# Patient Record
Sex: FEMALE | Race: WHITE | NOT HISPANIC OR LATINO | Employment: FULL TIME | URBAN - METROPOLITAN AREA
[De-identification: names, ages, dates, MRNs, and addresses within clinical notes are randomized per-mention and may not be internally consistent; named-entity substitution may affect disease eponyms.]

---

## 2017-06-13 ENCOUNTER — GENERIC CONVERSION - ENCOUNTER (OUTPATIENT)
Dept: CARDIOLOGY CLINIC | Facility: CLINIC | Age: 52
End: 2017-06-13

## 2018-03-05 RX ORDER — LEVOTHYROXINE SODIUM 50 MCG
50 TABLET ORAL
Refills: 2 | COMMUNITY
Start: 2018-02-06 | End: 2020-09-04

## 2018-03-05 RX ORDER — BUPROPION HYDROCHLORIDE 100 MG/1
TABLET, EXTENDED RELEASE ORAL
Refills: 0 | COMMUNITY
Start: 2018-02-06

## 2018-03-05 RX ORDER — LEVOTHYROXINE SODIUM 100 MCG
TABLET ORAL
Refills: 2 | COMMUNITY
Start: 2018-01-05 | End: 2018-03-12 | Stop reason: ALTCHOICE

## 2018-03-08 RX ORDER — ALPRAZOLAM 0.25 MG/1
TABLET ORAL AS NEEDED
COMMUNITY
End: 2020-09-04 | Stop reason: ALTCHOICE

## 2018-03-08 RX ORDER — MULTIVIT-MIN/IRON/FOLIC ACID/K 18-600-40
1 CAPSULE ORAL
COMMUNITY
End: 2020-11-17

## 2018-03-08 RX ORDER — MULTIVITAMIN/IRON/FOLIC ACID 18MG-0.4MG
1 TABLET ORAL DAILY
COMMUNITY
End: 2018-03-12 | Stop reason: ALTCHOICE

## 2018-03-12 ENCOUNTER — OFFICE VISIT (OUTPATIENT)
Dept: CARDIOLOGY CLINIC | Facility: CLINIC | Age: 53
End: 2018-03-12
Payer: COMMERCIAL

## 2018-03-12 VITALS
BODY MASS INDEX: 20.86 KG/M2 | WEIGHT: 125.2 LBS | HEART RATE: 56 BPM | DIASTOLIC BLOOD PRESSURE: 80 MMHG | HEIGHT: 65 IN | SYSTOLIC BLOOD PRESSURE: 110 MMHG

## 2018-03-12 DIAGNOSIS — R00.2 PALPITATIONS: Primary | ICD-10-CM

## 2018-03-12 DIAGNOSIS — E03.9 HYPOTHYROIDISM, UNSPECIFIED TYPE: ICD-10-CM

## 2018-03-12 PROCEDURE — 99243 OFF/OP CNSLTJ NEW/EST LOW 30: CPT | Performed by: INTERNAL MEDICINE

## 2018-03-12 PROCEDURE — 93000 ELECTROCARDIOGRAM COMPLETE: CPT | Performed by: INTERNAL MEDICINE

## 2018-03-12 NOTE — PROGRESS NOTES
Cardiology Consultation     Betty Jensen  [id number]  1965  [facility] [address] [facility] [address]    1  Palpitations  POCT ECG   2  Hypothyroidism, unspecified type  Ambulatory referral to Endocrinology       History of present illness    The patient is a pleasant 61-year-old lady who is here for palpitations  Episodes started in August September of 2017  She was having episodes almost every day at that time  She would feel her heart fluttering  This can last anywhere from a few minutes to a event few hours    It was not associated with angina like chest pain or chest pressure  It was not associated with orthopnea, paroxysmal nocturnal dyspnea  There was no leg swelling  There was no presyncope or syncope    Usually the episodes would settle over time  They have decreased in frequency over the subsequent months  She has not felt any palpitation in the last 2 months      She is otherwise extremely active  She exercises 6/7 days, on treadmill, Pilates as well as weight training  She is also extremely conscious about her diet  She does not take any stimulants  There is no excessive coffee, caffeine or dark chocolate    Does not have any significant history of coronary artery disease  There is no family history of sudden cardiac death        Patient Active Problem List   Diagnosis    Palpitations     Past Medical History:   Diagnosis Date    Heart palpitations      Social History     Social History    Marital status: /Civil Union     Spouse name: N/A    Number of children: N/A    Years of education: N/A     Occupational History    Not on file       Social History Main Topics    Smoking status: Never Smoker    Smokeless tobacco: Never Used    Alcohol use 0 6 - 1 2 oz/week     1 - 2 Glasses of wine per week      Comment: daily    Drug use: Unknown    Sexual activity: Not on file Other Topics Concern    Not on file     Social History Narrative    No narrative on file      No family history on file  No past surgical history on file  Current Outpatient Prescriptions:     ALPRAZolam (XANAX) 0 25 mg tablet, Take by mouth daily at bedtime as needed for anxiety, Disp: , Rfl:     Ascorbic Acid (VITAMIN C) 500 MG CAPS, Take by mouth, Disp: , Rfl:     B Complex Vitamins (VITAMIN B COMPLEX PO), Take by mouth, Disp: , Rfl:     buPROPion (WELLBUTRIN SR) 100 mg 12 hr tablet, TAKE 1 TABLET BY [facility] TWICE DAILY, Disp: , Rfl: 0    Calcium Carbonate (CALTRATE 600 PO), Take by mouth, Disp: , Rfl:     Coenzyme Q10 (CO Q 10) 100 MG CAPS, Take 1 capsule by mouth daily, Disp: , Rfl:     SYNTHROID 50 MCG tablet, Take 50 mcg by mouth daily, Disp: , Rfl: 2  No Known Allergies  Vitals:    03/12/18 1632   BP: 110/80   BP Location: Left arm   Patient Position: Sitting   Cuff Size: Standard   Pulse: 56   Weight: 56 8 kg (125 lb 3 2 oz)   Height: 5' 5" (1 651 m)       Labs:No results found for: NA, K, CL, CO2, BUN, CREATININE, GLUCOSE, CALCIUM  No results found for: CKTOTAL, CKMB, CKMBINDEX, TROPONINI  No results found for: WBC, HGB, HCT, MCV, PLT  No results found for: CHOL, TRIG, HDL, LDLDIRECT  Imaging: No results found  Review of Systems:  Review of Systems   Reason unable to perform ROS: As described in my history of present illness  All other systems reviewed and are negative  Physical Exam:  Physical Exam   Constitutional: She is oriented to person, place, and time  She appears well-developed and well-nourished  No distress  Not in any distress at the current time   HENT:   Head: Normocephalic and atraumatic  Right Ear: External ear normal    Left Ear: External ear normal    Nose: Nose normal    Mouth/Throat: Uvula is midline and mucous membranes are normal    Eyes: Conjunctivae, EOM and lids are normal  Pupils are equal, round, and reactive to light  No scleral icterus     No pallor  No cyanosis  No icterus   Neck: Trachea normal and normal range of motion  Neck supple  No JVD present  Carotid bruit is not present  No thyromegaly present  No jugular lymphadenopathy   Cardiovascular: Normal rate, regular rhythm, S1 normal, S2 normal, normal heart sounds, intact distal pulses and normal pulses  PMI is not displaced  Exam reveals no gallop, no S3, no S4 and no friction rub  No murmur heard  Pulmonary/Chest: Effort normal and breath sounds normal  No accessory muscle usage  No respiratory distress  She has no decreased breath sounds  She has no wheezes  She has no rhonchi  She has no rales  She exhibits no tenderness  Abdominal: Soft  Normal appearance and bowel sounds are normal  She exhibits no distension and no mass  There is no splenomegaly or hepatomegaly  There is no tenderness  Musculoskeletal: Normal range of motion  She exhibits no edema, tenderness or deformity  Lymphadenopathy:     She has no cervical adenopathy  Neurological: She is alert and oriented to person, place, and time  Facial symmetry is retained  Extraocular movements are retained  Head neck tongue and palate movement are retained and symmetric   Skin: Skin is intact  No abrasion, no lesion and no rash noted  No erythema  Nails show no clubbing  Psychiatric: She has a normal mood and affect  Her speech is normal and behavior is normal  Thought content normal        Discussion/Summary:      1    Palpitations    Patient is having them for about 4-5 years now  They are episodic - Last episode happened in fall of 2017, after they came back from a vacation in [facility]  There has been no further episodes of significance in the last few months      Patient does have a history of thyroid disease  She is on replacement Synthroid  Her heart rate is still bradycardic      There is no evidence of excessive thyroid replacement at the current time  Patient's episodes are far apart where noninvasive monitoring will be useless  With  minimal symptoms would not consider invasive monitoring      At the current time my recommendation for this patient:  -if the patient has symptoms, to come in for an EKG        2    Thyroid disease  Patient is still bradycardic  She is on replacement Synthroid  Follow-up with her primary care and endocrinologist

## 2018-03-12 NOTE — LETTER
March 12, 2018     Fina Martinez MD  [facility] [address]    Patient: Anil García   YOB: 1965   Date of Visit: 3/12/2018       Dear Dr Doreen Ulloa: Thank you for referring Anil García to me for evaluation  Below are my notes for this consultation  If you have questions, please do not hesitate to call me  I look forward to following your patient along with you  Sincerely,        Mj Hebert MD        CC: MD Mj Warner MD  3/12/2018  5:16 PM  Sign at close encounter                                             Cardiology Consultation     Anil García  [id number]  1965  [facility] [address]    1  Palpitations  POCT ECG   2   Hypothyroidism, unspecified type  Ambulatory referral to Endocrinology       History of present illness    The patient is a pleasant 60-year-old lady who is here for palpitations  Episodes started in August September of 2017  She was having episodes almost every day at that time  She would feel her heart fluttering  This can last anywhere from a few minutes to a event few hours    It was not associated with angina like chest pain or chest pressure  It was not associated with orthopnea, paroxysmal nocturnal dyspnea  There was no leg swelling  There was no presyncope or syncope    Usually the episodes would settle over time  They have decreased in frequency over the subsequent months  She has not felt any palpitation in the last 2 months      She is otherwise extremely active  She exercises 6/7 days, on treadmill, Pilates as well as weight training  She is also extremely conscious about her diet  She does not take any stimulants  There is no excessive coffee, caffeine or dark chocolate    Does not have any significant history of coronary artery disease  There is no family history of sudden cardiac death        Patient Active Problem List   Diagnosis    Palpitations     Past Medical History:   Diagnosis Date    Heart palpitations      Social History     Social History    Marital status: /Civil Union     Spouse name: N/A    Number of children: N/A    Years of education: N/A     Occupational History    Not on file  Social History Main Topics    Smoking status: Never Smoker    Smokeless tobacco: Never Used    Alcohol use 0 6 - 1 2 oz/week     1 - 2 Glasses of wine per week      Comment: daily    Drug use: Unknown    Sexual activity: Not on file     Other Topics Concern    Not on file     Social History Narrative    No narrative on file      No family history on file  No past surgical history on file  Current Outpatient Prescriptions:     ALPRAZolam (XANAX) 0 25 mg tablet, Take by mouth daily at bedtime as needed for anxiety, Disp: , Rfl:     Ascorbic Acid (VITAMIN C) 500 MG CAPS, Take by mouth, Disp: , Rfl:     B Complex Vitamins (VITAMIN B COMPLEX PO), Take by mouth, Disp: , Rfl:     buPROPion (WELLBUTRIN SR) 100 mg 12 hr tablet, TAKE 1 TABLET BY [facility] TWICE DAILY, Disp: , Rfl: 0    Calcium Carbonate (CALTRATE 600 PO), Take by mouth, Disp: , Rfl:     Coenzyme Q10 (CO Q 10) 100 MG CAPS, Take 1 capsule by mouth daily, Disp: , Rfl:     SYNTHROID 50 MCG tablet, Take 50 mcg by mouth daily, Disp: , Rfl: 2  No Known Allergies  Vitals:    03/12/18 1632   BP: 110/80   BP Location: Left arm   Patient Position: Sitting   Cuff Size: Standard   Pulse: 56   Weight: 56 8 kg (125 lb 3 2 oz)   Height: 5' 5" (1 651 m)       Labs:No results found for: NA, K, CL, CO2, BUN, CREATININE, GLUCOSE, CALCIUM  No results found for: CKTOTAL, CKMB, CKMBINDEX, TROPONINI  No results found for: WBC, HGB, HCT, MCV, PLT  No results found for: CHOL, TRIG, HDL, LDLDIRECT  Imaging: No results found      Review of Systems:  Review of Systems   Reason unable to perform ROS: As described in my history of present illness  All other systems reviewed and are negative  Physical Exam:  Physical Exam   Constitutional: She is oriented to person, place, and time  She appears well-developed and well-nourished  No distress  Not in any distress at the current time   HENT:   Head: Normocephalic and atraumatic  Right Ear: External ear normal    Left Ear: External ear normal    Nose: Nose normal    Mouth/Throat: Uvula is midline and mucous membranes are normal    Eyes: Conjunctivae, EOM and lids are normal  Pupils are equal, round, and reactive to light  No scleral icterus  No pallor  No cyanosis  No icterus   Neck: Trachea normal and normal range of motion  Neck supple  No JVD present  Carotid bruit is not present  No thyromegaly present  No jugular lymphadenopathy   Cardiovascular: Normal rate, regular rhythm, S1 normal, S2 normal, normal heart sounds, intact distal pulses and normal pulses  PMI is not displaced  Exam reveals no gallop, no S3, no S4 and no friction rub  No murmur heard  Pulmonary/Chest: Effort normal and breath sounds normal  No accessory muscle usage  No respiratory distress  She has no decreased breath sounds  She has no wheezes  She has no rhonchi  She has no rales  She exhibits no tenderness  Abdominal: Soft  Normal appearance and bowel sounds are normal  She exhibits no distension and no mass  There is no splenomegaly or hepatomegaly  There is no tenderness  Musculoskeletal: Normal range of motion  She exhibits no edema, tenderness or deformity  Lymphadenopathy:     She has no cervical adenopathy  Neurological: She is alert and oriented to person, place, and time  Facial symmetry is retained  Extraocular movements are retained  Head neck tongue and palate movement are retained and symmetric   Skin: Skin is intact  No abrasion, no lesion and no rash noted  No erythema  Nails show no clubbing  Psychiatric: She has a normal mood and affect   Her speech is normal and behavior is normal  Thought content normal        Discussion/Summary:      1  Palpitations    Patient is having them for about 4-5 years now  They are episodic - Last episode happened in fall of 2017, after they came back from a vacation in [place]  There has been no further episodes of significance in the last few months      Patient does have a history of thyroid disease  She is on replacement Synthroid  Her heart rate is still bradycardic      There is no evidence of excessive thyroid replacement at the current time  Patient's episodes are far apart where noninvasive monitoring will be useless  With  minimal symptoms would not consider invasive monitoring      At the current time my recommendation for this patient:  -if the patient has symptoms, to come in for an EKG        2    Thyroid disease  Patient is still bradycardic  She is on replacement Synthroid  Follow-up with her primary care and endocrinologist

## 2018-05-29 ENCOUNTER — TELEPHONE (OUTPATIENT)
Dept: ENDOCRINOLOGY | Facility: CLINIC | Age: 53
End: 2018-05-29

## 2018-05-30 ENCOUNTER — OFFICE VISIT (OUTPATIENT)
Dept: ENDOCRINOLOGY | Facility: CLINIC | Age: 53
End: 2018-05-30
Payer: COMMERCIAL

## 2018-05-30 VITALS
HEIGHT: 65 IN | SYSTOLIC BLOOD PRESSURE: 110 MMHG | HEART RATE: 72 BPM | BODY MASS INDEX: 20.68 KG/M2 | WEIGHT: 124.1 LBS | DIASTOLIC BLOOD PRESSURE: 60 MMHG

## 2018-05-30 DIAGNOSIS — R00.2 PALPITATIONS: ICD-10-CM

## 2018-05-30 DIAGNOSIS — E03.9 HYPOTHYROIDISM, UNSPECIFIED TYPE: Primary | ICD-10-CM

## 2018-05-30 PROCEDURE — 99243 OFF/OP CNSLTJ NEW/EST LOW 30: CPT | Performed by: INTERNAL MEDICINE

## 2018-05-30 RX ORDER — LIOTHYRONINE SODIUM 25 UG/1
25 TABLET ORAL
COMMUNITY
End: 2020-09-04

## 2018-05-30 NOTE — PROGRESS NOTES
Darryle Imperial 47 y o  female MRN: [id number]    Encounter: [id number]      Assessment/Plan     Assessment: This is a 47y o -year-old female with hypothyroidism and palpitation  Plan:  1  Hypothyroidism-her thyroid function testing is normal  She should continue on the current regimen  I do not believe this is the cause of her palpitation  2   For the palpitation, I have asked her to call her primary care physician when she is having episode so we can do an EKG at that time  This may elucidate a possible explanation  CC:   Palpitation    History of Present Illness     HPI:  56-year-old woman with hypothyroidism for six years presents for consultation  She states that she has been having palpitations every few months at last about a day or two  Nothing seems to make them better or worse  They are not associated with any particular activity  For the hypothyroidism, she is on Cytomel as well as levothyroxine  She denies any symptoms of hypo or hyperthyroidism except for occasional palpitations  Review of Systems   Constitutional: Negative for chills and fever  Respiratory: Negative for shortness of breath  Cardiovascular: Negative for chest pain  Gastrointestinal: Negative for constipation, diarrhea, nausea and vomiting  Endocrine: Negative for cold intolerance and heat intolerance  All other systems reviewed and are negative        Historical Information   Past Medical History:   Diagnosis Date    Heart palpitations      Past Surgical History:   Procedure Laterality Date    APPENDECTOMY  1     Begoniasingel 2000    HYSTERECTOMY       Social History   History   Alcohol Use    0 6 - 1 2 oz/week    1 - 2 Glasses of wine per week     Comment: daily     History   Drug Use No     History   Smoking Status    Never Smoker   Smokeless Tobacco    Never Used     Family History:   Family History   Problem Relation Age of Onset    Hypertension Father    [employer] Prostate cancer Brother     Stroke Maternal Grandfather        Meds/Allergies   Current Outpatient Prescriptions   Medication Sig Dispense Refill    ALPRAZolam (XANAX) 0 25 mg tablet Take by mouth daily at bedtime as needed for anxiety      Ascorbic Acid (VITAMIN C) 500 MG CAPS Take by mouth      B Complex Vitamins (VITAMIN B COMPLEX PO) Take by mouth      buPROPion (WELLBUTRIN SR) 100 mg 12 hr tablet TAKE 1 TABLET BY MOIUTH TWICE DAILY  0    Calcium Carbonate (CALTRATE 600 PO) Take by mouth      Coenzyme Q10 (CO Q 10) 100 MG CAPS Take 1 capsule by mouth daily      liothyronine (CYTOMEL) 25 mcg tablet Take 25 mcg by mouth daily Taking half a tablet once daily      SYNTHROID 50 MCG tablet Take 50 mcg by mouth daily  2     No current facility-administered medications for this visit  No Known Allergies    Objective   Vitals: Blood pressure 110/60, pulse 72, height 5' 5" (1 651 m), weight 56 3 kg (124 lb 1 6 oz)  Physical Exam   Constitutional: She is oriented to person, place, and time  She appears well-developed and well-nourished  No distress  HENT:   Head: Normocephalic and atraumatic  Mouth/Throat: Oropharynx is clear and moist and mucous membranes are normal  No oropharyngeal exudate  Eyes: Conjunctivae, EOM and lids are normal  Right eye exhibits no discharge  Left eye exhibits no discharge  No scleral icterus  Neck: Neck supple  No thyromegaly present  Cardiovascular: Normal rate, regular rhythm and normal heart sounds  Exam reveals no gallop and no friction rub  No murmur heard  Pulmonary/Chest: Effort normal and breath sounds normal  No respiratory distress  She has no wheezes  Abdominal: Soft  Bowel sounds are normal  She exhibits no distension  There is no tenderness  Musculoskeletal: Normal range of motion  She exhibits no edema, tenderness or deformity  Lymphadenopathy:        Head (right side): No occipital adenopathy present          Head (left side): No occipital adenopathy present  Right: No supraclavicular adenopathy present  Left: No supraclavicular adenopathy present  Neurological: She is alert and oriented to person, place, and time  No cranial nerve deficit  Skin: Skin is warm and intact  No rash noted  She is not diaphoretic  No erythema  Psychiatric: She has a normal mood and affect  Her behavior is normal    Vitals reviewed  The history was obtained from the review of the chart, patient  Lab Results:        Imaging Studies:       I have personally reviewed pertinent reports  Portions of the record may have been created with voice recognition software  Occasional wrong word or "sound a like" substitutions may have occurred due to the inherent limitations of voice recognition software  Read the chart carefully and recognize, using context, where substitutions have occurred

## 2018-05-30 NOTE — LETTER
May 30, 2018     [address] [place] [address]    Patient: Gerald Rincon   YOB: 1965   Date of Visit: 5/30/2018       Dear Dr Jordan Burk: Thank you for referring Gerald Rincon to me for evaluation  Below are my notes for this consultation  If you have questions, please do not hesitate to call me  I look forward to following your patient along with you  Sincerely,        Eduardo Randle MD        CC: MD Eduardo Islas MD  5/30/2018  1:53 PM  Sign at close encounter   Gerald Rincon 47 y o  female MRN: [id number]    Encounter: [id number]      Assessment/Plan     Assessment: This is a 47y o -year-old female with hypothyroidism and palpitation  Plan:  1  Hypothyroidism-her thyroid function testing is normal  She should continue on the current regimen  I do not believe this is the cause of her palpitation  2   For the palpitation, I have asked her to call her primary care physician when she is having episode so we can do an EKG at that time  This may elucidate a possible explanation  CC:   Palpitation    History of Present Illness     HPI:  28-year-old woman with hypothyroidism for six years presents for consultation  She states that she has been having palpitations every few months at last about a day or two  Nothing seems to make them better or worse  They are not associated with any particular activity  For the hypothyroidism, she is on Cytomel as well as levothyroxine  She denies any symptoms of hypo or hyperthyroidism except for occasional palpitations  Review of Systems   Constitutional: Negative for chills and fever  Respiratory: Negative for shortness of breath  Cardiovascular: Negative for chest pain  Gastrointestinal: Negative for constipation, diarrhea, nausea and vomiting  Endocrine: Negative for cold intolerance and heat intolerance     All other systems reviewed and are negative  Historical Information   Past Medical History:   Diagnosis Date    Heart palpitations      Past Surgical History:   Procedure Laterality Date    APPENDECTOMY  1   [address], [address]    HYSTERECTOMY  2006     Social History   History   Alcohol Use    0 6 - 1 2 oz/week    1 - 2 Glasses of wine per week     Comment: daily     History   Drug Use No     History   Smoking Status    Never Smoker   Smokeless Tobacco    Never Used     Family History:   Family History   Problem Relation Age of Onset    Hypertension Father     Prostate cancer Brother     Stroke Maternal Grandfather        Meds/Allergies   Current Outpatient Prescriptions   Medication Sig Dispense Refill    ALPRAZolam (XANAX) 0 25 mg tablet Take by mouth daily at bedtime as needed for anxiety      Ascorbic Acid (VITAMIN C) 500 MG CAPS Take by mouth      B Complex Vitamins (VITAMIN B COMPLEX PO) Take by mouth      buPROPion (WELLBUTRIN SR) 100 mg 12 hr tablet TAKE 1 TABLET BY [facility] TWICE DAILY  0    Calcium Carbonate (CALTRATE 600 PO) Take by mouth      Coenzyme Q10 (CO Q 10) 100 MG CAPS Take 1 capsule by mouth daily      liothyronine (CYTOMEL) 25 mcg tablet Take 25 mcg by mouth daily Taking half a tablet once daily      SYNTHROID 50 MCG tablet Take 50 mcg by mouth daily  2     No current facility-administered medications for this visit  No Known Allergies    Objective   Vitals: Blood pressure 110/60, pulse 72, height 5' 5" (1 651 m), weight 56 3 kg (124 lb 1 6 oz)  Physical Exam   Constitutional: She is oriented to person, place, and time  She appears well-developed and well-nourished  No distress  HENT:   Head: Normocephalic and atraumatic  Mouth/Throat: Oropharynx is clear and moist and mucous membranes are normal  No oropharyngeal exudate  Eyes: Conjunctivae, EOM and lids are normal  Right eye exhibits no discharge  Left eye exhibits no discharge  No scleral icterus     Neck: Neck supple  No thyromegaly present  Cardiovascular: Normal rate, regular rhythm and normal heart sounds  Exam reveals no gallop and no friction rub  No murmur heard  Pulmonary/Chest: Effort normal and breath sounds normal  No respiratory distress  She has no wheezes  Abdominal: Soft  Bowel sounds are normal  She exhibits no distension  There is no tenderness  Musculoskeletal: Normal range of motion  She exhibits no edema, tenderness or deformity  Lymphadenopathy:        Head (right side): No occipital adenopathy present  Head (left side): No occipital adenopathy present  Right: No supraclavicular adenopathy present  Left: No supraclavicular adenopathy present  Neurological: She is alert and oriented to person, place, and time  No cranial nerve deficit  Skin: Skin is warm and intact  No rash noted  She is not diaphoretic  No erythema  Psychiatric: She has a normal mood and affect  Her behavior is normal    Vitals reviewed  The history was obtained from the review of the chart, patient  Lab Results:        Imaging Studies:       I have personally reviewed pertinent reports  Portions of the record may have been created with voice recognition software  Occasional wrong word or "sound a like" substitutions may have occurred due to the inherent limitations of voice recognition software  Read the chart carefully and recognize, using context, where substitutions have occurred

## 2018-09-17 ENCOUNTER — OFFICE VISIT (OUTPATIENT)
Dept: PODIATRY | Facility: CLINIC | Age: 53
End: 2018-09-17
Payer: COMMERCIAL

## 2018-09-17 VITALS
DIASTOLIC BLOOD PRESSURE: 82 MMHG | HEIGHT: 65 IN | WEIGHT: 124.1 LBS | RESPIRATION RATE: 16 BRPM | BODY MASS INDEX: 20.68 KG/M2 | SYSTOLIC BLOOD PRESSURE: 115 MMHG | HEART RATE: 64 BPM

## 2018-09-17 DIAGNOSIS — M77.41 METATARSALGIA OF BOTH FEET: ICD-10-CM

## 2018-09-17 DIAGNOSIS — Q66.52 CONGENITAL PES PLANUS OF LEFT FOOT: ICD-10-CM

## 2018-09-17 DIAGNOSIS — D36.13 NEUROMA OF FOOT: ICD-10-CM

## 2018-09-17 DIAGNOSIS — Q66.51 CONGENITAL PES PLANUS OF RIGHT FOOT: ICD-10-CM

## 2018-09-17 DIAGNOSIS — M21.969 ACQUIRED DEFORMITY OF FOOT, UNSPECIFIED LATERALITY: Primary | ICD-10-CM

## 2018-09-17 DIAGNOSIS — M77.42 METATARSALGIA OF BOTH FEET: ICD-10-CM

## 2018-09-17 PROCEDURE — L3000 FT INSERT UCB BERKELEY SHELL: HCPCS | Performed by: PODIATRIST

## 2018-09-17 PROCEDURE — 99203 OFFICE O/P NEW LOW 30 MIN: CPT | Performed by: PODIATRIST

## 2018-09-17 PROCEDURE — 73620 X-RAY EXAM OF FOOT: CPT | Performed by: PODIATRIST

## 2018-09-17 RX ORDER — ETODOLAC 400 MG/1
400 TABLET, FILM COATED ORAL 2 TIMES DAILY
Qty: 60 TABLET | Refills: 0 | Status: SHIPPED | OUTPATIENT
Start: 2018-09-17 | End: 2019-02-06

## 2018-09-17 NOTE — PROGRESS NOTES
Assessment/Plan:   Metatarsalgia  Neuroma  Pes planus  Pain  Plan  X-rays performed  Patient was started stretching program   Her feet have been casted for custom molded foot orthotics  She will be placed on Lodine  Blood work is being ordered to rule out arthropathy     There are no diagnoses linked to this encounter  Subjective:  Patient is run with pain in the ball of her feet  She gets numbness into her toes  No history of trauma  Patient ID: Rebecca Shukla is a 47 y o  female      Past Medical History:   Diagnosis Date    Heart palpitations        Past Surgical History:   Procedure Laterality Date    APPENDECTOMY  1   [address]  [facility], 2000    HYSTERECTOMY  2006       No Known Allergies      Current Outpatient Prescriptions:     ALPRAZolam (XANAX) 0 25 mg tablet, Take by mouth daily at bedtime as needed for anxiety, Disp: , Rfl:     Ascorbic Acid (VITAMIN C) 500 MG CAPS, Take by mouth, Disp: , Rfl:     B Complex Vitamins (VITAMIN B COMPLEX PO), Take by mouth, Disp: , Rfl:     buPROPion (WELLBUTRIN SR) 100 mg 12 hr tablet, TAKE 1 TABLET BY MOIUTH TWICE DAILY, Disp: , Rfl: 0    Calcium Carbonate (CALTRATE 600 PO), Take by mouth, Disp: , Rfl:     Coenzyme Q10 (CO Q 10) 100 MG CAPS, Take 1 capsule by mouth daily, Disp: , Rfl:     liothyronine (CYTOMEL) 25 mcg tablet, Take 25 mcg by mouth daily Taking half a tablet once daily, Disp: , Rfl:     SYNTHROID 50 MCG tablet, Take 50 mcg by mouth daily, Disp: , Rfl: 2    Patient Active Problem List   Diagnosis    Palpitations    Hypothyroidism       HPI    The following portions of the patient's history were reviewed and updated as appropriate: allergies, current medications, past family history, past medical history, past social history, past surgical history and problem list     Review of Systems      Historical Information   Past Medical History:   Diagnosis Date    Heart palpitations      Past Surgical History: Procedure Laterality Date    APPENDECTOMY  1   [address]  [facility], [address] HYSTERECTOMY  2006     Social History   History   Alcohol Use    0 6 - 1 2 oz/week    1 - 2 Glasses of wine per week     Comment: daily     History   Drug Use No     Family History:   Family History   Problem Relation Age of Onset    Hypertension Father     Prostate cancer Brother     Stroke Maternal Grandfather        Meds/Allergies   No Known Allergies    Current Outpatient Prescriptions on File Prior to Visit   Medication Sig Dispense Refill    ALPRAZolam (XANAX) 0 25 mg tablet Take by mouth daily at bedtime as needed for anxiety      Ascorbic Acid (VITAMIN C) 500 MG CAPS Take by mouth      B Complex Vitamins (VITAMIN B COMPLEX PO) Take by mouth      buPROPion (WELLBUTRIN SR) 100 mg 12 hr tablet [facility]  0    Calcium Carbonate (CALTRATE 600 PO) Take by mouth      Coenzyme Q10 (CO Q 10) 100 MG CAPS Take 1 capsule by mouth daily      liothyronine (CYTOMEL) 25 mcg tablet Take 25 mcg by mouth daily Taking half a tablet once daily      SYNTHROID 50 MCG tablet Take 50 mcg by mouth daily  2     No current facility-administered medications on file prior to visit  Objective:  Patient's shoes and socks removed     Foot Exam    General  General Appearance: appears stated age and healthy   Orientation: alert and oriented to person, place, and time   Affect: appropriate       Right Foot/Ankle     Inspection and Palpation  Ecchymosis: none  Tenderness: metatarsals   Swelling: metatarsals   Arch: pes planus  Hammertoes: second toe and fifth toe  Claw Toes: absent  Hallux valgus: yes  Hallux limitus: yes  Skin Exam: callus and dry skin;     Neurovascular  Dorsalis pedis: 2+  Posterior tibial: 3+  Superficial peroneal nerve sensation: diminished  Deep peroneal nerve sensation: diminished  Achilles reflex: 2+  Babinski reflex: 2+    Muscle Strength  Ankle dorsiflexion: 5  Ankle plantar flexion: 5  Ankle inversion: 5  Ankle eversion: 5  Great toe extension: 5  Great toe flexion: 5    Range of Motion    Passive  Ankle dorsiflexion: 5        Left Foot/Ankle      Inspection and Palpation  Tenderness: metatarsals   Swelling: metatarsals   Arch: pes planus  Hammertoes: second toe and fifth toe  Claw toes: absent  Hallux valgus: yes  Hallux limitus: yes  Skin Exam: callus and dry skin;     Neurovascular  Dorsalis pedis: 2+  Posterior tibial: 3+  Superficial peroneal nerve sensation: diminished  Deep peroneal nerve sensation: diminished  Achilles reflex: 2+  Babinski reflex: 2+    Muscle Strength  Ankle dorsiflexion: 5  Ankle plantar flexion: 5  Ankle inversion: 5  Ankle eversion: 5  Great toe extension: 5  Great toe flexion: 5    Range of Motion    Passive  Ankle dorsiflexion: 5          Physical Exam   Constitutional: She appears well-developed and well-nourished  Cardiovascular: Normal rate and regular rhythm  Pulses:       Dorsalis pedis pulses are 2+ on the right side, and 2+ on the left side  Posterior tibial pulses are 3+ on the right side, and 3+ on the left side  Feet:   Right Foot:   Skin Integrity: Positive for callus and dry skin  Left Foot:   Skin Integrity: Positive for callus and dry skin  Neurological:   Reflex Scores:       Achilles reflexes are 2+ on the right side and 2+ on the left side    Positive Scarlet sign 3rd interspace bilateral

## 2018-09-26 LAB
ANA SER QL IF: NEGATIVE
B BURGDOR AB SER IA-ACNC: <0.9 INDEX
ERYTHROCYTE [SEDIMENTATION RATE] IN BLOOD BY WESTERGREN METHOD: 6 MM/H
RHEUMATOID FACT SERPL-ACNC: <14 IU/ML
T3FREE SERPL-MCNC: 3 PG/ML (ref 2.3–4.2)
T4 FREE SERPL-MCNC: 0.9 NG/DL (ref 0.8–1.8)
TSH SERPL-ACNC: 1.18 MIU/L

## 2019-01-29 ENCOUNTER — APPOINTMENT (OUTPATIENT)
Dept: LAB | Facility: CLINIC | Age: 54
End: 2019-01-29
Payer: COMMERCIAL

## 2019-01-29 ENCOUNTER — OFFICE VISIT (OUTPATIENT)
Dept: OBGYN CLINIC | Facility: CLINIC | Age: 54
End: 2019-01-29
Payer: COMMERCIAL

## 2019-01-29 ENCOUNTER — APPOINTMENT (OUTPATIENT)
Dept: RADIOLOGY | Facility: CLINIC | Age: 54
End: 2019-01-29
Payer: COMMERCIAL

## 2019-01-29 VITALS
HEIGHT: 65 IN | DIASTOLIC BLOOD PRESSURE: 71 MMHG | BODY MASS INDEX: 20.33 KG/M2 | SYSTOLIC BLOOD PRESSURE: 110 MMHG | HEART RATE: 63 BPM | WEIGHT: 122 LBS

## 2019-01-29 DIAGNOSIS — M79.672 BILATERAL FOOT PAIN: ICD-10-CM

## 2019-01-29 DIAGNOSIS — G57.62 MORTON'S NEUROMA OF THIRD INTERSPACE OF LEFT FOOT: ICD-10-CM

## 2019-01-29 DIAGNOSIS — Q66.52 CONGENITAL PES PLANUS OF LEFT FOOT: ICD-10-CM

## 2019-01-29 DIAGNOSIS — M79.671 BILATERAL FOOT PAIN: ICD-10-CM

## 2019-01-29 DIAGNOSIS — M20.11 HALLUX VALGUS, RIGHT: ICD-10-CM

## 2019-01-29 DIAGNOSIS — Q66.51 CONGENITAL PES PLANUS OF RIGHT FOOT: ICD-10-CM

## 2019-01-29 DIAGNOSIS — G57.62 MORTON'S NEUROMA OF THIRD INTERSPACE OF LEFT FOOT: Primary | ICD-10-CM

## 2019-01-29 LAB
ANION GAP SERPL CALCULATED.3IONS-SCNC: 10 MMOL/L (ref 4–13)
BASOPHILS # BLD AUTO: 0.02 THOUSANDS/ΜL (ref 0–0.1)
BASOPHILS NFR BLD AUTO: 1 % (ref 0–1)
BUN SERPL-MCNC: 16 MG/DL (ref 5–25)
CALCIUM SERPL-MCNC: 9.1 MG/DL (ref 8.3–10.1)
CHLORIDE SERPL-SCNC: 103 MMOL/L (ref 100–108)
CO2 SERPL-SCNC: 29 MMOL/L (ref 21–32)
CREAT SERPL-MCNC: 0.69 MG/DL (ref 0.6–1.3)
EOSINOPHIL # BLD AUTO: 0.09 THOUSAND/ΜL (ref 0–0.61)
EOSINOPHIL NFR BLD AUTO: 2 % (ref 0–6)
ERYTHROCYTE [DISTWIDTH] IN BLOOD BY AUTOMATED COUNT: 11.6 % (ref 11.6–15.1)
GFR SERPL CREATININE-BSD FRML MDRD: 100 ML/MIN/1.73SQ M
GLUCOSE SERPL-MCNC: 81 MG/DL (ref 65–140)
HCT VFR BLD AUTO: 41.5 % (ref 34.8–46.1)
HGB BLD-MCNC: 13.6 G/DL (ref 11.5–15.4)
IMM GRANULOCYTES # BLD AUTO: 0.01 THOUSAND/UL (ref 0–0.2)
IMM GRANULOCYTES NFR BLD AUTO: 0 % (ref 0–2)
LYMPHOCYTES # BLD AUTO: 1.52 THOUSANDS/ΜL (ref 0.6–4.47)
LYMPHOCYTES NFR BLD AUTO: 36 % (ref 14–44)
MCH RBC QN AUTO: 30.2 PG (ref 26.8–34.3)
MCHC RBC AUTO-ENTMCNC: 32.8 G/DL (ref 31.4–37.4)
MCV RBC AUTO: 92 FL (ref 82–98)
MONOCYTES # BLD AUTO: 0.39 THOUSAND/ΜL (ref 0.17–1.22)
MONOCYTES NFR BLD AUTO: 9 % (ref 4–12)
NEUTROPHILS # BLD AUTO: 2.17 THOUSANDS/ΜL (ref 1.85–7.62)
NEUTS SEG NFR BLD AUTO: 52 % (ref 43–75)
NRBC BLD AUTO-RTO: 0 /100 WBCS
PLATELET # BLD AUTO: 200 THOUSANDS/UL (ref 149–390)
PMV BLD AUTO: 10 FL (ref 8.9–12.7)
POTASSIUM SERPL-SCNC: 3.6 MMOL/L (ref 3.5–5.3)
RBC # BLD AUTO: 4.51 MILLION/UL (ref 3.81–5.12)
SODIUM SERPL-SCNC: 142 MMOL/L (ref 136–145)
WBC # BLD AUTO: 4.2 THOUSAND/UL (ref 4.31–10.16)

## 2019-01-29 PROCEDURE — 80048 BASIC METABOLIC PNL TOTAL CA: CPT

## 2019-01-29 PROCEDURE — 99203 OFFICE O/P NEW LOW 30 MIN: CPT | Performed by: ORTHOPAEDIC SURGERY

## 2019-01-29 PROCEDURE — 36415 COLL VENOUS BLD VENIPUNCTURE: CPT

## 2019-01-29 PROCEDURE — 85025 COMPLETE CBC W/AUTO DIFF WBC: CPT

## 2019-01-29 PROCEDURE — 93005 ELECTROCARDIOGRAM TRACING: CPT

## 2019-01-29 PROCEDURE — 73630 X-RAY EXAM OF FOOT: CPT

## 2019-01-29 RX ORDER — CHLORHEXIDINE GLUCONATE 4 G/100ML
SOLUTION TOPICAL DAILY PRN
Status: CANCELLED | OUTPATIENT
Start: 2019-01-29

## 2019-01-29 NOTE — H&P (VIEW-ONLY)
MADONNA Eisenberg  Attending, Orthopaedic Surgery  [facility] [address] [facility]      ORTHOPAEDIC FOOT AND ANKLE CLINIC VISIT     Assessment:     Encounter Diagnoses   Name Primary? 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 Yes    Congenital pes planus of left foot     Congenital pes planus of right foot     Hallux valgus, right             Plan:   · The patient verbalized understanding of exam findings and treatment plan  We engaged in the shared decision-making process and treatment options were discussed at length with the patient  Surgical and conservative management discussed today along with risks and benefits  · Patient has a 3rd webspace Antunez's neuroma on the left foot - risks and benefits of surgical excision were explained in detail and patient elected to proceed with surgery within the next 2 weeks  · Bunion on the right foot - patient would like surgery eventually but would like to wait until she has more time off work in the summer  · WBAT BLE   · Patient advised to wear comfortable shoes/sneakers to reduce irritation of the neuroma and bunion  She is here today in 3 inch heels  See back 3 weeks after surgery    CONSENT FOR SOFT TISSUE PROCEDURES:   Patient understands that there is no guarantee that the surgery will relieve all of their pain and also understands that there may be a prolonged course of protected weight-bearing status required which will restrict them from driving and other activities as discussed at today's visit  Patient recognizes that there are risks with surgery including bleeding, numbness, nerve irritation, wound complications, infection, continued pain, anesthetic complications, death, failure of procedure and possible need for further surgery  The patient understands that there is no guarantee that this surgery will relieve all of Her pain and symptoms    Patient understands that there is no guarantee that they will return to full function after the procedure  Patient has provided informed consent for the procedure  History of Present Illness:   Chief Complaint:   Chief Complaint   Patient presents with    Right [address] is a 47 y o  female who is being seen for bilateral foot pain secondary to Antunez's neuroma on the left foot and a bunion on the right  On the left her pain is localized in the 3rd webspace and described as a burning sharp pain  On the right her pain is localized medially over the bunion which is a throbbing sharp pain  Patient denies numbness, tingling or radicular pain  Denies history of neuropathy  Patient does not smoke, does not have diabetes and does not take blood thinners  Patient denies family history of anesthesia complications and has not had any complications with anesthesia  She currently works as a  and is on her feet all the time  Previously she was seen by podiatry and given orthotics and injections for the pain, however these interventions did not provide long lasting relief  Pain/symptom timing:  Worse during the day when active  Pain/symptom context:  Worse with activites and work  Pain/symptom modifying factors:  Rest makes better, activities make worse  Pain/symptom associated signs/symptoms: none    Prior treatment   · NSAIDsYes   · Injections Yes   · Bracing/Orthotics Yes    · Physical Therapy No     Orthopedic Surgical History:   None     Past Medical, Surgical and Social History:  Past Medical History:  has a past medical history of Heart palpitations  Problem List:  does not have any pertinent problems on file  Past Surgical History:  has a past surgical history that includes  section (, , ); Appendectomy (); and Hysterectomy ()  Family History: family history includes Hypertension in her father; Prostate cancer in her brother; Stroke in her maternal grandfather    Social History:  reports that she has never smoked  She has never used smokeless tobacco  She reports that she drinks about 0 6 - 1 2 oz of alcohol per week   She reports that she does not use drugs  Current Medications: has a current medication list which includes the following prescription(s): alprazolam, vitamin c, b complex vitamins, bupropion, calcium carbonate, co q 10, etodolac, liothyronine, and synthroid  Allergies: has No Known Allergies  Review of Systems:  General- denies fever/chills  HEENT- denies hearing loss or sore throat  Eyes- denies eye pain or visual disturbances, denies red eyes  Respiratory- denies cough or SOB  Cardio- denies chest pain or palpitations  GI- denies abdominal pain  Endocrine- denies urinary frequency  Urinary- denies pain with urination  Musculoskeletal- Negative except noted above  Skin- denies rashes or wounds  Neurological- denies dizziness or headache  Psychiatric- denies anxiety or difficulty concentrating    Physical Exam:   /71   Pulse 63   Ht 5' 4 5" (1 638 m)   Wt 55 3 kg (122 lb)   BMI 20 62 kg/m²   General/Constitutional: No apparent distress: well-nourished and well developed  Eyes: normal ocular motion  Lymphatic: No appreciable lymphadenopathy  Respiratory: Non-labored breathing  Vascular: No edema, swelling or tenderness, except as noted in detailed exam   Integumentary: No impressive skin lesions present, except as noted in detailed exam   Neuro: No ataxia or tremors noted  Psych: Normal mood and affect, oriented to person, place and time  Appropriate affect  Musculoskeletal: Normal, except as noted in detailed exam and in HPI  Examination        Gait Normal   Musculoskeletal Tender to palpation over bunion on medial aspect of 1st MTP joint of right foot        Skin Normal  Mild erythema over bunion of right foot    Nails Normal    Range of Motion  20 degrees dorsiflexion, 30 degrees plantarflexion  Subtalar motion: WNL    Stability Stable    Muscle Strength 5/5 tibialis anterior  5/5 gastrocnemius-soleus  5/5 posterior tibialis  5/5 peroneal/eversion strength  5/5 EHL  5/5 FHL    Neurologic Normal    Sensation Intact to light touch throughout sural, saphenous, superficial peroneal, deep peroneal and medial/lateral plantar nerve distributions  [place]-Louisa 5 07 filament (10g) testing deferred  Cardiovascular Brisk capillary refill < 2 seconds,intact DP and PT pulses    Special Tests + Scarlet's click left foot between 3rd & 4th metatarsals      Imaging Studies:   3 views of the LEFT foot were taken, reviewed and interpreted independently that demonstrate no acute fracture or dislocation, no osseous abnormality  Mild hallux valgus deformity  3 views of the RIGHT foot were taken, reviewed and interpreted independently that demonstrate moderate hallux valgus deformity and associated bunion  No acute fractures or dislocations  Grace Sale Lachman, MD  Foot & Ankle Surgery   Department of [address] [place] [address]      I personally performed the service  Grace Sale Lachman, MD

## 2019-01-29 NOTE — PROGRESS NOTES
MADONNA Springer  Attending, Orthopaedic Surgery  [facility] [address] [facility]      ORTHOPAEDIC FOOT AND ANKLE CLINIC VISIT     Assessment:     Encounter Diagnoses   Name Primary? 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 Yes    Congenital pes planus of left foot     Congenital pes planus of right foot     Hallux valgus, right             Plan:   · The patient verbalized understanding of exam findings and treatment plan  We engaged in the shared decision-making process and treatment options were discussed at length with the patient  Surgical and conservative management discussed today along with risks and benefits  · Patient has a 3rd webspace Antunez's neuroma on the left foot - risks and benefits of surgical excision were explained in detail and patient elected to proceed with surgery within the next 2 weeks  · Bunion on the right foot - patient would like surgery eventually but would like to wait until she has more time off work in the summer  · WBAT BLE   · Patient advised to wear comfortable shoes/sneakers to reduce irritation of the neuroma and bunion  She is here today in 3 inch heels  See back 3 weeks after surgery    CONSENT FOR SOFT TISSUE PROCEDURES:   Patient understands that there is no guarantee that the surgery will relieve all of their pain and also understands that there may be a prolonged course of protected weight-bearing status required which will restrict them from driving and other activities as discussed at today's visit  Patient recognizes that there are risks with surgery including bleeding, numbness, nerve irritation, wound complications, infection, continued pain, anesthetic complications, death, failure of procedure and possible need for further surgery  The patient understands that there is no guarantee that this surgery will relieve all of Her pain and symptoms    Patient understands that there is no guarantee that they will return to full function after the procedure  Patient has provided informed consent for the procedure  History of Present Illness:   Chief Complaint:   Chief Complaint   Patient presents with    Right [address] is a 47 y o  female who is being seen for bilateral foot pain secondary to Antunez's neuroma on the left foot and a bunion on the right  On the left her pain is localized in the 3rd webspace and described as a burning sharp pain  On the right her pain is localized medially over the bunion which is a throbbing sharp pain  Patient denies numbness, tingling or radicular pain  Denies history of neuropathy  Patient does not smoke, does not have diabetes and does not take blood thinners  Patient denies family history of anesthesia complications and has not had any complications with anesthesia  She currently works as a  and is on her feet all the time  Previously she was seen by podiatry and given orthotics and injections for the pain, however these interventions did not provide long lasting relief  Pain/symptom timing:  Worse during the day when active  Pain/symptom context:  Worse with activites and work  Pain/symptom modifying factors:  Rest makes better, activities make worse  Pain/symptom associated signs/symptoms: none    Prior treatment   · NSAIDsYes   · Injections Yes   · Bracing/Orthotics Yes    · Physical Therapy No     Orthopedic Surgical History:   None     Past Medical, Surgical and Social History:  Past Medical History:  has a past medical history of Heart palpitations  Problem List:  does not have any pertinent problems on file  Past Surgical History:  has a past surgical history that includes  section (, , ); Appendectomy (); and Hysterectomy ()  Family History: family history includes Hypertension in her father; Prostate cancer in her brother; Stroke in her maternal grandfather    Social History:  reports that she has never smoked  She has never used smokeless tobacco  She reports that she drinks about 0 6 - 1 2 oz of alcohol per week   She reports that she does not use drugs  Current Medications: has a current medication list which includes the following prescription(s): alprazolam, vitamin c, b complex vitamins, bupropion, calcium carbonate, co q 10, etodolac, liothyronine, and synthroid  Allergies: has No Known Allergies  Review of Systems:  General- denies fever/chills  HEENT- denies hearing loss or sore throat  Eyes- denies eye pain or visual disturbances, denies red eyes  Respiratory- denies cough or SOB  Cardio- denies chest pain or palpitations  GI- denies abdominal pain  Endocrine- denies urinary frequency  Urinary- denies pain with urination  Musculoskeletal- Negative except noted above  Skin- denies rashes or wounds  Neurological- denies dizziness or headache  Psychiatric- denies anxiety or difficulty concentrating    Physical Exam:   /71   Pulse 63   Ht 5' 4 5" (1 638 m)   Wt 55 3 kg (122 lb)   BMI 20 62 kg/m²   General/Constitutional: No apparent distress: well-nourished and well developed  Eyes: normal ocular motion  Lymphatic: No appreciable lymphadenopathy  Respiratory: Non-labored breathing  Vascular: No edema, swelling or tenderness, except as noted in detailed exam   Integumentary: No impressive skin lesions present, except as noted in detailed exam   Neuro: No ataxia or tremors noted  Psych: Normal mood and affect, oriented to person, place and time  Appropriate affect  Musculoskeletal: Normal, except as noted in detailed exam and in HPI  Examination        Gait Normal   Musculoskeletal Tender to palpation over bunion on medial aspect of 1st MTP joint of right foot        Skin Normal  Mild erythema over bunion of right foot    Nails Normal    Range of Motion  20 degrees dorsiflexion, 30 degrees plantarflexion  Subtalar motion: WNL    Stability Stable    Muscle Strength 5/5 tibialis anterior  5/5 gastrocnemius-soleus  5/5 posterior tibialis  5/5 peroneal/eversion strength  5/5 EHL  5/5 FHL    Neurologic Normal    Sensation Intact to light touch throughout sural, saphenous, superficial peroneal, deep peroneal and medial/lateral plantar nerve distributions  [place]-Louisa 5 07 filament (10g) testing deferred  Cardiovascular Brisk capillary refill < 2 seconds,intact DP and PT pulses    Special Tests + Scarlet's click left foot between 3rd & 4th metatarsals      Imaging Studies:   3 views of the LEFT foot were taken, reviewed and interpreted independently that demonstrate no acute fracture or dislocation, no osseous abnormality  Mild hallux valgus deformity  3 views of the RIGHT foot were taken, reviewed and interpreted independently that demonstrate moderate hallux valgus deformity and associated bunion  No acute fractures or dislocations  Marvina Ling Lachman, MD  Foot & Ankle Surgery   Department [address]      I personally performed the service  Marvina Ling Lachman, MD

## 2019-01-29 NOTE — PATIENT INSTRUCTIONS
MADONNA Schneider  Attending, [address] [place] [address] Office Phone: [phone] ? Fax: [phone]  [facility] Office Phone: [phone] ? GFZ:[phone]    : Leonard Sanchez MA    Surgery Coordinator Alphonso Turneres: Ashley Whitlock, [phone]  Surgery Coordinator [place]:  Juancarlos Gabriel, [phone]  www [email or web address]   PRE-OPERATIVE AND POST-OPERATIVE INSTRUCTIONS    General Information:   Your surgery is with Dr Lexi Rodriguez  Dates can change (although rare) depending on emergencies   Typical post operative visits are at the following intervals:  2-3 weeks post surgery, 6 weeks post surgery, 3 months post surgery, 6 months post surgery, and then on a yearly basis  However, this may change based on Dr Michael Martino recommendation   #1 post-operative rule for foot/ankle surgery:  ONCE YOU ARE OUT OF YOUR CAST AND/OR REMOVABLE BOOT, SWELLING MAY PERSIST FOR MANY MONTHS  YOU MIGHT ALSO EXPERIENCE A BLUISH DISCOLORATION OF YOUR LEG  THIS IS NORMAL AND PART OF THE USUAL POSTOPERATIVE EXPERIENCE  SMOKING:   Smoking results in incomplete healing of fractures (broken bones) and joints that my have been fused  Smoking and nicotine also prevents the growth of bone into ankle replacements and bone healing  It also slows the healing of muscles and skin (soft tissue)  Therefore, please do not have surgery if you continue to smoke  We reserve the right to cancel your surgery if we suspect that you are smoking  DO NOT use nicorette gum or other patches  Please find an alternative method to quit smoking before your surgery  Pre-Operative Information:   Surgery date and preoperative visits:  a  If you have medical problems, such as an abnormal EKG, history of BLOOD CLOT, ANEURYSM, and any other heart condition, please inform us so that we can get your medical clearance several weeks before the surgery    Please bring any important medical information, such as an EKG, chest x-ray, or echocardiogram, with you to ensure that your surgery will not be delayed  b  If needed, you will receive your preoperative appointments in the mail or by phone from our scheduling office  The location of the preoperative appointment will be given to you also   c  You may not eat after midnight the night before surgery  If you do, your surgery will be cancelled  d   Sonido Davis will receive a phone call from your surgery center the day before your surgery (if your surgery is on a Monday, you will get a call the Friday before)  If you do not hear from someone by 4pm the day before your surgery, please call the Surgical coordinator (number above) to notify us   Because bacterial can often enter any defect in the skin, it is important to avoid any cuts before surgery  Any breaks in the skin on the leg will often result in your surgery being postponed  Please avoid going on a very long walk the day prior to surgery, or doing other activities that could lead to irritation of the skin, including yard work, extra athletic activity, or shaving  This could result in surgery cancellation   You MUST be fasting the day of your surgery  Therefore, please do not consume any foot or beverage after midnight the night before surgery  The morning of surgery you may take your usual medications with a sip of water   It is important not to take anti-inflammatory medication like Ibuprofen, Motrin, Naproxen (Aleve), or Aspirin 7-10 days before surgery because they will make you bleed more than usual   Vitamin, E, Plavix and Coumadin also have the same effect  Stop Aspirin and Vitamin E two weeks before surgery  YOUR MEDICAL DOCTOR SHOULD TELL YOU WHEN TO STOP COUMADIN OR PLAVIX   If your surgery involves any bone healing, please do not take anti-inflammatories for at least 6 weeks after surgery    This can impede bone healing (ibuprofen, Aleve, Relafen, iodine)  Tylenol is fine to take  PREOPERATIVE BATHING INSTRUCTIONS:     Before your surgery, bathe with Hibiclens (4% Chlorhexidene) as instructed below  This skin cleanser will help reduce the bacteria on your skin before surgery  To avoid irritating your eyes, do not apply Hibiclens above the level of your neck   o On the evening before AND the morning of surgery, bathe your entire body except the face and scalp, then rinse freely  o DO NOT apply to your face or scalp, as Hibiclens can irritate your eyes   Purchasing information:   Hibiclens is available without a prescription at [facility]  ADDITIONAL INSTRUCTIONS:  PATIENTS HAVING FOOT/ANKLE SURGERY     In preparation for your upcoming surgery, we kindly request and advise the following:   Notify our office if you are taking any of the following:  Coumadin (warfarin):  Persantine (dipyridamole); Pletal (cilostazol); Plavix (clopidogrel); Ticlid (ticlopidine); Agrylin (anagrelide); Aggrenox (dipyridamole and aspirin) or other blood thinners,   In addition, stop taking Vitamin E and herbal supplements   Do on schedule any elective dental work for at least 6 months after surgery  If you had an ankle replacement, you will need to take antibiotics before any future dental procedures  Your dentist or our office can prescribe these for you  An information sheet will be given to you to give to your dentist regarding these precautions  THREE RULES:    1  After surgery you will most likely be given the instructions KEEP YOUR TOES ABOVE YOUR NOSE    This means that you MUST have your feet elevated higher than your hear  Keeping your toes above your nose helps to heal the muscles and skin (soft tissues) by reducing swelling in your leg  This position also helps to prevent infection, and is very important in avoiding deep venous thrombosis (blood clots)      2  In order to keep the blood circulating in your legs and in order to avoid deep vein   thrombosis (blood clots), we ask patients to GET UP ONCE AN HOUR during the day  This means you should at least cross the room and come back  It does not mean you have to be up for long periods of time  In most cases we will not have people immediately put any weight on their operated part  This is important to prevent loosening of metal or other devices holding the bones together  It also prevents irritation of the soft tissues which can lead to prolonged healing  When we say get up once an hour, please walk, hop or move with an assisted device  This is important! 3  Do not do any excessive walking during the first few days after surgery  Recovering from surgery is a full-time task for the patient  Postoperative care is important to avoid irritating the skin incision, which can lead to infection  Please do not plan activities or go out of town for several weeks after surgery  If you are unsure about your future activities, please schedule surgery only when you know it is acceptable for you  Scheduling surgery and then canceling the date, prevents other people from having surgery on that date as it takes time to line everything up effectively  If you cancel your surgery the week of your planned surgery, we reserve the right to cancel all future surgical procedures  THE DAY OF SURGERY:     Arrival to the hospital or outpatient surgical center on time is imperative  If you arrive late, then your surgery will be cancelled  You MUST have a family member/friend bring you, stay with you throughout the DURATION of your surgery, and drive you home   You MUST be fasting the day of your surgery  Therefore, do not consume any food or beverage after midnight the night before surgery  At your pre-operative visit with the anesthesia staff, or during your phone screen, a nurse will instruct you what medications you will need to take the day of surgery        AFTER YOUR SURGERY:   Bleeding through the bandage almost always occurs  Do not let this alarm you  Simply add more gauze or a towel, call us, and come in for a dressing change  If you think it is excessive, contact us immediately or go to the local emergency room   Do not get the bandage wet  Showering is possible with plastic protectors  Be very careful, as the bathroom can be wet and slippery  If you do get your dressing wet, it should be changed immediately  Please contact us   ONCE YOUR ARE OUT OF YOUR CAST AND/OR REMOVABLE BOOT, SWELLING MAY PERSIST FOR MANY MONTHS  YOU MIGHT ALSO EXPERIENCE A BLUISH DISCOLORATION OF YOUR LEG  THIS IS NORMAL AND PART OF THE USUAL POSTOPERATIVE EXPERIENCE  WEARING COMPRESSION HOSE (ELASTIC STOCKINGS) CAN HELP AVOID SOME OF THIS SWELLING  DRESSING:   The purpose of the surgical dressing is to keep your wound and the surgical site protected from the environment  Most dressings contain splints, which help to hold your foot and ankle in a corrected position, and also allow the surgical site to heal properly  If you have a drain in place, this will need to be removed in 1-3 days after surgery  The time for the drain to be pulled will be written on your discharge instruction sheet  CAST  INSTRUCTIONS:  You may or may not get a cast following surgery  If you do, pay close attention to the following:     After application of a splint or cast, it is very important to elevate your leg for 24 to 72 hours  The injured area should be elevated well above the heart  Remember Toes above your Nose  Rest and elevation greatly reduce pain and speed the healing process by minimizing early swelling      CALL YOUR DOCTORS OFFICE OR VISIT LOCATION EMERGENCY ROOM IF YOU HAVE ANY OF THE FOLLOWING:     Significant increased pain, which may be caused by swelling, and the feeling that the splint or cast is too tight   Numbness and tingling in your hand or foot, which may be caused by too much pressure on the nerves   Burning and stinging, which may be caused by too much pressure on the skin   Excessive swelling below the cast, which may mean the cast is slowing your blood circulation   Loss of active movement of toes, which request an urgent evaluation   Loss of capillary refill  Pinch the tip of toes and floridalma the skin  Release pressure and if the skin does not return pink then call the office immediately  DO NOT GET YOUR CAST WET  Bacteria thrive in moist dark areas  We do not want this  If your cast becomes wet, return to the office and we will apply another one  PAIN AFTER SURGERY:  Narcotic pain medication can and will depress your respiratory system if taken in excess  The goal of pain management with narcotics is to be comfortable not pain free  If you take enough narcotics to be pain free then you run the risk of stopping breathing  If this happens, call 911 immediately!  Pain in the heel is often  caused by pressure from the weight of your foot on the bed  Make sure your heel is suspended off the bed by keeping a pillow underneath your calf not your knee  Medications: You will be given narcotic pain medication  Do NOT drive while taking narcotic medications  Medications such as Darvocet, Percocet, Vicoden or Tylenol #3, also contain acetaminophen (Tylenol)  Do not take acetaminophen or Tylenol from home when taking theses medications  When you fill your prescription, you may ask the pharmacist if your pain medication has acetaminophen/Tylenol in it  It is okay to take Tylenol with Oxycontin/Oxycodone  Should you have pain after taking your prescription medication, ibuprophen (Motrin, Advil, and Alleve) is a common over the counter preparation and may often be taken with the prescription pain medication as long as you take them with food  These medications can irritate the stomach lining     Unless you are allergic to aspirin or currently taking a blood thinner, Dr Briana Khoury patients are requested to take one 325 mg aspirin every 12 hours until you are back to walking normally after surgery (This can be up to 6 weeks)  Narcotic medications commonly cause nausea  Taking them with food will decrease this side effect  If you are having extreme nausea, please contact us for an alternative medication or for something that can be taken with this medication to decrease the nausea  Also, narcotic medications frequently cause constipation  An increase of fiber, fruits and vegetables in your diet may alleviate this problem, or if necessary, you may use an over-the-counter medication such as senekot, colace, or Fibercon for constipation problems  You should resume all medications you were taking prior to the surgery unless otherwise specified  Activity:   Because of your recent foot surgery, your activity level will decrease  You will need to elevate your foot ABOVE the level of your heart for a minimum of four days  The length of time necessary for the swelling to go down, and for your wounds to heal properly depends greatly on your efforts here  Elevation is extremely important to avoid compromising the blood supply to your foot  Remember when your foot is down it will swell, which will increase pain and slow healing  Wiggle your toes frequently if possible  If you go home with a regional block, (a type of anesthesia) the foot and leg will be numb  Think of ways to get into your house and around the house until the block wears off  Keep in mind that it may be a legal issue if you drive while in a cast or splint, especially when the splint is on the right foot  You may call the Department of Motor Vehicles to schedule a road test if you have adaptive equipment applied to your car  The amount of weight you are allowed to bear on your foot will be written on your discharge sheet filled out at the time of surgery   The following is an explanation of the possibilities:     Weight bearing as tolerated (WBAT)   You may put your body weight on your foot as long as you tolerate the pain

## 2019-01-31 ENCOUNTER — ANESTHESIA EVENT (OUTPATIENT)
Dept: PERIOP | Facility: AMBULARY SURGERY CENTER | Age: 54
End: 2019-01-31
Payer: COMMERCIAL

## 2019-02-01 LAB
ATRIAL RATE: 59 BPM
P AXIS: 66 DEGREES
PR INTERVAL: 178 MS
QRS AXIS: -9 DEGREES
QRSD INTERVAL: 88 MS
QT INTERVAL: 454 MS
QTC INTERVAL: 449 MS
T WAVE AXIS: 57 DEGREES
VENTRICULAR RATE: 59 BPM

## 2019-02-01 PROCEDURE — 93010 ELECTROCARDIOGRAM REPORT: CPT | Performed by: INTERNAL MEDICINE

## 2019-02-06 NOTE — PRE-PROCEDURE INSTRUCTIONS
Pre-Surgery Instructions:   Medication Instructions    ALPRAZolam (XANAX) 0 25 mg tablet Instructed patient per Anesthesia Guidelines   Ascorbic Acid (VITAMIN C) 500 MG CAPS Instructed patient per Anesthesia Guidelines   B Complex Vitamins (VITAMIN B COMPLEX PO) Instructed patient per Anesthesia Guidelines   buPROPion (WELLBUTRIN SR) 100 mg 12 hr tablet Instructed patient per Anesthesia Guidelines   Calcium Carbonate (CALTRATE 600 PO) Instructed patient per Anesthesia Guidelines   Coenzyme Q10 (CO Q 10) 100 MG CAPS Instructed patient per Anesthesia Guidelines   liothyronine (CYTOMEL) 25 mcg tablet Instructed patient per Anesthesia Guidelines   SYNTHROID 50 MCG tablet Instructed patient per Anesthesia Guidelines      Pre op and showering instructions reviewed-Patient has hibiclens

## 2019-02-13 ENCOUNTER — HOSPITAL ENCOUNTER (OUTPATIENT)
Facility: AMBULARY SURGERY CENTER | Age: 54
Setting detail: OUTPATIENT SURGERY
Discharge: HOME | End: 2019-02-13
Attending: ORTHOPAEDIC SURGERY | Admitting: ORTHOPAEDIC SURGERY
Payer: COMMERCIAL

## 2019-02-13 ENCOUNTER — ANESTHESIA (OUTPATIENT)
Dept: PERIOP | Facility: AMBULARY SURGERY CENTER | Age: 54
End: 2019-02-13
Payer: COMMERCIAL

## 2019-02-13 VITALS
HEIGHT: 64 IN | HEART RATE: 50 BPM | WEIGHT: 118 LBS | TEMPERATURE: 98.2 F | OXYGEN SATURATION: 99 % | DIASTOLIC BLOOD PRESSURE: 68 MMHG | SYSTOLIC BLOOD PRESSURE: 122 MMHG | RESPIRATION RATE: 16 BRPM | BODY MASS INDEX: 20.14 KG/M2

## 2019-02-13 DIAGNOSIS — G57.62 MORTON'S NEUROMA OF THIRD INTERSPACE OF LEFT FOOT: Primary | ICD-10-CM

## 2019-02-13 PROCEDURE — 88304 TISSUE EXAM BY PATHOLOGIST: CPT | Performed by: PATHOLOGY

## 2019-02-13 PROCEDURE — 28080 REMOVAL OF FOOT LESION: CPT | Performed by: ORTHOPAEDIC SURGERY

## 2019-02-13 RX ORDER — MIDAZOLAM HYDROCHLORIDE 1 MG/ML
INJECTION INTRAMUSCULAR; INTRAVENOUS AS NEEDED
Status: DISCONTINUED | OUTPATIENT
Start: 2019-02-13 | End: 2019-02-13 | Stop reason: SURG

## 2019-02-13 RX ORDER — FENTANYL CITRATE 50 UG/ML
INJECTION, SOLUTION INTRAMUSCULAR; INTRAVENOUS AS NEEDED
Status: DISCONTINUED | OUTPATIENT
Start: 2019-02-13 | End: 2019-02-13 | Stop reason: SURG

## 2019-02-13 RX ORDER — OXYCODONE HYDROCHLORIDE 5 MG/1
5 TABLET ORAL EVERY 4 HOURS PRN
Qty: 30 TABLET | Refills: 0 | Status: SHIPPED | OUTPATIENT
Start: 2019-02-13 | End: 2019-02-13 | Stop reason: HOSPADM

## 2019-02-13 RX ORDER — FENTANYL CITRATE/PF 50 MCG/ML
25 SYRINGE (ML) INJECTION
Status: COMPLETED | OUTPATIENT
Start: 2019-02-13 | End: 2019-02-13

## 2019-02-13 RX ORDER — LIDOCAINE HYDROCHLORIDE 10 MG/ML
INJECTION, SOLUTION INFILTRATION; PERINEURAL AS NEEDED
Status: DISCONTINUED | OUTPATIENT
Start: 2019-02-13 | End: 2019-02-13 | Stop reason: SURG

## 2019-02-13 RX ORDER — BUPIVACAINE HYDROCHLORIDE 2.5 MG/ML
INJECTION, SOLUTION INFILTRATION; PERINEURAL AS NEEDED
Status: DISCONTINUED | OUTPATIENT
Start: 2019-02-13 | End: 2019-02-13 | Stop reason: HOSPADM

## 2019-02-13 RX ORDER — MAGNESIUM HYDROXIDE 1200 MG/15ML
LIQUID ORAL AS NEEDED
Status: DISCONTINUED | OUTPATIENT
Start: 2019-02-13 | End: 2019-02-13 | Stop reason: HOSPADM

## 2019-02-13 RX ORDER — PROPOFOL 10 MG/ML
INJECTION, EMULSION INTRAVENOUS AS NEEDED
Status: DISCONTINUED | OUTPATIENT
Start: 2019-02-13 | End: 2019-02-13 | Stop reason: SURG

## 2019-02-13 RX ORDER — SODIUM CHLORIDE 9 MG/ML
INJECTION, SOLUTION INTRAVENOUS CONTINUOUS PRN
Status: DISCONTINUED | OUTPATIENT
Start: 2019-02-13 | End: 2019-02-13 | Stop reason: SURG

## 2019-02-13 RX ORDER — ONDANSETRON 2 MG/ML
INJECTION INTRAMUSCULAR; INTRAVENOUS AS NEEDED
Status: DISCONTINUED | OUTPATIENT
Start: 2019-02-13 | End: 2019-02-13 | Stop reason: SURG

## 2019-02-13 RX ORDER — SODIUM CHLORIDE 9 MG/ML
100 INJECTION, SOLUTION INTRAVENOUS CONTINUOUS
Status: DISCONTINUED | OUTPATIENT
Start: 2019-02-13 | End: 2019-02-13 | Stop reason: HOSPADM

## 2019-02-13 RX ORDER — ASPIRIN 325 MG
325 TABLET, DELAYED RELEASE (ENTERIC COATED) ORAL EVERY 12 HOURS
Qty: 84 TABLET | Refills: 0 | Status: SHIPPED | OUTPATIENT
Start: 2019-02-13 | End: 2019-11-20 | Stop reason: ALTCHOICE

## 2019-02-13 RX ORDER — CHLORHEXIDINE GLUCONATE 4 G/100ML
SOLUTION TOPICAL DAILY PRN
Status: DISCONTINUED | OUTPATIENT
Start: 2019-02-13 | End: 2019-02-13 | Stop reason: HOSPADM

## 2019-02-13 RX ORDER — ONDANSETRON 4 MG/1
4 TABLET, FILM COATED ORAL EVERY 8 HOURS PRN
Qty: 20 TABLET | Refills: 0 | Status: SHIPPED | OUTPATIENT
Start: 2019-02-13 | End: 2019-11-20 | Stop reason: ALTCHOICE

## 2019-02-13 RX ORDER — ACETAMINOPHEN AND CODEINE PHOSPHATE 300; 30 MG/1; MG/1
1 TABLET ORAL EVERY 4 HOURS PRN
Qty: 30 TABLET | Refills: 0 | Status: SHIPPED | OUTPATIENT
Start: 2019-02-13 | End: 2019-02-23

## 2019-02-13 RX ORDER — ONDANSETRON 2 MG/ML
4 INJECTION INTRAMUSCULAR; INTRAVENOUS ONCE AS NEEDED
Status: DISCONTINUED | OUTPATIENT
Start: 2019-02-13 | End: 2019-02-13 | Stop reason: HOSPADM

## 2019-02-13 RX ADMIN — ONDANSETRON 4 MG: 2 INJECTION INTRAMUSCULAR; INTRAVENOUS at 11:37

## 2019-02-13 RX ADMIN — FENTANYL CITRATE 50 MCG: 50 INJECTION, SOLUTION INTRAMUSCULAR; INTRAVENOUS at 11:17

## 2019-02-13 RX ADMIN — FENTANYL CITRATE 50 MCG: 50 INJECTION, SOLUTION INTRAMUSCULAR; INTRAVENOUS at 11:36

## 2019-02-13 RX ADMIN — MIDAZOLAM HYDROCHLORIDE 2 MG: 1 INJECTION, SOLUTION INTRAMUSCULAR; INTRAVENOUS at 11:17

## 2019-02-13 RX ADMIN — FENTANYL CITRATE 25 MCG: 50 INJECTION, SOLUTION INTRAMUSCULAR; INTRAVENOUS at 12:35

## 2019-02-13 RX ADMIN — FENTANYL CITRATE 25 MCG: 50 INJECTION, SOLUTION INTRAMUSCULAR; INTRAVENOUS at 12:18

## 2019-02-13 RX ADMIN — FENTANYL CITRATE 25 MCG: 50 INJECTION, SOLUTION INTRAMUSCULAR; INTRAVENOUS at 12:24

## 2019-02-13 RX ADMIN — SODIUM CHLORIDE: 0.9 INJECTION, SOLUTION INTRAVENOUS at 10:55

## 2019-02-13 RX ADMIN — LIDOCAINE HYDROCHLORIDE ANHYDROUS 50 MG: 10 INJECTION, SOLUTION INFILTRATION at 11:17

## 2019-02-13 RX ADMIN — Medication 1000 MG: at 11:10

## 2019-02-13 RX ADMIN — DEXAMETHASONE SODIUM PHOSPHATE 4 MG: 10 INJECTION INTRAMUSCULAR; INTRAVENOUS at 11:25

## 2019-02-13 RX ADMIN — PROPOFOL 200 MG: 10 INJECTION, EMULSION INTRAVENOUS at 11:17

## 2019-02-13 RX ADMIN — FENTANYL CITRATE 25 MCG: 50 INJECTION, SOLUTION INTRAMUSCULAR; INTRAVENOUS at 12:29

## 2019-02-13 NOTE — OP NOTE
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]      Operative Findings:  Findings consistent with Mortons Neuroma    Complications:   None    Procedure and Technique  PRE-OP DIAGNOSIS:  Left 3rd webspace Antunez's neuroma      POST-OP DIAGNOSIS:   Left 3rd webspace Antunez's neuroma      PROCEDURE:  Left 3rd webspace Antunez's neuroma excision      ANESTHESIA:   Regional  See anesthesia report for details     OPERATIVE REPORT:    After informed consent and preoperative medical clearance, the patient taken the preoperative holding area and properly assessed  Please see the anesthesia report for details of the anesthesia administered  The patient was taken to the operating room placed supine on the operating room table  The Left lower extremity was prepped and draped in sterile fashion  Appropriate perioperative IV antibiotics were administered without complication  An ankle tourniquet was utilized  A time-out was performed with the attending surgeon the room  A dorsal incision was made over the Left 3rd webspace  Careful soft tissue dissection was maintained and blunt dissection was carried to the deeper tissues in the webspace  A lamina  was utilized to allow for better access  The transverse intermetatarsal ligament was identified and divided   The nerve was identified and  from the common digital artery  The digital nerve to the 3rd toe in the digital nerve to the 4th toe were divided  The penetrating branches to the plantar foot were carefully identified and then transected  The common digital nerve was transected approximately 2 cm proximal to the most proximal border of where the transverse intermetatarsal ligament was located  This nerve was sent to the pathology lab for tissue confirmation  Thorough irrigation was performed with copious amounts of sterile saline mixed with antibiotics  The tourniquet was released  Meticulous hemostasis was obtained for the wounds, with gentle packing of the wound and simple manual pressure  The wound was again thoroughly irrigated with copious amounts of sterile saline mixed with antibiotics  The wound was closed in layers with Vicryl suture for the subcutaneous layers and nylon suture for the skin to a tension less closure  Sterile dressings were applied and a mildly compressive dressing was placed over this  Satisfactory capillary refill remained in all toes  The patient tolerated this procedure well and was taken the recovery room stable condition  DISPOSITION:   1  Patient is stable to PACU  2  PO shoe  3  FWB immediately postoperatively  4  Follow-up in 3 weeks for suture removal   5  See Patient Instructions for full disposition, including weightbearing status, DVT prophylaxis, postoperative care and follow-up         I was present for the entire procedure    Patient Disposition:  PACU     SIGNATURE: Andrew Garza MD  DATE: February 13, 2019  TIME: 7:47 AM

## 2019-02-13 NOTE — ANESTHESIA POSTPROCEDURE EVALUATION
Post-Op Assessment Note    CV Status:  Stable    Pain management: adequate     Mental Status:  Awake   Hydration Status:  Stable   PONV Controlled:  None   Airway Patency:  Patent   Post Op Vitals Reviewed: Yes      Staff: Anesthesiologist, CRNA   Comments: This note is written after the fact, however, the pt was evaluated immediately upon arrival to PACU  Her VS's were stable and she was breathing well and without complaints            /74 (02/13/19 1243)    Temp (!) 97 4 °F (36 3 °C) (02/13/19 1243)    Pulse (!) 46 (02/13/19 1243)   Resp 14 (02/13/19 1243)    SpO2 99 % (02/13/19 1243)

## 2019-02-13 NOTE — DISCHARGE INSTRUCTIONS
MADONNA Springer  Attending, [address] [place] [address] Office Phone: [phone] ? Fax: [phone]  [address] [place] [address] Office Phone: [phone] ? [facility]:[phone]    : Bethanie Garcia, [address] [facility]    Surgery Coordinator [facility]: [facility], [phone]  Surgery Coordinator [address] [place] [address]:  Virginiajason JonesJin, [phone]  www [email or web address]   PRE-OPERATIVE AND POST-OPERATIVE INSTRUCTIONS    General Information:   Typical post operative visits are at the following intervals:  2-3 weeks post surgery, 6 weeks post surgery, 3 months post surgery, 6 months post surgery, and then on a yearly basis  However, this may change based on Dr Russell Dowd recommendation   #1 post-operative rule for foot/ankle surgery:  ONCE YOU ARE OUT OF YOUR CAST AND/OR REMOVABLE BOOT, SWELLING MAY PERSIST FOR MANY MONTHS  YOU MIGHT ALSO EXPERIENCE A BLUISH DISCOLORATION OF YOUR LEG  THIS IS NORMAL AND PART OF THE USUAL POSTOPERATIVE EXPERIENCE    DO NOT WAIT UNTIL YOUR BLOCK WEARS OFF TO TAKE YOUR PAIN MEDICATION  IT TAKES A FEW DOSES OF THE PAIN MEDICATION TO REACH A THERAPEUTIC LEVEL  TAKE A TABLET PROACTIVELY BEFORE YOU HAVE ANY PAIN AND AGAIN 4 HOURS LATER SO WHEN THE BLOCK WEARS OFF, YOU ARE NOT CAUGHT OFF GUARD  SMOKING:   Smoking results in incomplete healing of fractures (broken bones) and joints that my have been fused  Smoking and nicotine also prevents the growth of bone into ankle replacements and bone healing  It also slows the healing of muscles and skin (soft tissue)  Therefore, please do not have surgery if you continue to smoke  We reserve the right to cancel your surgery if we suspect that you are smoking  DO NOT use nicorette gum or other patches  Please find an alternative method to quit smoking before your surgery and do not restart after surgery to allow for healing  THREE RULES:    1   After surgery you will most likely be given the instructions KEEP YOUR TOES ABOVE YOUR NOSE    This means that you MUST have your feet elevated higher than your hear  Keeping your toes above your nose helps to heal the muscles and skin (soft tissues) by reducing swelling in your leg  This position also helps to prevent infection, and is very important in avoiding deep venous thrombosis (blood clots)  2  In order to keep the blood circulating in your legs and in order to avoid deep vein   thrombosis (blood clots), we ask patients to GET UP ONCE AN HOUR during the day  This means you should at least cross the room and come back  It does not mean you have to be up for long periods of time  In most cases we will not have people immediately put any weight on their operated part  This is important to prevent loosening of metal or other devices holding the bones together  It also prevents irritation of the soft tissues which can lead to prolonged healing  When we say get up once an hour, please walk, hop or move with an assisted device  This is important! 3  Do not do any excessive walking during the first few days after surgery  Recovering from surgery is a full-time task for the patient  Postoperative care is important to avoid irritating the skin incision, which can lead to infection  Please do not plan activities or go out of town for several weeks after surgery  If you are unsure about your future activities, please schedule surgery only when you know it is acceptable for you  Scheduling surgery and then canceling the date, prevents other people from having surgery on that date as it takes time to line everything up effectively  If you cancel your surgery the week of your planned surgery, we reserve the right to cancel all future surgical procedures  AFTER YOUR SURGERY:   Bleeding through the bandage almost always occurs  Do not let this alarm you    Simply add more gauze or a towel, call us, and come in for a dressing change  If you think it is excessive, contact us immediately or go to the local emergency room   Do not get the bandage wet  Showering is possible with plastic protectors  Be very careful, as the bathroom can be wet and slippery  If you do get your dressing wet, it should be changed immediately  Please contact us   ONCE YOUR ARE OUT OF YOUR CAST AND/OR REMOVABLE BOOT, SWELLING MAY PERSIST FOR MANY MONTHS  YOU MIGHT ALSO EXPERIENCE A BLUISH DISCOLORATION OF YOUR LEG  THIS IS NORMAL AND PART OF THE USUAL POSTOPERATIVE EXPERIENCE  WEARING COMPRESSION HOSE (ELASTIC STOCKINGS) CAN HELP AVOID SOME OF THIS SWELLING  DRESSING:   The purpose of the surgical dressing is to keep your wound and the surgical site protected from the environment  Most dressings contain splints, which help to hold your foot and ankle in a corrected position, and also allow the surgical site to heal properly  If you have a drain in place, this will need to be removed in 1-3 days after surgery  The time for the drain to be pulled will be written on your discharge instruction sheet  CAST  INSTRUCTIONS:  You may or may not get a cast following surgery  If you do, pay close attention to the following:     After application of a splint or cast, it is very important to elevate your leg for 24 to 72 hours  The injured area should be elevated well above the heart  Remember Toes above your Nose  Rest and elevation greatly reduce pain and speed the healing process by minimizing early swelling      CALL YOUR DOCTORS OFFICE OR VISIT LOCATION EMERGENCY ROOM IF YOU HAVE ANY OF THE FOLLOWING:     Significant increased pain, which may be caused by swelling, and the feeling that the splint or cast is too tight   Numbness and tingling in your hand or foot, which may be caused by too much pressure on the nerves   Burning and stinging, which may be caused by too much pressure on the skin   Excessive swelling below the cast, which may mean the cast is slowing your blood circulation   Loss of active movement of toes, which request an urgent evaluation   Loss of capillary refill  Pinch the tip of toes and floridalma the skin  Release pressure and if the skin does not return pink then call the office immediately  DO NOT GET YOUR CAST WET  Bacteria thrive in moist dark areas  We do not want this  If your cast becomes wet, return to the office and we will apply another one  PAIN AFTER SURGERY:  Narcotic pain medication can and will depress your respiratory system if taken in excess  The goal of pain management with narcotics is to be comfortable not pain free  If you take enough narcotics to be pain free then you run the risk of stopping breathing  If this happens, call 911 immediately!  Pain in the heel is often  caused by pressure from the weight of your foot on the bed  Make sure your heel is suspended off the bed by keeping a pillow underneath your calf not your knee  Medications: You will be given narcotic pain medication  Do NOT drive while taking narcotic medications  Medications such as Darvocet, Percocet, Vicoden or Tylenol #3, also contain acetaminophen (Tylenol)  Do not take acetaminophen or Tylenol from home when taking theses medications  When you fill your prescription, you may ask the pharmacist if your pain medication has acetaminophen/Tylenol in it  It is okay to take Tylenol with Oxycontin/Oxycodone  Should you have pain after taking your prescription medication, ibuprophen (Motrin, Advil, and Alleve) is a common over the counter preparation and may often be taken with the prescription pain medication as long as you take them with food  These medications can irritate the stomach lining     Unless you are allergic to aspirin or currently taking a blood thinner, Dr Ainsley Hatchet patients are requested to take one 325 mg aspirin every 12 hours until you are back to walking normally after surgery (This can be up to 6 weeks)  Ecotrin (Enteric-coated aspirin) is more sensitive to the stomach and we recommend purchasing this instead of regular aspirin to minimize the risk of stomach irritation  Narcotic medications commonly cause nausea  Taking them with food will decrease this side effect  If you are having extreme nausea, please contact us for an alternative medication or for something that can be taken with this medication to decrease the nausea  Also, narcotic medications frequently cause constipation  An increase of fiber, fruits and vegetables in your diet may alleviate this problem, or if necessary, you may use an over-the-counter medication such as senekot, colace, or Fibercon for constipation problems  You should resume all medications you were taking prior to the surgery unless otherwise specified  Activity:   Because of your recent foot surgery, your activity level will decrease  You will need to elevate your foot ABOVE the level of your heart for a minimum of four days  The length of time necessary for the swelling to go down, and for your wounds to heal properly depends greatly on your efforts here  Elevation is extremely important to avoid compromising the blood supply to your foot  Remember when your foot is down it will swell, which will increase pain and slow healing  Wiggle your toes frequently if possible  If you go home with a regional block, (a type of anesthesia) the foot and leg will be numb  Think of ways to get into your house and around the house until the block wears off  Keep in mind that it may be a legal issue if you drive while in a cast or splint, especially when the splint is on the right foot  You may call the Department of Motor Vehicles to schedule a road test if you have adaptive equipment applied to your car  The amount of weight you are allowed to bear on your foot will be written on your discharge sheet filled out at the time of surgery   The following is an explanation of the possibilities:       Heel-only weight bearing:   Usually, this order is given for use with a special shoe only, which will help you to put weight only on your heel  You may bear your body weight on your foot, as long as it is only borne on your heel

## 2019-02-13 NOTE — ANESTHESIA PREPROCEDURE EVALUATION
Review of Systems/Medical History    Chart reviewed  History of anesthetic complications PONV    Cardiovascular  EKG reviewed, Negative cardio ROS Exercise tolerance (METS): >4,     Pulmonary  Negative pulmonary ROS        GI/Hepatic  Negative GI/hepatic ROS               Endo/Other  History of thyroid disease , hypothyroidism,      GYN    Hysterectomy,        Hematology   Musculoskeletal       Neurology  Negative neurology ROS      Psychology   Depression ,              Physical Exam    Airway  Comment: Long neck  Mallampati score: III  TM Distance: >3 FB  Neck ROM: full     Dental   Comment: Normal teeth  Pre-implant placed right low/back,     Cardiovascular  Comment: Negative ROS,     Pulmonary      Other Findings        Anesthesia Plan  ASA Score- 2     Anesthesia Type- general with ASA Monitors  Additional Monitors:   Airway Plan: LMA  Plan Factors-    Induction- intravenous  Postoperative Plan-     Informed Consent- Anesthetic plan and risks discussed with patient  I personally reviewed this patient with the CRNA  Discussed and agreed on the Anesthesia Plan with the CRNA  Jaxson Kincaid

## 2019-02-15 ENCOUNTER — TELEPHONE (OUTPATIENT)
Dept: OBGYN CLINIC | Facility: HOSPITAL | Age: 54
End: 2019-02-15

## 2019-02-15 NOTE — TELEPHONE ENCOUNTER
Gloria Márquez   #: [phone]    Patient called in requesting a call back  She had surgery 2/13 L foot  Patient would like to know if she needs to change her bandages on her foot? Because it has some dry blood on it  Please advise, thanks

## 2019-02-15 NOTE — TELEPHONE ENCOUNTER
She may remove the dressing Monday and redress only if she wants/ needs to  Usually, I just leave the dressing in place until her post-op visit

## 2019-02-27 NOTE — TELEPHONE ENCOUNTER
Dominic Miller wanted to confirm that her first follow up from 2/13/19 foot surgery w/Dr Lachman is ok for 3/8/19; she's worried it is too far out    Best contact [phone]

## 2019-02-28 NOTE — TELEPHONE ENCOUNTER
I spoke with patient and advised that Dr Fanta Escalona prefers postop suture removal for 3 weeks  3/8 is 3 week time frame and is appropriate

## 2019-03-08 ENCOUNTER — OFFICE VISIT (OUTPATIENT)
Dept: OBGYN CLINIC | Facility: CLINIC | Age: 54
End: 2019-03-08

## 2019-03-08 VITALS
DIASTOLIC BLOOD PRESSURE: 83 MMHG | HEIGHT: 64 IN | BODY MASS INDEX: 20.14 KG/M2 | SYSTOLIC BLOOD PRESSURE: 125 MMHG | WEIGHT: 118 LBS | HEART RATE: 60 BPM

## 2019-03-08 DIAGNOSIS — Z98.890 S/P FOOT SURGERY, LEFT: Primary | ICD-10-CM

## 2019-03-08 PROCEDURE — 99024 POSTOP FOLLOW-UP VISIT: CPT | Performed by: ORTHOPAEDIC SURGERY

## 2019-03-08 RX ORDER — OXYCODONE HYDROCHLORIDE 5 MG/1
TABLET ORAL
COMMUNITY
Start: 2019-02-13 | End: 2019-11-20 | Stop reason: ALTCHOICE

## 2019-03-08 NOTE — PROGRESS NOTES
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       Date of surgery:   2/13/19      PLAN  1  Weightbearing Status- WBAT operative extremity  2  DVT prophylaxis- ASA 325mg BID- STOP  3  Continue to elevate 23hrs/day getting up 1x per hour to prevent a blood clot  4  Pain control- OTC pain medication  5  RTC in leave appt open ended  6  Xrays needed next visit - no  7  Do not soak incision in water, gently wash and pat dry  8  Can do scar massage once scab has fallen off on its own  History of Present Illness:   Chief Complaint:   Chief Complaint   Patient presents with   Jazmine Vora is a 49 y o  female who is being seen for post-operative visit for the above procedure  Pain is well controlled and the patient has successfully transitioned to OTC pain medicines  she is taking ASA 325mg BID for DVT prophylaxis  Patient has been WBAT in a regular snow boot  Review of Systems:  General- denies fever/chills  Respiratory- denies cough or SOB  Cardio- denies chest pain or palpitations  GI- denies abdominal pain  Musculoskeletal- Negative except noted above  Skin- denies rashes or wounds    Physical Exam:   /83 (BP Location: Right arm, Patient Position: Sitting, Cuff Size: Adult)   Pulse 60   Ht 5' 4" (1 626 m)   Wt 53 5 kg (118 lb)   BMI 20 25 kg/m²   General/Constitutional: No apparent distress: well-nourished and well developed    Eyes: normal ocular motion  Lymphatic: No appreciable lymphadenopathy  Respiratory: Non-labored breathing  Vascular: No edema, swelling or tenderness, except as noted in detailed exam   Integumentary: No impressive skin lesions present, except as noted in detailed exam   Neuro: No ataxia or tremors noted  Psych: Normal mood and affect, oriented to person, place and time  Appropriate affect  Musculoskeletal: Normal, except as noted in detailed exam and in HPI  Examination    left        Incision Clean, dry, intact  Sutures Removed this visit    Ecchymosis none    Swelling mild    Sensation Intact to light touch throughout sural, saphenous, superficial peroneal, deep peroneal and medial/lateral plantar nerve distributions  Gillett-Louisa 5 07 filament (10g) testing deferred  Cardiovascular Brisk capillary refill < 2 seconds,intact DP and PT pulses    Special Tests None      Imaging Studies:   None to review        Greggory Dutch Lachman, MD  Foot & Ankle Surgery   Department [address]      I personally performed the service  Greggory Dutch Lachman, MD      Scribe Attestation    I,:   Billie Dang am acting as a scribe while in the presence of the attending physician :        I,:   Santos Little MD personally performed the services described in this documentation    as scribed in my presence :

## 2019-03-13 ENCOUNTER — EVALUATION (OUTPATIENT)
Dept: OCCUPATIONAL THERAPY | Facility: CLINIC | Age: 54
End: 2019-03-13
Payer: COMMERCIAL

## 2019-03-13 ENCOUNTER — TRANSCRIBE ORDERS (OUTPATIENT)
Dept: PHYSICAL THERAPY | Facility: CLINIC | Age: 54
End: 2019-03-13

## 2019-03-13 DIAGNOSIS — G56.01 CARPAL TUNNEL SYNDROME ON RIGHT: Primary | ICD-10-CM

## 2019-03-13 DIAGNOSIS — G56.01 CARPAL TUNNEL SYNDROME OF RIGHT WRIST: Primary | ICD-10-CM

## 2019-03-13 PROCEDURE — 97165 OT EVAL LOW COMPLEX 30 MIN: CPT

## 2019-03-13 PROCEDURE — 97140 MANUAL THERAPY 1/> REGIONS: CPT

## 2019-03-13 PROCEDURE — L3906 WHO W/O JOINTS CF: HCPCS

## 2019-03-13 NOTE — PROGRESS NOTES
Daily Note     Today's date: 3/13/2019  Patient name: Ron Moon  : 1965  MRN: [id number]  Referring provider: Jada Chino MD  Dx:   Encounter Diagnosis     ICD-10-CM    1  Carpal tunnel syndrome of right wrist G56 01                   Subjective: It gets horrible at night      Objective: See treatment diary below      Assessment: Tolerated treatment well  Patient would benefit from continued OT    HEP: MNG    Plan: Continue per plan of care        Precautions: Universal    Daily Treatment Diary     Manual  3/13            [facility] 10'            [facility]             MNG                                           Exercise Diary              Wrist strengthening             Pinch Board             Wrist Maze             Isotubes in theraputty                                                                                                                                                                                                                                 Modalities  3/13            [facility] 5'            Parafin

## 2019-03-13 NOTE — PROGRESS NOTES
OT Evaluation     Today's date: 3/13/2019  Patient name: Eliceo Palma  : 1965  MRN: [id number]  Referring provider: Dulce Berry MD  Dx:   Encounter Diagnosis     ICD-10-CM    1  Carpal tunnel syndrome of right wrist G56 01                   Assessment  Assessment details: Bárbara Latif has been experiencing carpal tunnel symptoms for the past 10 years with symptoms intensifying over the past 2 months  She has previously had bracing and PT that has worked positively for her  She presents with decreased strength and increased pain that is worse at night  She has a positive Tinel's at the median nerve at the wrist  She would benefit from OT to decrease her pain, numbness and tingling, and increase strength in order to improve overall function  See detailed assessment below  Impairments: impaired physical strength and pain with function    Goals  STG 1: Decrease pain by 6/10 in 4-6 weeks  STG 2: Increase strength by 10# in 4-6 weeks  STG 3: Compliant with HEP in 2 weeks  STG 4: Increase FOTO by 5 points in 4-6 weeks    LTG 1: Complete all ADL/IADLs improved to prior level of function within 6-8 weeks  LTG 2: Work skills improved to prior level of function within 6-8 weeks  LTG 3: Strength will be improved by 15# in 6-8 weeks  LTG 4: Increase in FOTO scores by 10 points by discharge    Patient Goal: To not wake up at night secondary to numbness and tingling      Plan  Plan details: 1  Decrease numbness and tingling  2  Increase strength    Patient is only attending therapy 1x/week secondary to limited visits    Patient would benefit from: custom splinting, OT eval and skilled occupational therapy  Planned modality interventions: thermotherapy: paraffin bath, thermotherapy: hydrocollator packs, ultrasound and fluidotherapy  Planned therapy interventions: Nicole taping, manual therapy, orthotic fitting/training, therapeutic activities, therapeutic exercise, functional ROM exercises, graded exercise, graded activity, home exercise program and fine motor coordination training  Frequency: 1x week  Duration in visits: 6  Duration in weeks: 6  Plan of Care beginning date: 3/13/2019  Plan of Care expiration date: 2019  Treatment plan discussed with: patient        Subjective Evaluation    History of Present Illness  Onset date: started 10 years ago  Mechanism of injury: Dwight Moe has been experiencing carpal tunnel symptoms for the past 10 years  She has previously had therapy and night time bracing that she has had success with  2 months ago her night time brace broke and since she has experienced an increased of symptoms throughout the day that become much more intense at night which wakes her up at night  Pain  Current pain ratin  At best pain ratin  At worst pain rating: 10  Quality: burning, needle-like and discomfort    Hand dominance: right    Treatments  Previous treatment: physical therapy        Objective     Observations     Additional Observation Details  Increased crepitus over thumb CMC joint on volar palm, no c/o of pain  Active Range of Motion     Right Wrist   Wrist flexion: WFL  Wrist extension: [facility]    Strength/Myotome Testing     Left Wrist/Hand      (2nd hand position)     Trial 1: 65 4    Thumb Strength  Key/Lateral Pinch     Trail 1: 12 7  Tip/Two-Point Pinch     Trial 1: 6 2  Palmar/Three-Point Pinch     Trial 1: 6 3    Right Wrist/Hand   Wrist extension: 5  Wrist flexion: 5     (2nd hand position)     Trial 1: 58 5    Thumb Strength   Key/Lateral Pinch     Trial 1: 4 8  Tip/Two-Point Pinch     Trial 1: 3 4  Palmar/Three-Point Pinch     Trial 1: 3 9    Additional Strength Details  3+ OP    Tests     Left Wrist/Hand   Positive Tinel's sign (medial nerve)       Additional Tests Details  SW: 2 83 all digits but SF, 3 61

## 2019-03-13 NOTE — LETTER
2019    Madi Kent MD  [address] [employer] [address]    Patient: Neri Ordonez   YOB: 1965   Date of Visit: 3/13/2019     Encounter Diagnosis     ICD-10-CM    1  Carpal tunnel syndrome of right wrist G56 01        Dear Dr Gricel Velasco:    Please review the attached Plan of Care from Neri Ordonez recent visit  Please verify that you agree therapy should continue by signing the attached document and sending it back to our office  If you have any questions or concerns, please don't hesitate to call  Sincerely,    Ihsan Reyes OT      Referring Provider:     I certify that I have read the below Plan of Care and certify the need for these services furnished under this plan of treatment while under my care  Madi Kent MD  [address]: [phone]        OT Evaluation     Today's date: 3/13/2019  Patient name: Neri Ordonez  : 1965  MRN: [id number]  Referring provider: Prasanna Esquivel MD  Dx:   Encounter Diagnosis     ICD-10-CM    1  Carpal tunnel syndrome of right wrist G56 01                   Assessment  Assessment details: Sonali Allen has been experiencing carpal tunnel symptoms for the past 10 years with symptoms intensifying over the past 2 months  She has previously had bracing and PT that has worked positively for her  She presents with decreased strength and increased pain that is worse at night  She has a positive Tinel's at the median nerve at the wrist  She would benefit from OT to decrease her pain, numbness and tingling, and increase strength in order to improve overall function  See detailed assessment below  Impairments: impaired physical strength and pain with function    Goals  STG 1: Decrease pain by 6/10 in 4-6 weeks  STG 2: Increase strength by 10# in 4-6 weeks  STG 3: Compliant with HEP in 2 weeks    STG 4: Increase FOTO by 5 points in 4-6 weeks    LTG 1: Complete all ADL/IADLs improved to prior level of function within 6-8 weeks  LTG 2: Work skills improved to prior level of function within 6-8 weeks  LTG 3: Strength will be improved by 15# in 6-8 weeks  LTG 4: Increase in FOTO scores by 10 points by discharge    Patient Goal: To not wake up at night secondary to numbness and tingling      Plan  Plan details: 1  Decrease numbness and tingling  2  Increase strength    Patient is only attending therapy 1x/week secondary to limited visits  Patient would benefit from: custom splinting, OT eval and skilled occupational therapy  Planned modality interventions: thermotherapy: paraffin bath, thermotherapy: hydrocollator packs, ultrasound and fluidotherapy  Planned therapy interventions: Nicole taping, manual therapy, orthotic fitting/training, therapeutic activities, therapeutic exercise, functional ROM exercises, graded exercise, graded activity, home exercise program and fine motor coordination training  Frequency: 1x week  Duration in visits: 6  Duration in weeks: 6  Plan of Care beginning date: 3/13/2019  Plan of Care expiration date: 2019  Treatment plan discussed with: patient        Subjective Evaluation    History of Present Illness  Onset date: started 10 years ago  Mechanism of injury: Laurie Osorio has been experiencing carpal tunnel symptoms for the past 10 years  She has previously had therapy and night time bracing that she has had success with  2 months ago her night time brace broke and since she has experienced an increased of symptoms throughout the day that become much more intense at night which wakes her up at night    Pain  Current pain ratin  At best pain ratin  At worst pain rating: 10  Quality: burning, needle-like and discomfort    Hand dominance: right    Treatments  Previous treatment: physical therapy        Objective     Observations     Additional Observation Details  Increased crepitus over thumb CMC joint on volar palm, no c/o of pain  Active Range of Motion     Right Wrist   Wrist flexion: WFL  Wrist extension: [facility]    Strength/Myotome Testing     Left Wrist/Hand      (2nd hand position)     Trial 1: 65 4    Thumb Strength  Key/Lateral Pinch     Trail 1: 12 7  Tip/Two-Point Pinch     Trial 1: 6 2  Palmar/Three-Point Pinch     Trial 1: 6 3    Right Wrist/Hand   Wrist extension: 5  Wrist flexion: 5     (2nd hand position)     Trial 1: 58 5    Thumb Strength   Key/Lateral Pinch     Trial 1: 4 8  Tip/Two-Point Pinch     Trial 1: 3 4  Palmar/Three-Point Pinch     Trial 1: 3 9    Additional Strength Details  3+ OP    Tests     Left Wrist/Hand   Positive Tinel's sign (medial nerve)  Additional Tests Details  SW: 2 83 all digits but SF, 3 61              Daily Note     Today's date: 3/13/2019  Patient name: Babak Peraza  : 1965  MRN: [id number]  Referring provider: Lee Ann Watters MD  Dx:   Encounter Diagnosis     ICD-10-CM    1  Carpal tunnel syndrome of right wrist G56 01                   Subjective: It gets horrible at night      Objective: See treatment diary below      Assessment: Tolerated treatment well  Patient would benefit from continued OT    HEP: MNG    Plan: Continue per plan of care        Precautions: Universal    Daily Treatment Diary     Manual  3/13            STM 10'            IASTM             MNG                                           Exercise Diary              Wrist strengthening             Pinch Board             Wrist Maze             Isotubes in theraputty                                                                                                                                                                                                                                 Modalities  3/13            MHP 5'            Parafin

## 2019-03-21 ENCOUNTER — TRANSCRIBE ORDERS (OUTPATIENT)
Dept: LAB | Facility: CLINIC | Age: 54
End: 2019-03-21

## 2019-03-21 ENCOUNTER — APPOINTMENT (OUTPATIENT)
Dept: LAB | Facility: CLINIC | Age: 54
End: 2019-03-21
Payer: COMMERCIAL

## 2019-03-21 DIAGNOSIS — R79.89 HYPOURICEMIA: ICD-10-CM

## 2019-03-21 DIAGNOSIS — E03.9 MYXEDEMA HEART DISEASE: Primary | ICD-10-CM

## 2019-03-21 DIAGNOSIS — R00.2 PALPITATIONS: ICD-10-CM

## 2019-03-21 DIAGNOSIS — I51.9 MYXEDEMA HEART DISEASE: ICD-10-CM

## 2019-03-21 DIAGNOSIS — I51.9 MYXEDEMA HEART DISEASE: Primary | ICD-10-CM

## 2019-03-21 DIAGNOSIS — E03.9 MYXEDEMA HEART DISEASE: ICD-10-CM

## 2019-03-21 LAB
ALBUMIN SERPL BCP-MCNC: 3.7 G/DL (ref 3.5–5)
ALP SERPL-CCNC: 81 U/L (ref 46–116)
ALT SERPL W P-5'-P-CCNC: 31 U/L (ref 12–78)
ANION GAP SERPL CALCULATED.3IONS-SCNC: 6 MMOL/L (ref 4–13)
AST SERPL W P-5'-P-CCNC: 22 U/L (ref 5–45)
BASOPHILS # BLD AUTO: 0.01 THOUSANDS/ΜL (ref 0–0.1)
BASOPHILS NFR BLD AUTO: 0 % (ref 0–1)
BILIRUB SERPL-MCNC: 0.6 MG/DL (ref 0.2–1)
BUN SERPL-MCNC: 15 MG/DL (ref 5–25)
CALCIUM SERPL-MCNC: 9.1 MG/DL (ref 8.3–10.1)
CHLORIDE SERPL-SCNC: 104 MMOL/L (ref 100–108)
CO2 SERPL-SCNC: 30 MMOL/L (ref 21–32)
CREAT SERPL-MCNC: 0.78 MG/DL (ref 0.6–1.3)
EOSINOPHIL # BLD AUTO: 0.09 THOUSAND/ΜL (ref 0–0.61)
EOSINOPHIL NFR BLD AUTO: 3 % (ref 0–6)
ERYTHROCYTE [DISTWIDTH] IN BLOOD BY AUTOMATED COUNT: 11.7 % (ref 11.6–15.1)
GFR SERPL CREATININE-BSD FRML MDRD: 87 ML/MIN/1.73SQ M
GLUCOSE P FAST SERPL-MCNC: 83 MG/DL (ref 65–99)
HCT VFR BLD AUTO: 42.1 % (ref 34.8–46.1)
HGB BLD-MCNC: 13.7 G/DL (ref 11.5–15.4)
IMM GRANULOCYTES # BLD AUTO: 0.01 THOUSAND/UL (ref 0–0.2)
IMM GRANULOCYTES NFR BLD AUTO: 0 % (ref 0–2)
LYMPHOCYTES # BLD AUTO: 1.22 THOUSANDS/ΜL (ref 0.6–4.47)
LYMPHOCYTES NFR BLD AUTO: 34 % (ref 14–44)
MCH RBC QN AUTO: 30.2 PG (ref 26.8–34.3)
MCHC RBC AUTO-ENTMCNC: 32.5 G/DL (ref 31.4–37.4)
MCV RBC AUTO: 93 FL (ref 82–98)
MONOCYTES # BLD AUTO: 0.36 THOUSAND/ΜL (ref 0.17–1.22)
MONOCYTES NFR BLD AUTO: 10 % (ref 4–12)
NEUTROPHILS # BLD AUTO: 1.9 THOUSANDS/ΜL (ref 1.85–7.62)
NEUTS SEG NFR BLD AUTO: 53 % (ref 43–75)
NRBC BLD AUTO-RTO: 0 /100 WBCS
PLATELET # BLD AUTO: 175 THOUSANDS/UL (ref 149–390)
PMV BLD AUTO: 9.7 FL (ref 8.9–12.7)
POTASSIUM SERPL-SCNC: 4.1 MMOL/L (ref 3.5–5.3)
PROT SERPL-MCNC: 7 G/DL (ref 6.4–8.2)
RBC # BLD AUTO: 4.53 MILLION/UL (ref 3.81–5.12)
SODIUM SERPL-SCNC: 140 MMOL/L (ref 136–145)
TSH SERPL DL<=0.05 MIU/L-ACNC: 1.29 UIU/ML (ref 0.36–3.74)
WBC # BLD AUTO: 3.59 THOUSAND/UL (ref 4.31–10.16)

## 2019-03-21 PROCEDURE — 36415 COLL VENOUS BLD VENIPUNCTURE: CPT

## 2019-03-21 PROCEDURE — 85025 COMPLETE CBC W/AUTO DIFF WBC: CPT

## 2019-03-21 PROCEDURE — 80053 COMPREHEN METABOLIC PANEL: CPT

## 2019-03-21 PROCEDURE — 84443 ASSAY THYROID STIM HORMONE: CPT

## 2019-03-27 ENCOUNTER — OFFICE VISIT (OUTPATIENT)
Dept: OCCUPATIONAL THERAPY | Facility: CLINIC | Age: 54
End: 2019-03-27
Payer: COMMERCIAL

## 2019-03-27 DIAGNOSIS — G56.01 CARPAL TUNNEL SYNDROME OF RIGHT WRIST: Primary | ICD-10-CM

## 2019-03-27 PROCEDURE — 97110 THERAPEUTIC EXERCISES: CPT

## 2019-03-27 PROCEDURE — 97022 WHIRLPOOL THERAPY: CPT

## 2019-03-27 PROCEDURE — 97140 MANUAL THERAPY 1/> REGIONS: CPT

## 2019-03-27 NOTE — PROGRESS NOTES
Daily Note     Today's date: 3/27/2019  Patient name: Leonette Scheuermann  : 1965  MRN: [id number]  Referring provider: Aleksey Waters MD  Dx:   Encounter Diagnosis     ICD-10-CM    1  Carpal tunnel syndrome of right wrist G56 01                   Subjective: The splint was bothering me, so I cut it and now it doesn't fit right      Objective: See treatment diary below Supervised by HD [id number]      Assessment: Tolerated treatment well  Patient would benefit from continued OT  Increased symptoms towards end of MNG  Splint re-molded for comfort  HEP: MNG    Plan: Continue per plan of care        Precautions: Universal    Daily Treatment Diary      Manual  3/13 3/27           STM 10'            IASTM  5'           MNG  5'                                         Exercise Diary  3/27            Wrist strengthening 2# 3x10            Pinch Board 1x            Wrist Maze 10x            Keypegs 1x                                                                                                                                                                                                                                 Modalities  3/13 3/27           MHP 5'            Parafin             Fluido  10'

## 2019-04-03 ENCOUNTER — APPOINTMENT (OUTPATIENT)
Dept: OCCUPATIONAL THERAPY | Facility: CLINIC | Age: 54
End: 2019-04-03
Payer: COMMERCIAL

## 2019-04-10 ENCOUNTER — OFFICE VISIT (OUTPATIENT)
Dept: OCCUPATIONAL THERAPY | Facility: CLINIC | Age: 54
End: 2019-04-10
Payer: COMMERCIAL

## 2019-04-10 DIAGNOSIS — G56.01 CARPAL TUNNEL SYNDROME OF RIGHT WRIST: Primary | ICD-10-CM

## 2019-04-10 PROCEDURE — 97760 ORTHOTIC MGMT&TRAING 1ST ENC: CPT

## 2019-04-10 PROCEDURE — 97140 MANUAL THERAPY 1/> REGIONS: CPT

## 2019-04-10 PROCEDURE — 97022 WHIRLPOOL THERAPY: CPT

## 2019-06-14 ENCOUNTER — OFFICE VISIT (OUTPATIENT)
Dept: ENDOCRINOLOGY | Facility: CLINIC | Age: 54
End: 2019-06-14
Payer: COMMERCIAL

## 2019-06-14 ENCOUNTER — APPOINTMENT (OUTPATIENT)
Dept: LAB | Facility: CLINIC | Age: 54
End: 2019-06-14
Payer: COMMERCIAL

## 2019-06-14 VITALS
HEART RATE: 51 BPM | WEIGHT: 124.4 LBS | SYSTOLIC BLOOD PRESSURE: 102 MMHG | HEIGHT: 64 IN | DIASTOLIC BLOOD PRESSURE: 68 MMHG | BODY MASS INDEX: 21.24 KG/M2

## 2019-06-14 DIAGNOSIS — K59.00 CONSTIPATION, UNSPECIFIED CONSTIPATION TYPE: ICD-10-CM

## 2019-06-14 DIAGNOSIS — E03.9 HYPOTHYROIDISM, UNSPECIFIED TYPE: Primary | ICD-10-CM

## 2019-06-14 LAB
T4 FREE SERPL-MCNC: 0.78 NG/DL (ref 0.76–1.46)
TSH SERPL DL<=0.05 MIU/L-ACNC: 1.25 UIU/ML (ref 0.36–3.74)

## 2019-06-14 PROCEDURE — 99214 OFFICE O/P EST MOD 30 MIN: CPT | Performed by: NURSE PRACTITIONER

## 2019-06-14 PROCEDURE — 36415 COLL VENOUS BLD VENIPUNCTURE: CPT | Performed by: NURSE PRACTITIONER

## 2019-06-14 PROCEDURE — 84439 ASSAY OF FREE THYROXINE: CPT | Performed by: NURSE PRACTITIONER

## 2019-06-14 PROCEDURE — 84443 ASSAY THYROID STIM HORMONE: CPT | Performed by: NURSE PRACTITIONER

## 2019-06-18 ENCOUNTER — TELEPHONE (OUTPATIENT)
Dept: ENDOCRINOLOGY | Facility: CLINIC | Age: 54
End: 2019-06-18

## 2019-08-29 ENCOUNTER — TELEPHONE (OUTPATIENT)
Dept: GASTROENTEROLOGY | Facility: AMBULARY SURGERY CENTER | Age: 54
End: 2019-08-29

## 2019-08-29 NOTE — TELEPHONE ENCOUNTER
Called patient, lvm informing needed to reschedule her appointment from 11/22 to 11/20 at 2:20 with Dr Abby Hale  Told patient to call in if does not work  Sent appointment card

## 2019-09-06 ENCOUNTER — TRANSCRIBE ORDERS (OUTPATIENT)
Dept: LAB | Facility: CLINIC | Age: 54
End: 2019-09-06

## 2019-09-06 ENCOUNTER — APPOINTMENT (OUTPATIENT)
Dept: LAB | Facility: CLINIC | Age: 54
End: 2019-09-06
Payer: COMMERCIAL

## 2019-09-06 DIAGNOSIS — I51.9 MYXEDEMA HEART DISEASE: ICD-10-CM

## 2019-09-06 DIAGNOSIS — E03.9 MYXEDEMA HEART DISEASE: ICD-10-CM

## 2019-09-06 DIAGNOSIS — E03.9 MYXEDEMA HEART DISEASE: Primary | ICD-10-CM

## 2019-09-06 DIAGNOSIS — I51.9 MYXEDEMA HEART DISEASE: Primary | ICD-10-CM

## 2019-09-06 LAB — TSH SERPL DL<=0.05 MIU/L-ACNC: 1.57 UIU/ML (ref 0.36–3.74)

## 2019-09-06 PROCEDURE — 84443 ASSAY THYROID STIM HORMONE: CPT

## 2019-09-06 PROCEDURE — 36415 COLL VENOUS BLD VENIPUNCTURE: CPT

## 2019-11-19 ENCOUNTER — APPOINTMENT (OUTPATIENT)
Dept: RADIOLOGY | Facility: CLINIC | Age: 54
End: 2019-11-19
Payer: COMMERCIAL

## 2019-11-19 ENCOUNTER — APPOINTMENT (OUTPATIENT)
Dept: URGENT CARE | Facility: CLINIC | Age: 54
End: 2019-11-19
Payer: OTHER MISCELLANEOUS

## 2019-11-19 DIAGNOSIS — S59.901A ELBOW INJURY, RIGHT, INITIAL ENCOUNTER: ICD-10-CM

## 2019-11-19 DIAGNOSIS — S39.92XA LOWER BACK INJURY, INITIAL ENCOUNTER: ICD-10-CM

## 2019-11-19 DIAGNOSIS — S39.92XA LOWER BACK INJURY, INITIAL ENCOUNTER: Primary | ICD-10-CM

## 2019-11-19 PROCEDURE — 73080 X-RAY EXAM OF ELBOW: CPT

## 2019-11-19 PROCEDURE — G0383 LEV 4 HOSP TYPE B ED VISIT: HCPCS | Performed by: PHYSICIAN ASSISTANT

## 2019-11-19 PROCEDURE — 99284 EMERGENCY DEPT VISIT MOD MDM: CPT | Performed by: PHYSICIAN ASSISTANT

## 2019-11-19 PROCEDURE — 72100 X-RAY EXAM L-S SPINE 2/3 VWS: CPT

## 2019-11-20 ENCOUNTER — OFFICE VISIT (OUTPATIENT)
Dept: GASTROENTEROLOGY | Facility: AMBULARY SURGERY CENTER | Age: 54
End: 2019-11-20
Payer: COMMERCIAL

## 2019-11-20 ENCOUNTER — APPOINTMENT (OUTPATIENT)
Dept: LAB | Facility: CLINIC | Age: 54
End: 2019-11-20
Payer: COMMERCIAL

## 2019-11-20 VITALS
SYSTOLIC BLOOD PRESSURE: 118 MMHG | WEIGHT: 123.6 LBS | DIASTOLIC BLOOD PRESSURE: 60 MMHG | HEART RATE: 63 BPM | HEIGHT: 64 IN | BODY MASS INDEX: 21.1 KG/M2 | TEMPERATURE: 97.6 F | RESPIRATION RATE: 18 BRPM

## 2019-11-20 DIAGNOSIS — K59.00 CONSTIPATION, UNSPECIFIED CONSTIPATION TYPE: Primary | ICD-10-CM

## 2019-11-20 DIAGNOSIS — K58.1 IRRITABLE BOWEL SYNDROME WITH CONSTIPATION: ICD-10-CM

## 2019-11-20 DIAGNOSIS — K59.00 CONSTIPATION, UNSPECIFIED CONSTIPATION TYPE: ICD-10-CM

## 2019-11-20 PROCEDURE — 86255 FLUORESCENT ANTIBODY SCREEN: CPT

## 2019-11-20 PROCEDURE — 82784 ASSAY IGA/IGD/IGG/IGM EACH: CPT

## 2019-11-20 PROCEDURE — 36415 COLL VENOUS BLD VENIPUNCTURE: CPT

## 2019-11-20 PROCEDURE — 99244 OFF/OP CNSLTJ NEW/EST MOD 40: CPT | Performed by: INTERNAL MEDICINE

## 2019-11-20 PROCEDURE — 83516 IMMUNOASSAY NONANTIBODY: CPT

## 2019-11-20 NOTE — PROGRESS NOTES
Consultation - [facility]  Toro Flanagan 49 y o  female MRN: [id number]  Unit/Bed#:  Encounter: [id number]        Consults    ASSESSMENT/PLAN:       1  Abdominal bloating/constipation-likely IBS C other less likely possibilities include abdominal discomfort secondary to adhesions versus less likely celiac disease-she does not report improvement with Linzess     -would recommend checking celiac serologies  -CBC, CMP and TSH are unremarkable   -discussed adding probiotics to the diet  -start on low FODMAP diet   -given improvement with Linzess, will start on to [facility] 3 mg on daily basis  If this does not provide any relief, may have to discuss Motegrity  ______________________________________________________________________    Reason for Consult / Principal Problem: [unfilled]    HPI: Toro Flanagan is a 49y o  year old female with history of depression, prior abdominal adhesion surgery many years ago, presents for 2nd opinion for abdominal distention, bloating and constipation that has been ongoing for the past 1 and half years  Patient underwent colonoscopy this year by [facility] which was notable for hyperplastic polyp and a tortuous colon only  She was recommended Linzess without any symptomatic relief  Patient states that she has been having small hard pebble like bowel movements 3 to 4 times a day  She reports incomplete evacuation  No loss of appetite, weight loss or hematochezia  Labs are reviewed, hemoglobin 13 7, platelets 491, LFTs are normal   TSH is normal   She is a  and does report stress at work  Review of Systems: The remainder of the review of systems was negative except for the pertinent positives noted in HPI       Historical Information   Past Medical History:   Diagnosis Date    Depression     Disease of thyroid gland     Heart palpitations     PONV (postoperative nausea and vomiting)      Past Surgical History:   Procedure Laterality Date    ABDOMINAL ADHESION SURGERY      APPENDECTOMY  [address], 1991, 2000    HYSTERECTOMY  2006    [place] EXCIS INTERDIGITAL Leah Feather Left 2/13/2019    Procedure: Asher Velazco;  Surgeon: Bailey Gallegos MD;  Location: [facility];  Service: Orthopedics     Social History   Social History     Substance and Sexual Activity   Alcohol Use Yes    Alcohol/week: 1 0 - 2 0 standard drinks    Types: 1 - 2 Glasses of wine per week    Comment: daily     Social History     Substance and Sexual Activity   Drug Use No     Social History     Tobacco Use   Smoking Status Never Smoker   Smokeless Tobacco Never Used     Family History   Problem Relation Age of Onset    Hypertension Father     Prostate cancer Brother     Stroke Maternal Grandfather        Meds/Allergies       (Not in a hospital admission)  No current facility-administered medications for this visit  No Known Allergies    Objective     Blood pressure 118/60, pulse 63, temperature 97 6 °F (36 4 °C), temperature source Tympanic, resp  rate 18, height 5' 4" (1 626 m), weight 56 1 kg (123 lb 9 6 oz)  [unfilled]    PHYSICAL EXAM     GEN: well nourished, well developed, no acute distress  HEENT: anicteric, MMM, no cervical or supraclavicular lymphadenopathy  CV: RRR, no m/r/g  CHEST: CTA b/l, no WRR  ABD: +BS, soft, NT/ND, no hepatosplenomegaly  EXT: no c/c/e  SKIN: no rashes,  NEURO: aaox3    Lab Results:   No visits with results within 1 Day(s) from this visit     Latest known visit with results is:   Appointment on 09/06/2019   Component Date Value    TSH 3RD GENERATON 09/06/2019 1 573      Imaging Studies: I have personally reviewed pertinent films in PACS

## 2019-11-20 NOTE — LETTER
November 20, 2019     Nancy Francisco, [address]    Patient: Eddie Nielson   YOB: 1965   Date of Visit: 11/20/2019       Dear Dr Genny Hartley: Thank you for referring Eddie Nielson to me for evaluation  Below are my notes for this consultation  If you have questions, please do not hesitate to call me  I look forward to following your patient along with you  Sincerely,        Marcia Ryan MD        CC: MD Marcia Zafar MD  11/20/2019  5:15 PM  Sign at close encounter  Consultation - [address] [facility]  Eddie Nielson 49 y o  female MRN: [id number]  Unit/Bed#:  Encounter: [id number]        Consults    ASSESSMENT/PLAN:       1  Abdominal bloating/constipation-likely IBS C other less likely possibilities include abdominal discomfort secondary to adhesions versus less likely celiac disease-she does not report improvement with Linzess     -would recommend checking celiac serologies  -CBC, CMP and TSH are unremarkable   -discussed adding probiotics to the diet  -start on low FODMAP diet   -given improvement with Linzess, will start on to [facility] 3 mg on daily basis  If this does not provide any relief, may have to discuss Motegrity  ______________________________________________________________________    Reason for Consult / Principal Problem: [unfilled]    HPI: Eddie Nielson is a 49y o  year old female with history of depression, prior abdominal adhesion surgery many years ago, presents for 2nd opinion for abdominal distention, bloating and constipation that has been ongoing for the past 1 and half years  Patient underwent colonoscopy this year by [facility] which was notable for hyperplastic polyp and a tortuous colon only  She was recommended Linzess without any symptomatic relief    Patient states that she has been having small hard pebble like bowel movements 3 to 4 times a day  She reports incomplete evacuation  No loss of appetite, weight loss or hematochezia  Labs are reviewed, hemoglobin 13 7, platelets 570, LFTs are normal   TSH is normal   She is a  and does report stress at work  Review of Systems: The remainder of the review of systems was negative except for the pertinent positives noted in HPI  Historical Information   Past Medical History:   Diagnosis Date    Depression     Disease of thyroid gland     Heart palpitations     PONV (postoperative nausea and vomiting)      Past Surgical History:   Procedure Laterality Date    ABDOMINAL ADHESION SURGERY      APPENDECTOMY       SECTION  , ,     HYSTERECTOMY  2006    [place] EXCIS INTERDIGITAL Reynold Cheng Left 2019    Procedure: EXCISION Wendy Kathie;  Surgeon: Krista Dyer MD;  Location: [facility];  Service: Orthopedics     Social History   Social History     Substance and Sexual Activity   Alcohol Use Yes    Alcohol/week: 1 0 - 2 0 standard drinks    Types: 1 - 2 Glasses of wine per week    Comment: daily     Social History     Substance and Sexual Activity   Drug Use No     Social History     Tobacco Use   Smoking Status Never Smoker   Smokeless Tobacco Never Used     Family History   Problem Relation Age of Onset    Hypertension Father     Prostate cancer Brother     Stroke Maternal Grandfather        Meds/Allergies       (Not in a hospital admission)  No current facility-administered medications for this visit  No Known Allergies    Objective     Blood pressure 118/60, pulse 63, temperature 97 6 °F (36 4 °C), temperature source Tympanic, resp  rate 18, height 5' 4" (1 626 m), weight 56 1 kg (123 lb 9 6 oz)      [unfilled]    PHYSICAL EXAM     GEN: well nourished, well developed, no acute distress  HEENT: anicteric, MMM, no cervical or supraclavicular lymphadenopathy  CV: RRR, no m/r/g  CHEST: CTA b/l, no WRR  ABD: +BS, soft, NT/ND, no hepatosplenomegaly  EXT: no c/c/e  SKIN: no rashes,  NEURO: aaox3    Lab Results:   No visits with results within 1 Day(s) from this visit     Latest known visit with results is:   Appointment on 09/06/2019   Component Date Value    TSH 3RD GENERATON 09/06/2019 1 573      Imaging Studies: I have personally reviewed pertinent films in PACS

## 2019-11-21 LAB
ENDOMYSIUM IGA SER QL: NEGATIVE
GLIADIN PEPTIDE IGA SER-ACNC: 4 UNITS (ref 0–19)
GLIADIN PEPTIDE IGG SER-ACNC: 1 UNITS (ref 0–19)
IGA SERPL-MCNC: 175 MG/DL (ref 87–352)
TTG IGA SER-ACNC: <2 U/ML (ref 0–3)
TTG IGG SER-ACNC: 4 U/ML (ref 0–5)

## 2019-11-22 ENCOUNTER — TELEPHONE (OUTPATIENT)
Dept: GASTROENTEROLOGY | Facility: CLINIC | Age: 54
End: 2019-11-22

## 2019-11-22 ENCOUNTER — APPOINTMENT (OUTPATIENT)
Dept: URGENT CARE | Facility: CLINIC | Age: 54
End: 2019-11-22

## 2019-11-22 NOTE — TELEPHONE ENCOUNTER
----- Message from Faustino Myrick MD sent at 11/21/2019  4:17 PM EST -----  Celiac serologies are normal

## 2019-11-25 ENCOUNTER — TELEPHONE (OUTPATIENT)
Dept: GASTROENTEROLOGY | Facility: CLINIC | Age: 54
End: 2019-11-25

## 2019-11-25 DIAGNOSIS — R14.0 ABDOMINAL BLOATING: ICD-10-CM

## 2019-11-25 DIAGNOSIS — K59.09 OTHER CONSTIPATION: Primary | ICD-10-CM

## 2019-11-25 DIAGNOSIS — R10.30 LOWER ABDOMINAL PAIN: ICD-10-CM

## 2019-11-25 NOTE — TELEPHONE ENCOUNTER
Patients GI provider:  Dr Alfred Evans    Number to return call: [phone]    Reason for call: Pt calling would like to speak with someone regarding medication that is not helping her severe pain   Bloating has increased also    Scheduled procedure/appointment date if applicable: Apt/procedure 2/25/2020

## 2019-11-25 NOTE — TELEPHONE ENCOUNTER
Triage Telephone Intake  Dr Volodymyr Delacruz  Chief complaint:lower abdominal pain/bloating   Constipation:yes   LBM:today 2 small   Pain (sharp) this am for 2 5 hours mid lower abdomin 8/10 after breakfast (scrammbled eggs, english muffin, coffee)  Pain at this time 4/10- dull ache mid lower abd  Is patient increasing po fluids:yes 32-40 ounces water in 24 hours   Diet:FODMAP (no dairy/no multi grain per patient)  Next office visit: 2/25/20 with Edgardo PATE  Current PO interventions/medications for chief complaint:Trulance daily (started 11/22/19)  Patient requesting diagnostic study of lower abdomin for continued concerns  Message relayed to [facility] Provider pool for recommendation

## 2019-11-25 NOTE — TELEPHONE ENCOUNTER
Reviewed patient's chart  I would recommend increasing fiber if not already doing so (can do 1-2 tablespoons of OTC fiber supplement like benefiber daily to help with hard stools  This can be used with Trulance  Trulance may take some time to work (it has only been 3 days since starting it) so she could use Miralax 1-2 times daily as needed for constipation at this time  I do not see any recent imaging  I would recommend a CT of the abdomen and pelvis to assess and rule out diverticulitis, obstruction, etc  I have ordered this stat as well as a BMP as she will need this prior to imaging for IV contrast (last lab draw was in march 2019)  Please assist in getting the CT scheduled  If pain is severe, she has severe nausea/vomiting, etc she should go to ED for more expedited work up

## 2019-11-26 DIAGNOSIS — K59.04 CHRONIC IDIOPATHIC CONSTIPATION: Primary | ICD-10-CM

## 2019-11-26 NOTE — TELEPHONE ENCOUNTER
Patient aware of recommendation however is very hesitant to get a CT scan done as she said she's had at least 10 scans in the past   I gave her the phone number to schedule if she decides to move forward and stressed importance that benefit may outweigh the risk  She will consider  We discussed fiber supplement and miralax and to continue trulance  She will need a prescription sent to [facility] pharmacy if you want her to continue  Thank you

## 2019-11-27 ENCOUNTER — TELEPHONE (OUTPATIENT)
Dept: GASTROENTEROLOGY | Facility: AMBULARY SURGERY CENTER | Age: 54
End: 2019-11-27

## 2019-11-27 ENCOUNTER — APPOINTMENT (OUTPATIENT)
Dept: LAB | Facility: CLINIC | Age: 54
End: 2019-11-27
Payer: COMMERCIAL

## 2019-11-27 DIAGNOSIS — R10.30 LOWER ABDOMINAL PAIN: ICD-10-CM

## 2019-11-27 DIAGNOSIS — R14.0 ABDOMINAL BLOATING: ICD-10-CM

## 2019-11-27 DIAGNOSIS — K59.09 OTHER CONSTIPATION: ICD-10-CM

## 2019-11-27 LAB
ANION GAP SERPL CALCULATED.3IONS-SCNC: 8 MMOL/L (ref 4–13)
BUN SERPL-MCNC: 15 MG/DL (ref 5–25)
CALCIUM SERPL-MCNC: 8.9 MG/DL (ref 8.3–10.1)
CHLORIDE SERPL-SCNC: 105 MMOL/L (ref 100–108)
CO2 SERPL-SCNC: 31 MMOL/L (ref 21–32)
CREAT SERPL-MCNC: 0.82 MG/DL (ref 0.6–1.3)
GFR SERPL CREATININE-BSD FRML MDRD: 82 ML/MIN/1.73SQ M
GLUCOSE SERPL-MCNC: 90 MG/DL (ref 65–140)
POTASSIUM SERPL-SCNC: 3.8 MMOL/L (ref 3.5–5.3)
SODIUM SERPL-SCNC: 144 MMOL/L (ref 136–145)

## 2019-11-27 PROCEDURE — 36415 COLL VENOUS BLD VENIPUNCTURE: CPT

## 2019-11-27 PROCEDURE — 80048 BASIC METABOLIC PNL TOTAL CA: CPT

## 2019-11-29 ENCOUNTER — TELEPHONE (OUTPATIENT)
Dept: GASTROENTEROLOGY | Facility: CLINIC | Age: 54
End: 2019-11-29

## 2019-11-29 ENCOUNTER — DOCUMENTATION (OUTPATIENT)
Dept: GASTROENTEROLOGY | Facility: MEDICAL CENTER | Age: 54
End: 2019-11-29

## 2019-11-29 NOTE — TELEPHONE ENCOUNTER
----- Message from Sandro Jacobs PA-C sent at 11/27/2019  1:47 PM EST -----  Labs normal, can proceed with CT

## 2019-12-02 NOTE — TELEPHONE ENCOUNTER
Please see communication thread  Patient is reporting constipation despite increased water and fiber intake,  trulance x 10 days and miralax once daily  She said she is stopping trulance as it is "making things worse"  Last bm was 3-4 days ago  She will continue the FODMAP diet  I suggested she increase miralax to twice daily and encouraged CT scan that was ordered  She said she is rescheduling CT scan on 12/5 as it is not convenient with the holiday but she did agree to reschedule  She will call us with update  Her f/u is scheduled 2/25 which we may have to move up if symptoms do not improve

## 2019-12-02 NOTE — TELEPHONE ENCOUNTER
She can trial the miralax BID-TID however we will consider starting her on motegrity if she has persistent symptoms when she calls us with the update

## 2020-01-07 ENCOUNTER — APPOINTMENT (OUTPATIENT)
Dept: URGENT CARE | Facility: CLINIC | Age: 55
End: 2020-01-07

## 2020-02-25 ENCOUNTER — OFFICE VISIT (OUTPATIENT)
Dept: GASTROENTEROLOGY | Facility: AMBULARY SURGERY CENTER | Age: 55
End: 2020-02-25
Payer: COMMERCIAL

## 2020-02-25 VITALS
HEIGHT: 64 IN | TEMPERATURE: 98.9 F | DIASTOLIC BLOOD PRESSURE: 78 MMHG | BODY MASS INDEX: 21.34 KG/M2 | SYSTOLIC BLOOD PRESSURE: 124 MMHG | WEIGHT: 125 LBS | HEART RATE: 52 BPM

## 2020-02-25 DIAGNOSIS — K59.04 CHRONIC IDIOPATHIC CONSTIPATION: Primary | ICD-10-CM

## 2020-02-25 DIAGNOSIS — R14.0 BLOATING: ICD-10-CM

## 2020-02-25 PROCEDURE — 99213 OFFICE O/P EST LOW 20 MIN: CPT | Performed by: PHYSICIAN ASSISTANT

## 2020-02-25 RX ORDER — LIDOCAINE 50 MG/G
PATCH TOPICAL
Refills: 0 | COMMUNITY
Start: 2019-11-27 | End: 2020-09-04 | Stop reason: ALTCHOICE

## 2020-02-25 RX ORDER — CLOTRIMAZOLE AND BETAMETHASONE DIPROPIONATE 10; .64 MG/G; MG/G
CREAM TOPICAL
COMMUNITY
Start: 2020-01-22 | End: 2020-09-04 | Stop reason: ALTCHOICE

## 2020-02-25 RX ORDER — NAPROXEN 500 MG/1
TABLET ORAL
Refills: 0 | COMMUNITY
Start: 2019-11-20 | End: 2020-09-04 | Stop reason: ALTCHOICE

## 2020-02-26 NOTE — PROGRESS NOTES
[facility] - Outpatient Follow-up Note  Eugenia Dennis 48 y o  female MRN: [id number]  Encounter: [id number]    ASSESSMENT AND PLAN:      Constipation  Bloating  -reports having 5 small hard bowel movements daily without full evacuation  -has failed miralax, linzess, trulance  -is willing to trial different medications will started with amitiza and can consider motegrity if this is ineffective  -she has trialed a probiotic and reports eating a high fiber diet  ______________________________________________________________________    SUBJECTIVE:  Patient is a 49 yo female who presents for follow up of irregular bowel habits  She reports she has about 5 very small hard stool daily and does not feel like she is fully evacuating, she has frequent gas, and bloating  She reports previously she had a regular bowel movement daily however when she started to go through menopause she noticed these changes  She had a colonoscopy recently with [facility] in 2019 with hyperplastic colon and tortuous colon otherwise unremarkable  She has trialed linzess and trulance without success, she actually feels like it made her symptoms worse  REVIEW OF SYSTEMS IS OTHERWISE NEGATIVE      Historical Information   Past Medical History:   Diagnosis Date    Depression     Disease of thyroid gland     Heart palpitations     PONV (postoperative nausea and vomiting)      Past Surgical History:   Procedure Laterality Date    ABDOMINAL ADHESION SURGERY      APPENDECTOMY  1978 [address] [place] [address], 1991, 2000    HYSTERECTOMY  2006    [place] EXCIS INTERDIGITAL Ravi Moment Left 2/13/2019    Procedure: EXCISION Dewayne Pae;  Surgeon: Maddie Awan MD;  Location: [facility];  Service: Orthopedics     Social History   Social History     Substance and Sexual Activity   Alcohol Use Yes    Alcohol/week: 1 0 - 2 0 standard drinks    Types: 1 - 2 Glasses of wine per week    Comment: socially Social History     Substance and Sexual Activity   Drug Use No     Social History     Tobacco Use   Smoking Status Never Smoker   Smokeless Tobacco Never Used     Family History   Problem Relation Age of Onset    Hypertension Father     Prostate cancer Brother     Stroke Maternal Grandfather        Meds/Allergies       Current Outpatient Medications:     ALPRAZolam (XANAX) 0 25 mg tablet    Ascorbic Acid (VITAMIN C) 500 MG CAPS    B Complex Vitamins (VITAMIN B COMPLEX PO)    buPROPion (WELLBUTRIN SR) 100 mg 12 hr tablet    Calcium Carbonate (CALTRATE 600 PO)    Coenzyme Q10 (CO Q 10) 100 MG CAPS    lidocaine (LIDODERM) 5 %    liothyronine (CYTOMEL) 25 mcg tablet    SYNTHROID 50 MCG tablet    clotrimazole-betamethasone (LOTRISONE) 1-0 05 % cream    naproxen (NAPROSYN) 500 mg tablet    Plecanatide (TRULANCE) 3 MG TABS    No Known Allergies        Objective     Blood pressure 124/78, pulse (!) 52, temperature 98 9 °F (37 2 °C), temperature source Tympanic, height 5' 4" (1 626 m), weight 56 7 kg (125 lb)  Body mass index is 21 46 kg/m²  PHYSICAL EXAM:      General Appearance:   Alert, cooperative, no distress   HEENT:   Normocephalic, atraumatic, anicteric      Neck:  Supple, symmetrical, trachea midline   Lungs:   Clear to auscultation bilaterally; no rales, rhonchi or wheezing; respirations unlabored    Heart[de-identified]   Regular rate and rhythm; no murmur, rub, or gallop  Abdomen:   Soft, non-tender, non-distended; normal bowel sounds; no masses, no organomegaly                              Lab Results:   No visits with results within 1 Day(s) from this visit     Latest known visit with results is:   Appointment on 11/27/2019   Component Date Value    Sodium 11/27/2019 144     Potassium 11/27/2019 3 8     Chloride 11/27/2019 105     CO2 11/27/2019 31     ANION GAP 11/27/2019 8     BUN 11/27/2019 15     Creatinine 11/27/2019 0 82     Glucose 11/27/2019 90     Calcium 11/27/2019 8 9     eGFR 11/27/2019 82          Radiology Results:   No results found

## 2020-02-28 ENCOUNTER — TELEPHONE (OUTPATIENT)
Dept: GASTROENTEROLOGY | Facility: CLINIC | Age: 55
End: 2020-02-28

## 2020-02-28 NOTE — TELEPHONE ENCOUNTER
----- Message from Hakeem Barillas PA-C sent at 2/25/2020  3:51 PM EST -----  Regarding: amitiza  Please let patient know that she can actually take the amitiza with food to avoid nausea, it should not effect the efficacy

## 2020-03-23 ENCOUNTER — TELEPHONE (OUTPATIENT)
Dept: GASTROENTEROLOGY | Facility: AMBULARY SURGERY CENTER | Age: 55
End: 2020-03-23

## 2020-03-23 DIAGNOSIS — K59.04 CHRONIC IDIOPATHIC CONSTIPATION: Primary | ICD-10-CM

## 2020-03-23 NOTE — TELEPHONE ENCOUNTER
Patients GI provider:  Dr Amada Pagan  Number to return call: [phone]    Reason for call: Pt was given amitiza samples and would [facility]    Scheduled procedure/appointment date if applicable: Apt/procedure na

## 2020-03-23 NOTE — TELEPHONE ENCOUNTER
I will send amitiza then but we would have to see if we can get it covered since linzess was ineffective

## 2020-03-23 NOTE — TELEPHONE ENCOUNTER
Patient is aware Amitiza script was sent to her pharmacy  She is aware Candis Ringer is preferred drug with insurance provider

## 2020-03-23 NOTE — TELEPHONE ENCOUNTER
Dr Nino Ma patient Hx constipation-last office visit 2/25/20 -trial Amitiza  Requested script for Amitiza entered -called patient to confirm sample dosage provided to her  Eulalia Munoz is preferred on her plan

## 2020-04-20 ENCOUNTER — TELEPHONE (OUTPATIENT)
Dept: INTERNAL MEDICINE CLINIC | Facility: CLINIC | Age: 55
End: 2020-04-20

## 2020-04-20 DIAGNOSIS — Z20.822 COVID-19 RULED OUT BY LABORATORY TESTING: Primary | ICD-10-CM

## 2020-04-21 DIAGNOSIS — Z20.822 COVID-19 RULED OUT BY LABORATORY TESTING: ICD-10-CM

## 2020-04-21 PROCEDURE — 87635 SARS-COV-2 COVID-19 AMP PRB: CPT

## 2020-04-22 LAB — SARS-COV-2 RNA SPEC QL NAA+PROBE: NOT DETECTED

## 2020-04-24 NOTE — TELEPHONE ENCOUNTER
Patients GI provider:  Dr Brea Sahu    Number to return call: [phone]    Reason for call: Pt calling stated [facility] was working great but now her symptoms are coming back and would like to speak with someone to discuss       Scheduled procedure/appointment date if applicable: Apt/procedure n/a

## 2020-04-24 NOTE — TELEPHONE ENCOUNTER
Spoke with patient, she reports Ish Grande was working well for several weeks with two fully formd, softer BM daily  She has been keeping a stool diary and reports on 3/39, her symptoms went back to what they were previously with 3-4 hard small stools with incomplete evacuation  She has not been taking any additional meds and is unable to tolerate fiber in powder form  She will continue amitiza 24 mcg daily, add fiber supplement with fiber gummies or alternative OTC options, continue high fiber diet, ensure good hydration, and add a stool softener   If no improvement in a few week, we may need to switch to [employer]

## 2020-05-19 ENCOUNTER — TELEMEDICINE (OUTPATIENT)
Dept: VASCULAR SURGERY | Facility: CLINIC | Age: 55
End: 2020-05-19
Payer: COMMERCIAL

## 2020-05-19 DIAGNOSIS — I83.899 SYMPTOMATIC SPIDER VARICOSE VEIN: Primary | ICD-10-CM

## 2020-05-19 PROCEDURE — 99214 OFFICE O/P EST MOD 30 MIN: CPT | Performed by: NURSE PRACTITIONER

## 2020-06-22 ENCOUNTER — TELEPHONE (OUTPATIENT)
Dept: GASTROENTEROLOGY | Facility: CLINIC | Age: 55
End: 2020-06-22

## 2020-06-22 DIAGNOSIS — R10.32 LEFT LOWER QUADRANT ABDOMINAL PAIN: ICD-10-CM

## 2020-06-22 DIAGNOSIS — K59.04 CHRONIC IDIOPATHIC CONSTIPATION: Primary | ICD-10-CM

## 2020-07-06 ENCOUNTER — OFFICE VISIT (OUTPATIENT)
Dept: OBGYN CLINIC | Facility: CLINIC | Age: 55
End: 2020-07-06
Payer: COMMERCIAL

## 2020-07-06 ENCOUNTER — APPOINTMENT (OUTPATIENT)
Dept: RADIOLOGY | Facility: AMBULARY SURGERY CENTER | Age: 55
End: 2020-07-06
Attending: SURGERY
Payer: COMMERCIAL

## 2020-07-06 VITALS
HEIGHT: 64 IN | WEIGHT: 125 LBS | BODY MASS INDEX: 21.34 KG/M2 | DIASTOLIC BLOOD PRESSURE: 77 MMHG | SYSTOLIC BLOOD PRESSURE: 116 MMHG | HEART RATE: 56 BPM

## 2020-07-06 DIAGNOSIS — M79.641 RIGHT HAND PAIN: ICD-10-CM

## 2020-07-06 DIAGNOSIS — R20.2 NUMBNESS AND TINGLING IN RIGHT HAND: Primary | ICD-10-CM

## 2020-07-06 DIAGNOSIS — R20.0 NUMBNESS AND TINGLING IN RIGHT HAND: Primary | ICD-10-CM

## 2020-07-06 PROCEDURE — 1036F TOBACCO NON-USER: CPT | Performed by: SURGERY

## 2020-07-06 PROCEDURE — 73130 X-RAY EXAM OF HAND: CPT

## 2020-07-06 PROCEDURE — 99214 OFFICE O/P EST MOD 30 MIN: CPT | Performed by: SURGERY

## 2020-07-06 PROCEDURE — 3008F BODY MASS INDEX DOCD: CPT | Performed by: SURGERY

## 2020-07-06 RX ORDER — METHYLPREDNISOLONE 4 MG/1
TABLET ORAL
Qty: 1 EACH | Refills: 0 | Status: SHIPPED | OUTPATIENT
Start: 2020-07-06 | End: 2020-09-04 | Stop reason: ALTCHOICE

## 2020-07-06 RX ORDER — MELOXICAM 7.5 MG/1
15 TABLET ORAL DAILY
Qty: 30 TABLET | Refills: 1 | Status: SHIPPED | OUTPATIENT
Start: 2020-07-06 | End: 2020-09-04 | Stop reason: ALTCHOICE

## 2020-07-06 NOTE — PROGRESS NOTES
ASSESSMENT/PLAN:      48 y o  female with right hand numbness and tingling, and moderate discomfort likely carpal tunnel syndrome  Swetha's x-ray's were reviewed with her in the office today  The etiology of carpal tunnel was discussed with Oliva Huff today  She was fit with a right sided cock up wrist brace that she will wear at night  A medrol Dosepak was prescribed today and sent to her pharmacy electronically  Once she completes the oral steroid she will start Mobic once a day  OT was ordered today for ulnar and median nerve glides  I will see her back in 6 weeks time  If her symptoms have not improved a EMG may be ordered at that time  The patient verbalized understanding of exam findings and treatment plan  We engaged in the shared decision-making process and treatment options were discussed at length with the patient  Surgical and conservative management discussed today along with risks and benefits  Diagnoses and all orders for this visit:    Numbness and tingling in right hand  -     Ambulatory referral to PT/OT hand therapy; Future    Right hand pain  -     XR hand 3+ vw right; Future      Follow Up:  Return in about 6 weeks (around 8/17/2020)  To Do Next Visit:  Re-evaluation of current issue    ____________________________________________________________________________________________________________________________________________      CHIEF COMPLAINT:  Chief Complaint   Patient presents with    Right Hand - Pain       SUBJECTIVE:  Lesli Lu is a 48y o  year old RHD female who presents to the office today for right wrist/hand pain  Oliva Huff states that she has been experiencing pain to the dorsal aspect of her right hand/wrist for a few months  She also notes numbness and tingling to her right hand  She notes that this has worsened over the last few weeks  She notes the numbness and tingling is worse at night  She is not taking anything for pain control ay this time  I have personally reviewed all the relevant PMH, PSH, SH, FH, Medications and allergies  PAST MEDICAL HISTORY:  Past Medical History:   Diagnosis Date    Depression     Disease of thyroid gland     Heart palpitations     Hypothyroidism     PONV (postoperative nausea and vomiting)        PAST SURGICAL HISTORY:  Past Surgical History:   Procedure Laterality Date    ABDOMINAL ADHESION SURGERY      [address]EN[address]  1978 [address], 1991, 2000    HYSTERECTOMY  2006    partial, still have ovaries     OTHER SURGICAL HISTORY      abdominal lysis with a band put in at age 44    LA EXCIS INTERDIGITAL Bernette Pretty Left 2/13/2019    Procedure: EXCISION Rohith Post;  Surgeon: Anmol Carr MD;  Location[facility];  Service: Orthopedics       FAMILY HISTORY:  Family History   Problem Relation Age of Onset    Hypertension Father     Prostate cancer Brother     Stroke Maternal Grandfather     Prostate cancer Other         runs strongly on father's side of family        SOCIAL HISTORY:  Social History     Tobacco Use    Smoking status: Never Smoker    Smokeless tobacco: Never Used   Substance Use Topics    Alcohol use:  Yes     Alcohol/week: 1 0 - 2 0 standard drinks     Types: 1 - 2 Glasses of wine per week     Comment: socially    Drug use: No       MEDICATIONS:    Current Outpatient Medications:     ALPRAZolam (XANAX) 0 25 mg tablet, Take by mouth as needed for anxiety For Flying only , Disp: , Rfl:     Ascorbic Acid (VITAMIN C) 500 MG CAPS, Take 1 capsule by mouth daily in the early morning  , Disp: , Rfl:     B Complex Vitamins (VITAMIN B COMPLEX PO), Take 1 capsule by mouth daily in the early morning  , Disp: , Rfl:     buPROPion (WELLBUTRIN SR) 100 mg 12 hr tablet, TAKE 1 TABLET BY [facility] TWICE DAILY, Disp: , Rfl: 0    Calcium Carbonate (CALTRATE 600 PO), Take 1 capsule by mouth daily in the early morning  , Disp: , Rfl:     Coenzyme Q10 (CO Q 10) 100 MG CAPS, Take 1 capsule by mouth daily at bedtime  , Disp: , Rfl:     liothyronine (CYTOMEL) 25 mcg tablet, Take 25 mcg by mouth daily in the early morning Taking half a tablet once daily  , Disp: , Rfl:     naproxen (NAPROSYN) 500 mg tablet, TAKE 1 TABLET BY MOUTH TWO TIMES A DAY WITH FOOD, Disp: , Rfl: 0    SYNTHROID 50 MCG tablet, Take 50 mcg by mouth daily in the early morning  , Disp: , Rfl: 2    clotrimazole-betamethasone (LOTRISONE) 1-0 05 % cream, , Disp: , Rfl:     lidocaine (LIDODERM) 5 %, APPLY TO AREA OF PAIN FOR UP TO 12 HOURS PER DAY  MAY CUT TO SIZE NEEDED AS DIRECTED, Disp: , Rfl: 0    lubiprostone (AMITIZA) 24 mcg capsule, Take 1 capsule (24 mcg total) by mouth 2 (two) times a day with meals (Patient not taking: Reported on 7/6/2020), Disp: 60 capsule, Rfl: 5    ALLERGIES:  No Known Allergies    REVIEW OF SYSTEMS:  Review of Systems   Constitutional: Negative for chills, fever and unexpected weight change  HENT: Negative for hearing loss, nosebleeds and sore throat  Eyes: Negative for pain, redness and visual disturbance  Respiratory: Negative for cough, shortness of breath and wheezing  Cardiovascular: Negative for chest pain, palpitations and leg swelling  Gastrointestinal: Negative for abdominal pain, nausea and vomiting  Endocrine: Negative for polydipsia and polyuria  Genitourinary: Negative for difficulty urinating and hematuria  Musculoskeletal: Negative for arthralgias, joint swelling and myalgias  Skin: Negative for rash and wound  Neurological: Positive for numbness  Negative for dizziness and headaches  Psychiatric/Behavioral: Negative for decreased concentration, dysphoric mood and suicidal ideas  The patient is not nervous/anxious          VITALS:  Vitals:    07/06/20 0937   BP: 116/77   Pulse: 56       LABS:  HgA1c: No results found for: HGBA1C  BMP:   Lab Results   Component Value Date    CALCIUM 8 9 11/27/2019    K 3 8 11/27/2019    CO2 31 11/27/2019     11/27/2019 BUN 15 11/27/2019    CREATININE 0 82 11/27/2019       _____________________________________________________  PHYSICAL EXAMINATION:  General: well developed and well nourished, alert, oriented times 3 and appears comfortable  Psychiatric: Normal  HEENT: Normocephalic, Atraumatic Trachea Midline, No torticollis  Pulmonary: No audible wheezing or respiratory distress   Cardiovascular: No pitting edema, 2+ radial pulse   Skin: No masses, erythema, lacerations, fluctation, ulcerations  Neurovascular: Sensation Intact to the Median, Ulnar, Radial Nerve, Motor Intact to the Median, Ulnar, Radial Nerve and Pulses Intact  Musculoskeletal: Normal, except as noted in detailed exam and in HPI  MUSCULOSKELETAL EXAMINATION:    Right Carpal Tunnel Exam:  Negative thenar atrophy  Negative phalen's test  Negative carpal tunnel compression  Positive tinels over median nerve at the wrist       Right CMC Exam:  No adduction contracture  No hyperextension deformity of MCP joint  Negative localized tenderness over radial and dorsal aspect of thumb (CMC joint)  Grind test is Negative for pain and Negative for crepitus  Metacarpal load shift test negative   No triggering or tenderness over the A1 pulley      Right Ulnar Nerve Exam:  Negative intrinsic atrophy  Negative  deformity at the elbow  Full range of motion with flexion and extension of the elbow  Full wrist flexion and extension supination pronation   Negative ulnar nerve compression test at the elbow  Positive tinels over the ulnar nerve at the elbow  Able to cross index and long fingers         ___________________________________________________  STUDIES REVIEWED:  I have personally reviewed AP lateral and oblique radiographs of right hand which demonstrates no acute fracture dislocation does have prominence 1st MP joint arthrosis          PROCEDURES PERFORMED:  Procedures  No Procedures performed today    _____________________________________________________      Sylvain Chen    I,:   Margarita Gibbons am acting as a scribe while in the presence of the attending physician :        I,:   Nano Peterson MD personally performed the services described in this documentation    as scribed in my presence :

## 2020-07-13 ENCOUNTER — EVALUATION (OUTPATIENT)
Dept: OCCUPATIONAL THERAPY | Facility: CLINIC | Age: 55
End: 2020-07-13
Payer: COMMERCIAL

## 2020-07-13 DIAGNOSIS — R20.2 NUMBNESS AND TINGLING IN RIGHT HAND: ICD-10-CM

## 2020-07-13 DIAGNOSIS — R20.0 NUMBNESS AND TINGLING IN RIGHT HAND: ICD-10-CM

## 2020-07-13 PROCEDURE — 97165 OT EVAL LOW COMPLEX 30 MIN: CPT | Performed by: OCCUPATIONAL THERAPIST

## 2020-07-13 PROCEDURE — 97110 THERAPEUTIC EXERCISES: CPT | Performed by: OCCUPATIONAL THERAPIST

## 2020-07-13 NOTE — PROGRESS NOTES
OT Evaluation     Today's date: 2020  Patient name: Betty Jensen  : 1965  MRN: [id number]  Referring provider: Anita Muniz MD  Dx:   Encounter Diagnosis     ICD-10-CM    1  Numbness and tingling in right hand R20 0 Ambulatory referral to PT/OT hand therapy    R20 2                   Assessment  Assessment details: Paramjit Velez presents with pain, weakness and sensory changes in her R hand across her ulnar and median distributions  She has a + tinels at her cubital and carpal tunnels and is + for neural tension at both sites  She would benefit from tx to address her deficits and nerve compression patterns    Goals  STG) Motion increased by 5% in 4-6 weeks  STG) Strength/Motor skills improved by 25% in 4-6 weeks  STG) Pain decreased by 25% in 4-6 weeks    LTG) ADL and IADL skills improved   LTG) Work skills improved  LTG) Leisure skills improved    Patient Goals: To stop having symptoms      Goals, plan of care and treatment condition discussed with patient  Patient expresses their understanding and questions regarding these isues were addressed  Subjective Evaluation    History of Present Illness  Mechanism of injury: Paramjit Velez has had several years of R CTs symptoms with an EMG performed at [facility] several years ago confirming the diagnosis  She has dealt with this for years but over the last 6 months she suffered a traumatic onset of R lateral epicondylitis, and now her pain radiates up past her lateral elbow to her medial deltoid  She states she wakes up several times a night due to the pain and her OTC orthotic does not help    Pain  Current pain rating: 3  At worst pain rating: 10    Hand dominance: right          Objective     Active Range of Motion     Left Wrist   Wrist flexion: 65 degrees   Wrist extension: 70 degrees     Right Wrist   Wrist flexion: 45 degrees   Wrist extension: 60 degrees   Radial deviation: 20 degrees   Ulnar deviation: 35 degrees     Right Thumb   Palmar Abduction    CMC: 60  Radial Abduction    CMC: 60    Right Digits   MCP Abduction     Index: 15    Ring: 10    Little: 25  MCP Adduction     Index: 0    Rin    Little: 0    Strength/Myotome Testing     Left Wrist/Hand      (2nd hand position)     Trial 1: 52 3    Thumb Strength  Key/Lateral Pinch     Trail 1: 12 3  Tip/Two-Point Pinch     Trial 1: 4 8    Right Wrist/Hand      (2nd hand position)     Trial 1: 34 1    Thumb Strength   Key/Lateral Pinch     Trial 1: 8 2  Tip/Two-Point Pinch     Trial 1: 2 2    Additional Strength Details  Op malik 4/5    Tests     Right Elbow   Positive Tinel's sign (cubital tunnel)  Right Wrist/Hand   Positive CMC shoulder sign, Froment's sign and Tinel's sign (medial nerve)  Negative Tinel's sign (radial tunnel)       Additional Tests Details  Monofilaments:    Index Radial R Score: 3 61  Index Ulnar R 3 61    Long Radial R 3 61    Long Ulnar R 3 61    Ring Radial R 3 61   Ring Ulnar R 3 61    Small Radial R 3 61    Small Ulnar R 3 61    Thumb Radial R 3 61   Thumb Ulnar R 3 61   Palm Thenar R 3 61       Palm Hypothenar R 3 61                  Precautions: Universal  HEP: MNG, [place]LL[place], Towel block      Manuals             TPR             IASTM CT            MNG, WELL[place]                          Neuro Re-Ed             Digit ab/ad             opp roll                                                                              Ther Ex                                                                                                                     Ther Activity                                       Gait Training                                       Modalities

## 2020-07-15 ENCOUNTER — OFFICE VISIT (OUTPATIENT)
Dept: OCCUPATIONAL THERAPY | Facility: CLINIC | Age: 55
End: 2020-07-15
Payer: COMMERCIAL

## 2020-07-15 DIAGNOSIS — R20.2 NUMBNESS AND TINGLING IN RIGHT HAND: Primary | ICD-10-CM

## 2020-07-15 DIAGNOSIS — R20.0 NUMBNESS AND TINGLING IN RIGHT HAND: Primary | ICD-10-CM

## 2020-07-15 PROCEDURE — 97110 THERAPEUTIC EXERCISES: CPT | Performed by: OCCUPATIONAL THERAPIST

## 2020-07-15 PROCEDURE — 97140 MANUAL THERAPY 1/> REGIONS: CPT | Performed by: OCCUPATIONAL THERAPIST

## 2020-07-15 NOTE — PROGRESS NOTES
Daily Note     Today's date: 7/15/2020  Patient name: Sage Mehta  : 1965  MRN: [id number]  Referring provider: Yazmin Denise MD  Dx:   Encounter Diagnosis     ICD-10-CM    1  Numbness and tingling in right hand R20 0     R20 2                   Subjective: "I was able to sleep through the night"      Objective: See treatment diary below      Assessment: Pt reports much improved symptoms today  Much less symptomatic with nerve glides  Reports towel block for elbow well received  Plan: Continue per plan of care        Precautions: Universal  HEP: MNG, [place], Towel block      Manuals 7/15            TPR             IASTM             MNG, [place] 10            KT 5            Neuro Re-Ed             Digit ab/ad             opp roll                                                                              Ther Ex             Positioning/orthotic and KT application edu 10                                                                                                       Ther Activity                                       Gait Training                                       Modalities

## 2020-07-16 ENCOUNTER — HOSPITAL ENCOUNTER (OUTPATIENT)
Dept: CT IMAGING | Facility: HOSPITAL | Age: 55
Discharge: HOME | End: 2020-07-16
Payer: COMMERCIAL

## 2020-07-16 DIAGNOSIS — K59.04 CHRONIC IDIOPATHIC CONSTIPATION: ICD-10-CM

## 2020-07-16 DIAGNOSIS — R10.32 LEFT LOWER QUADRANT ABDOMINAL PAIN: ICD-10-CM

## 2020-07-16 PROCEDURE — 74177 CT ABD & PELVIS W/CONTRAST: CPT

## 2020-07-16 RX ADMIN — IOHEXOL 100 ML: 350 INJECTION, SOLUTION INTRAVENOUS at 07:37

## 2020-07-21 ENCOUNTER — OFFICE VISIT (OUTPATIENT)
Dept: OCCUPATIONAL THERAPY | Facility: CLINIC | Age: 55
End: 2020-07-21
Payer: COMMERCIAL

## 2020-07-21 DIAGNOSIS — R20.2 NUMBNESS AND TINGLING IN RIGHT HAND: Primary | ICD-10-CM

## 2020-07-21 DIAGNOSIS — R20.0 NUMBNESS AND TINGLING IN RIGHT HAND: Primary | ICD-10-CM

## 2020-07-21 PROCEDURE — 97140 MANUAL THERAPY 1/> REGIONS: CPT | Performed by: OCCUPATIONAL THERAPIST

## 2020-07-21 NOTE — PROGRESS NOTES
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  Very tight pronator teres  Plan: Continue per plan of care        Precautions: Universal  HEP: MNG, [place], Towel block      Manuals 7/15 7/21           TPR             IASTM  10           MNG, [place] 10 10           Cuppping  5           KT 5 5           Neuro Re-Ed             Digit ab/ad             opp roll             Pinch [place]                                                                 Ther Ex             Positioning/orthotic and KT application edu 10                                                                                                       Ther Activity                                       Gait Training                                       Modalities

## 2020-07-23 ENCOUNTER — OFFICE VISIT (OUTPATIENT)
Dept: OCCUPATIONAL THERAPY | Facility: CLINIC | Age: 55
End: 2020-07-23
Payer: COMMERCIAL

## 2020-07-23 DIAGNOSIS — R20.2 NUMBNESS AND TINGLING IN RIGHT HAND: Primary | ICD-10-CM

## 2020-07-23 DIAGNOSIS — R20.0 NUMBNESS AND TINGLING IN RIGHT HAND: Primary | ICD-10-CM

## 2020-07-23 PROCEDURE — 97110 THERAPEUTIC EXERCISES: CPT | Performed by: OCCUPATIONAL THERAPIST

## 2020-07-23 PROCEDURE — 97010 HOT OR COLD PACKS THERAPY: CPT | Performed by: OCCUPATIONAL THERAPIST

## 2020-07-23 PROCEDURE — 97140 MANUAL THERAPY 1/> REGIONS: CPT | Performed by: OCCUPATIONAL THERAPIST

## 2020-07-23 NOTE — PROGRESS NOTES
Daily Note     Today's date: 2020  Patient name: Ellis Estrella  : 1965  MRN: [id number]  Referring provider: Samual Goodpasture, MD  Dx:   Encounter Diagnosis     ICD-10-CM    1  Numbness and tingling in right hand R20 0     R20 2                   Subjective: "It's still pretty sore"      Objective: See treatment diary below      Assessment: Tolerated fair, still symptomatic with nerve glides, Starting proximal posture training  KT for median nerve  Plan: Continue per plan of care        Precautions: Universal  HEP: MNG, [place], Towel block      Manuals 7/15 7/21 7/23          TPR             IASTM  10 10          MNG, [place] 10 10 10          Cuppping  5 5          KT 5 5 5          Neuro Re-Ed             Digit ab/ad             opp roll             Pinch [place]             Ext rot sld             No money   RTB 2x 10          scap add   RTB 3x 10                       Ther Ex             Positioning/orthotic and KT application edu 10                                                                                                       Ther Activity                                       Gait Training                                       Modalities             MHP   10

## 2020-07-28 ENCOUNTER — OFFICE VISIT (OUTPATIENT)
Dept: OCCUPATIONAL THERAPY | Facility: CLINIC | Age: 55
End: 2020-07-28
Payer: COMMERCIAL

## 2020-07-28 DIAGNOSIS — R20.0 NUMBNESS AND TINGLING IN RIGHT HAND: Primary | ICD-10-CM

## 2020-07-28 DIAGNOSIS — R20.2 NUMBNESS AND TINGLING IN RIGHT HAND: Primary | ICD-10-CM

## 2020-07-28 PROCEDURE — 97140 MANUAL THERAPY 1/> REGIONS: CPT | Performed by: OCCUPATIONAL THERAPIST

## 2020-07-28 PROCEDURE — 97112 NEUROMUSCULAR REEDUCATION: CPT | Performed by: OCCUPATIONAL THERAPIST

## 2020-07-28 NOTE — PROGRESS NOTES
Daily Note     Today's date: 2020  Patient name: Tony Marquez  : 1965  MRN: [id number]  Referring provider: Natalee Dozier MD  Dx:   Encounter Diagnosis     ICD-10-CM    1  Numbness and tingling in right hand R20 0     R20 2                   Subjective: "It still goes numb"      Objective: See treatment diary below      Assessment: States her symptoms are still improved but she is still having N/T and some pain  Adding more posture retraining PREs    Per patients activity recounts, it still seems that she is overworking herself and not avoiding nerve pressure contact and overtensioning - reiterated need to avoid pressure on CT/forearms and flexion positioning  Plan: Continue per plan of care        Precautions: Universal  HEP: MNG, [place], Towel block      Manuals 7/15 7/21 7/23 7/28         TPR             IASTM  10 10 15         MNG, [place] 10 10 10 10         Cuppping  5 5 10 moving, volar wrist CT         KT 5 5 5 5         Neuro Re-Ed             Digit ab/ad             opp roll             Pinch [place]                          No money   RTB 2x 10 RTB 3x 10         scap add   RTB 3x 10 RTB 3x 10                      Ther Ex             Positioning/orthotic and KT application edu 10            Corner stretch    Added to HEP                                                                                       Ther Activity                                       Gait Training                                       Modalities             MHP   10 5

## 2020-07-29 ENCOUNTER — COSMETIC (OUTPATIENT)
Dept: PLASTIC SURGERY | Facility: HOSPITAL | Age: 55
End: 2020-07-29

## 2020-07-29 VITALS
HEART RATE: 63 BPM | BODY MASS INDEX: 21.58 KG/M2 | RESPIRATION RATE: 16 BRPM | HEIGHT: 63 IN | SYSTOLIC BLOOD PRESSURE: 133 MMHG | WEIGHT: 121.8 LBS | TEMPERATURE: 97.9 F | DIASTOLIC BLOOD PRESSURE: 78 MMHG

## 2020-07-29 DIAGNOSIS — L90.5 SCAR: Primary | ICD-10-CM

## 2020-07-29 PROCEDURE — 3008F BODY MASS INDEX DOCD: CPT | Performed by: SURGERY

## 2020-07-29 PROCEDURE — COSCON: Performed by: SURGERY

## 2020-07-29 NOTE — PROGRESS NOTES
Assessment/Plan:  Please see HPI  Nearly 1-1/2 years ago she underwent submental repair, this is a linear closure with prominence at either end, most noticeable on her left, she is enquiring if there are surgical options to correct this  I explained to her that we could essentially extend the incision to incorporate the areas of concern, however the resultant scar might be noticeable  She agrees that extending the scar may be more concerning to her than the current appearance  We have mutually agreed to defer surgical intervention at this point, and she will incorporate digital massage several times daily to see if we can minimize the prominence this  I suggested that she schedule appointment to return in perhaps 4-6 weeks  There are no diagnoses linked to this encounter  Subjective:  Scar of jaw line/prominence     Patient ID: Leonette Scheuermann is a 48 y o  female  HPI Funmi Gupta is a very nice lady who presents today to discuss options for correction of prominences at either end of the submental scar, she attributes this to undergoing repair approximately 1-1/2 years ago  The following portions of the patient's history were reviewed and updated as appropriate: allergies, current medications, past family history, past medical history, past social history, past surgical history and problem list     Review of Systems   Constitutional: Negative for chills and fever  HENT: Negative for hearing loss  Eyes: Positive for visual disturbance  Negative for discharge  Respiratory: Negative for chest tightness and shortness of breath  Cardiovascular: Negative for chest pain and leg swelling  Gastrointestinal: Negative for blood in stool, constipation, diarrhea and nausea  Genitourinary: Negative for dysuria  Musculoskeletal: Negative for gait problem  Skin: Negative for rash  Allergic/Immunologic: Negative for immunocompromised state     Neurological: Negative for seizures and headaches  Hematological: Does not bruise/bleed easily  Psychiatric/Behavioral: Negative for dysphoric mood  The patient is not nervous/anxious  Objective:      /78   Pulse 63   Temp 97 9 °F (36 6 °C) (Temporal)   Resp 16   Ht 5' 3" (1 6 m)   Wt 55 2 kg (121 lb 12 8 oz)   BMI 21 58 kg/m²          Physical Exam   Constitutional: She is oriented to person, place, and time  She appears well-developed and well-nourished  HENT:   Head: Normocephalic and atraumatic  Linear submental scar with dog ear prominence bilaterally, slightly more noticeable on left than right   Eyes: Pupils are equal, round, and reactive to light  Neck: Normal range of motion  Pulmonary/Chest: Effort normal    Musculoskeletal: Normal range of motion  Neurological: She is alert and oriented to person, place, and time  Skin: Skin is warm  Psychiatric: She has a normal mood and affect

## 2020-07-30 ENCOUNTER — OFFICE VISIT (OUTPATIENT)
Dept: OCCUPATIONAL THERAPY | Facility: CLINIC | Age: 55
End: 2020-07-30
Payer: COMMERCIAL

## 2020-07-30 DIAGNOSIS — R20.0 NUMBNESS AND TINGLING IN RIGHT HAND: Primary | ICD-10-CM

## 2020-07-30 DIAGNOSIS — R20.2 NUMBNESS AND TINGLING IN RIGHT HAND: Primary | ICD-10-CM

## 2020-07-30 PROCEDURE — 97140 MANUAL THERAPY 1/> REGIONS: CPT | Performed by: OCCUPATIONAL THERAPIST

## 2020-07-30 PROCEDURE — 97110 THERAPEUTIC EXERCISES: CPT | Performed by: OCCUPATIONAL THERAPIST

## 2020-07-30 NOTE — PROGRESS NOTES
Daily Note     Today's date: 2020  Patient name: Jesus Rincon  : 1965  MRN: [id number]  Referring provider: Kylie Castellon MD  Dx:   Encounter Diagnosis     ICD-10-CM    1  Numbness and tingling in right hand R20 0     R20 2                   Subjective: "I adjusted the brace, it's better"      Objective: See treatment diary below      Assessment: + spurlings and more significant neural tension with cervical component, will monitor for potential double crush  Plan: Continue per plan of care        Precautions: Universal  HEP: MNG, [place], Towel block, chin tuck      Manuals 7/15 7/21 7/23 7/28 7/30        TPR             IASTM  10 10 15 10        MNG, [place] 10 10 10 10 10        Cuppping  5 5 10 moving, volar wrist CT 10 CT and moving volar FA        KT 5 5 5 5 5        Neuro Re-Ed             Digit ab/ad             opp roll             Pinch [place]                          No money   RTB 2x 10 RTB 3x 10 BTB 3x 10        scap add   RTB 3x 10 RTB 3x 10 BTB 3x 10        Chin tucks     1x10        Ther Ex             Positioning/orthotic and KT application edu 10            Corner stretch    Added to HEP                                                                                       Ther Activity                                       Gait Training                                       Modalities             MHP   10 5 5

## 2020-07-31 ENCOUNTER — TELEPHONE (OUTPATIENT)
Dept: GASTROENTEROLOGY | Facility: CLINIC | Age: 55
End: 2020-07-31

## 2020-07-31 NOTE — TELEPHONE ENCOUNTER
----- Message from Pastor Douglas PA-C sent at 7/20/2020  3:04 PM EDT -----  CT scan normal aside from moderate amount of stool in the colon   Follow up with dr Jaycee Patel scheduled for 8/11 and can further discuss next steps

## 2020-08-03 ENCOUNTER — OFFICE VISIT (OUTPATIENT)
Dept: OCCUPATIONAL THERAPY | Facility: CLINIC | Age: 55
End: 2020-08-03
Payer: COMMERCIAL

## 2020-08-03 DIAGNOSIS — R20.2 NUMBNESS AND TINGLING IN RIGHT HAND: Primary | ICD-10-CM

## 2020-08-03 DIAGNOSIS — R20.0 NUMBNESS AND TINGLING IN RIGHT HAND: Primary | ICD-10-CM

## 2020-08-03 PROCEDURE — 97140 MANUAL THERAPY 1/> REGIONS: CPT | Performed by: OCCUPATIONAL THERAPIST

## 2020-08-03 NOTE — PROGRESS NOTES
Daily Note     Today's date: 8/3/2020  Patient name: Neri Ordonez  : 1965  MRN: [id number]  Referring provider: Sarahy Cottrell MD  Dx:   Encounter Diagnosis     ICD-10-CM    1  Numbness and tingling in right hand  R20 0     R20 2                   Subjective: "It wakes me up about 2 times a night"      Objective: See treatment diary below      Assessment: Ulnar gliding has improved, and seems to be less irritable, however she still frequently complains of her hand going numb when doing her hair or makeup  Unsure if this is a result of prolongued elbow flexion or if she is predominately in wrist flexion and is unaware  She does report quick recovery with relaxing in elbow extension  Plan: Continue per plan of care        Precautions: Universal  HEP: MNG, [place], Towel block, chin tuck      Manuals 7/15 7/21 7/23 7/28 7/30 8/3       TPR      15       IASTM  10 10 15 10 10       MNG, [place] 10 10 10 10 10 15       Cuppping  5 5 10 moving, volar wrist CT 10 CT and moving volar FA        KT 5 5 5 5 5 5       Neuro Re-Ed             Digit ab/ad             opp roll             Pinch [place]                          No money   RTB 2x 10 RTB 3x 10 BTB 3x 10        scap add   RTB 3x 10 RTB 3x 10 BTB 3x 10        Chin tucks     1x10 [id number] 5 10 sec hold       Ther Ex             Positioning/orthotic and KT application edu 10            Corner stretch    Added to HEP                                                                                       Ther Activity                                       Gait Training                                       Modalities             MHP   10 5 5 5

## 2020-08-05 ENCOUNTER — APPOINTMENT (OUTPATIENT)
Dept: OCCUPATIONAL THERAPY | Facility: CLINIC | Age: 55
End: 2020-08-05
Payer: COMMERCIAL

## 2020-08-10 ENCOUNTER — OFFICE VISIT (OUTPATIENT)
Dept: OCCUPATIONAL THERAPY | Facility: CLINIC | Age: 55
End: 2020-08-10
Payer: COMMERCIAL

## 2020-08-10 DIAGNOSIS — R20.2 NUMBNESS AND TINGLING IN RIGHT HAND: Primary | ICD-10-CM

## 2020-08-10 DIAGNOSIS — R20.0 NUMBNESS AND TINGLING IN RIGHT HAND: Primary | ICD-10-CM

## 2020-08-10 PROCEDURE — 97140 MANUAL THERAPY 1/> REGIONS: CPT | Performed by: OCCUPATIONAL THERAPIST

## 2020-08-10 PROCEDURE — 97110 THERAPEUTIC EXERCISES: CPT | Performed by: OCCUPATIONAL THERAPIST

## 2020-08-10 NOTE — PROGRESS NOTES
Daily Note     Today's date: 8/10/2020  Patient name: Carry Night  : 1965  MRN: [id number]  Referring provider: Katelynn Stewart MD  Dx:   Encounter Diagnosis     ICD-10-CM    1  Numbness and tingling in right hand  R20 0     R20 2                   Subjective: "It stops being numb faster"      Objective: See treatment diary below      Assessment: Overall reports no pain at night and faster recovery when her hand starts to go numb  Will continue with current POC         Plan: Continue per plan of care        Precautions: Universal  HEP: MNG, WELLINGTON, Towel block, chin tuck      Manuals 7/15 7/21 7/23 7/28 7/30 8/3 8/10      TPR      15 5      IASTM  10 10 15 10 10 5      MNG, [place]LL[place] 10 10 10 10 10 15 10      Cuppping  5 5 10 moving, volar wrist CT 10 CT and moving volar FA  10      KT 5 5 5 5 5 5 5      Neuro Re-Ed             Digit ab/ad             opp roll             Pinch [place]                          No money   RTB 2x 10 RTB 3x 10 BTB 3x 10  GTB 3x 10      scap add   RTB 3x 10 RTB 3x 10 BTB 3x 10  GTB 3x 10      Chin tucks     1x10 [id number] 5 10 sec hold 2x 10 5 sec hold      Ther Ex             Positioning/orthotic and KT application edu 10            Corner stretch    Added to HEP                                                                                       Ther Activity                                       Gait Training                                       Modalities             MHP   10 5 5 5 5

## 2020-08-11 ENCOUNTER — OFFICE VISIT (OUTPATIENT)
Dept: GASTROENTEROLOGY | Facility: AMBULARY SURGERY CENTER | Age: 55
End: 2020-08-11
Payer: COMMERCIAL

## 2020-08-11 VITALS
RESPIRATION RATE: 18 BRPM | BODY MASS INDEX: 20.73 KG/M2 | HEART RATE: 56 BPM | TEMPERATURE: 97.6 F | WEIGHT: 121.4 LBS | HEIGHT: 64 IN | SYSTOLIC BLOOD PRESSURE: 104 MMHG | DIASTOLIC BLOOD PRESSURE: 76 MMHG

## 2020-08-11 DIAGNOSIS — K59.00 CONSTIPATION, UNSPECIFIED CONSTIPATION TYPE: Primary | ICD-10-CM

## 2020-08-11 PROCEDURE — 99214 OFFICE O/P EST MOD 30 MIN: CPT | Performed by: INTERNAL MEDICINE

## 2020-08-11 PROCEDURE — 1036F TOBACCO NON-USER: CPT | Performed by: INTERNAL MEDICINE

## 2020-08-11 PROCEDURE — 3008F BODY MASS INDEX DOCD: CPT | Performed by: INTERNAL MEDICINE

## 2020-08-11 NOTE — LETTER
August 11, 2020     Referral [facility]    Patient: Melinda Salmeron   YOB: 1965   Date of Visit: 8/11/2020       Dear Dr Mg Salcedo:    Thank you for referring Melinda Salmeron to me for evaluation  Below are my notes for this consultation  If you have questions, please do not hesitate to call me  I look forward to following your patient along with you  Sincerely,        Dulce Johnson MD        CC: MD Dulce Gonzalez MD  8/11/2020 12:21 PM  Sign when Signing Visit  [facility]  Progress Note - Melinda Salmeron 48 y o  female MRN: [id number]    Unit/Bed#:  Encounter: [id number]    Assessment/Plan:    1  Constipation-possibly IBS C versus slow transit constipation-no improvement with Linzess, Amitiza or Trulance  TSH is unremarkable, CBC was normal   Celiac serologies were normal   -check magnesium levels, check CMP  -colonoscopy without any obstructing lesions  -patient is on Wellbutrin, this may be worsening the symptoms but also reports symptoms preceded the use of this   -will give samples for prucalopride 2 mg daily, discussed with patient regarding regarding side effects including headache, diarrhea, nausea  Also discussed with patient that should she have any changes in behavior or suicide ideation, she is to immediately stop the use and call us  She understands and agrees with the plan  Subjective: This is a 81-year-old female with history of depression well controlled on Wellbutrin, prior abdominal adhesion surgery, IBS C who presents for follow-up  Patient has previously failed trulance, Linzess, MiraLax  She recently underwent CT scan which was notable for significant stool burden consistent with constipation  Patient reports that she does have bowel movement every day but they are small pebble like  She feels incomplete evacuation    She is on Wellbutrin but the symptoms preceded the use of Wellbutrin  She reportedly underwent colonoscopy in November 2018 which was notable for long tortuous colon and small sub cm hyperplastic polyp  She was recommended a repeat colonoscopy at 5 year interval   No family history of colon cancer  No other abdominal pain or weight loss unintentionally  She underwent colonoscopy and EGD in 2009 as well  EGD at that time was normal but she did have a H pylori organism that was treated in 2009  Patient does not have any symptoms of dyspepsia at this time  Objective:     Vitals: Blood pressure 104/76, pulse 56, temperature 97 6 °F (36 4 °C), temperature source Tympanic, resp  rate 18, height 5' 4" (1 626 m), weight 55 1 kg (121 lb 6 4 oz)  ,Body mass index is 20 84 kg/m²  [unfilled]    Physical Exam:    GEN: wn/wd, NAD  HEENT: MMM, no cervical or supraclavicular LAD, anciteric  CV: RRR, no m/r/g  CHEST: CTA b/l, no w/r/r  ABD: +BS, soft, NT/ND, no hepatosplenomegaly  EXT: no c/c/e  SKIN: no rashes  NEURO: aaox3      Invasive Devices     None                         Lab, Imaging and other studies:     No visits with results within 1 Day(s) from this visit  Latest known visit with results is:   Orders Only on 04/21/2020   Component Date Value    SARS-CoV-2  04/21/2020 Not Detected          I have personally reviewed pertinent films in PACS    No current facility-administered medications for this visit

## 2020-08-11 NOTE — PROGRESS NOTES
[facility]  Progress Note - Ailyn Galaviz 48 y o  female MRN: [id number]    Unit/Bed#:  Encounter: [id number]    Assessment/Plan:    1  Constipation-possibly IBS C versus slow transit constipation-no improvement with Linzess, Amitiza or Trulance  TSH is unremarkable, CBC was normal   Celiac serologies were normal   -check magnesium levels, check CMP  -colonoscopy without any obstructing lesions  -patient is on Wellbutrin, this may be worsening the symptoms but also reports symptoms preceded the use of this   -will give samples for prucalopride 2 mg daily, discussed with patient regarding regarding side effects including headache, diarrhea, nausea  Also discussed with patient that should she have any changes in behavior or suicide ideation, she is to immediately stop the use and call us  She understands and agrees with the plan  Subjective: This is a 46-year-old female with history of depression well controlled on Wellbutrin, prior abdominal adhesion surgery, IBS C who presents for follow-up  Patient has previously failed trulance, Linzess, MiraLax  She recently underwent CT scan which was notable for significant stool burden consistent with constipation  Patient reports that she does have bowel movement every day but they are small pebble like  She feels incomplete evacuation  She is on Wellbutrin but the symptoms preceded the use of Wellbutrin  She reportedly underwent colonoscopy in November 2018 which was notable for long tortuous colon and small sub cm hyperplastic polyp  She was recommended a repeat colonoscopy at 5 year interval   No family history of colon cancer  No other abdominal pain or weight loss unintentionally  She underwent colonoscopy and EGD in 2009 as well  EGD at that time was normal but she did have a H pylori organism that was treated in 2009  Patient does not have any symptoms of dyspepsia at this time        Objective:     Vitals: Blood pressure 104/76, pulse 56, temperature 97 6 °F (36 4 °C), temperature source Tympanic, resp  rate 18, height 5' 4" (1 626 m), weight 55 1 kg (121 lb 6 4 oz)  ,Body mass index is 20 84 kg/m²  [unfilled]    Physical Exam:    GEN: wn/wd, NAD  HEENT: MMM, no cervical or supraclavicular LAD, anciteric  CV: RRR, no m/r/g  CHEST: CTA b/l, no w/r/r  ABD: +BS, soft, NT/ND, no hepatosplenomegaly  EXT: no c/c/e  SKIN: no rashes  NEURO: aaox3      Invasive Devices     None                         Lab, Imaging and other studies:     No visits with results within 1 Day(s) from this visit  Latest known visit with results is:   Orders Only on 04/21/2020   Component Date Value    SARS-CoV-2  04/21/2020 Not Detected          I have personally reviewed pertinent films in PACS    No current facility-administered medications for this visit

## 2020-08-12 ENCOUNTER — APPOINTMENT (OUTPATIENT)
Dept: OCCUPATIONAL THERAPY | Facility: CLINIC | Age: 55
End: 2020-08-12
Payer: COMMERCIAL

## 2020-08-12 NOTE — PROGRESS NOTES
Daily Note     Today's date: 2020  Patient name: Sage Mehta  : 1965  MRN: [id number]  Referring provider: Yazmin Denise MD  Dx:   Encounter Diagnosis     ICD-10-CM    1  Numbness and tingling in right hand  R20 0     R20 2                   Subjective: ***      Objective: See treatment diary below      Assessment: Tolerated treatment {Tolerated treatment :}   Patient {assessment:}      Plan: {PLAN:[id number]}     Precautions: Universal  HEP: MNG, WELLINGTON, Towel block, chin tuck      Manuals 7/15 7/21 7/23 7/28 7/30 8/3 8/10      TPR      15 5      IASTM  10 10 15 10 10 5      MNG, [place]LL[place] 10 10 10 10 10 15 10      Cuppping  5 5 10 moving, volar wrist CT 10 CT and moving volar FA  10      KT 5 5 5 5 5 5 5      Neuro Re-Ed             Digit ab/ad             opp roll             Pinch [place]                          No money   RTB 2x 10 RTB 3x 10 BTB 3x 10  GTB 3x 10      scap add   RTB 3x 10 RTB 3x 10 BTB 3x 10  GTB 3x 10      Chin tucks     1x10 [id number] 5 10 sec hold 2x 10 5 sec hold      Ther Ex             Positioning/orthotic and KT application edu 10            Corner stretch    Added to HEP                                                                                       Ther Activity                                       Gait Training                                       Modalities             MHP   10 5 5 5 5

## 2020-08-17 ENCOUNTER — APPOINTMENT (OUTPATIENT)
Dept: OCCUPATIONAL THERAPY | Facility: CLINIC | Age: 55
End: 2020-08-17
Payer: COMMERCIAL

## 2020-08-17 ENCOUNTER — TELEPHONE (OUTPATIENT)
Dept: GASTROENTEROLOGY | Facility: AMBULARY SURGERY CENTER | Age: 55
End: 2020-08-17

## 2020-08-17 NOTE — TELEPHONE ENCOUNTER
Patients GI provider:  Dr Kristen Haque    Number to return call: [phone]    Reason for call: Pt calling stated Dr Denis Lazcano gave her a sample of Motegrity but has side effect (fogginess and emotional and clinical depression) would like to speak with someone       Scheduled procedure/appointment date if applicable: Apt/procedure n/a

## 2020-08-18 NOTE — TELEPHONE ENCOUNTER
Patient has hx of IBS-C , failed amitiza, trulance, linzess , s/p office visit August 11  Motegrity 2 mg was prescribed  Due for next colonoscopy 2023, CT July 16 showed moderate volume stool, no obstruction, TSH normal     She called to report feeling like she was in a "dark well" after taking motegrity x 3 days and has since stopped; also said it did not provide relief from constipation  Denies abdominal pain, having daily bowel "pebble" bowel movements as prior  Discussed managing symptoms through FODMAP diet, avoiding trigger foods, and taking OTC colace daily up to twice daily if needed  Patient does not prefer to try other medications at this time  I also recommended she increase fluid intake  She would like to try "smooth moves" tea and would like input on usage  Label says is a laxative, senna based tea, 1 cup daily for short term use  Please provide input   Thank you

## 2020-08-18 NOTE — TELEPHONE ENCOUNTER
Patient aware of recommendation, also aware of label on tea on this communication thread, although not FDA approved

## 2020-08-18 NOTE — TELEPHONE ENCOUNTER
She could try this  I would also suggest at least taking Colace BID along with that  Thank you, Huseyin Diaz!

## 2020-08-19 ENCOUNTER — OFFICE VISIT (OUTPATIENT)
Dept: OBGYN CLINIC | Facility: CLINIC | Age: 55
End: 2020-08-19
Payer: COMMERCIAL

## 2020-08-19 VITALS
SYSTOLIC BLOOD PRESSURE: 122 MMHG | HEIGHT: 64 IN | DIASTOLIC BLOOD PRESSURE: 66 MMHG | HEART RATE: 59 BPM | BODY MASS INDEX: 20.62 KG/M2 | WEIGHT: 120.8 LBS

## 2020-08-19 DIAGNOSIS — R20.0 NUMBNESS AND TINGLING IN RIGHT HAND: Primary | ICD-10-CM

## 2020-08-19 DIAGNOSIS — R20.2 NUMBNESS AND TINGLING IN RIGHT HAND: Primary | ICD-10-CM

## 2020-08-19 PROCEDURE — 1036F TOBACCO NON-USER: CPT | Performed by: SURGERY

## 2020-08-19 PROCEDURE — 3008F BODY MASS INDEX DOCD: CPT | Performed by: SURGERY

## 2020-08-19 PROCEDURE — 99213 OFFICE O/P EST LOW 20 MIN: CPT | Performed by: SURGERY

## 2020-08-19 NOTE — PROGRESS NOTES
ASSESSMENT/PLAN:      48 y o  female with right hand numbness and tingling with moderate discomfort, likely carpal tunnel syndrome  She was advised to continue with nighttime bracing  A EMG was ordered today  She will continue with her OT HEP  I will see her back in the office once the EMG is complete to discuss further treatment options  The patient verbalized understanding of exam findings and treatment plan  We engaged in the shared decision-making process and treatment options were discussed at length with the patient  Surgical and conservative management discussed today along with risks and benefits  Diagnoses and all orders for this visit:    Numbness and tingling in right hand  -     EMG 2 Limb Upper Extremity; Future      Follow Up:  Return after EMG  To Do Next Visit:  Re-evaluation of current issue    ____________________________________________________________________________________________________________________________________________      CHIEF COMPLAINT:  Chief Complaint   Patient presents with    Right Hand - Follow-up       SUBJECTIVE:  Magdalena Desouza is a 48y o  year old RHD female who presents to the office today for a follow up regarding right hand numbness and tingling  Dominic Paul has been wearing a cock up wrist brace at night  She has been attending OT  She also completed a medrol dosepak and has transitioned to Mobic once a day  Swetha notes mild improvement with regards to nighttime bracing  She notes that the elbow stabilizer does cause increased pain  If she is active during the day she may wake up at night once with regards to numbness and tingling  If she I snot active she usually does not wake up from sleep at night  She notes no real improvement in her symptoms with regards to the oral steroid  She notes her right hand numbness and tingling is worse at night  She notes that numbness and tingling is to all of her digits, except her small finger          I have personally reviewed all the relevant PMH, PSH, SH, FH, Medications and allergies  PAST MEDICAL HISTORY:  Past Medical History:   Diagnosis Date    Depression     Disease of thyroid gland     Heart palpitations     Hypothyroidism     PONV (postoperative nausea and vomiting)        PAST SURGICAL HISTORY:  Past Surgical History:   Procedure Laterality Date    ABDOMINAL ADHESION SURGERY      [address]  [address], 1991, 2000    HYSTERECTOMY  2006    partial, still have ovaries     OTHER SURGICAL HISTORY      abdominal lysis with a band put in at age 44    DE EXCIS INTERDIGITAL [place] Left 2/13/2019    Procedure: EXCISION Praneeth Feng;  Surgeon: Freddy Ramos MD;  Location: [facility];  Service: Orthopedics       FAMILY HISTORY:  Family History   Problem Relation Age of Onset    Hypertension Father     Prostate cancer Brother     Stroke Maternal Grandfather     Prostate cancer Other         runs strongly on father's side of family        SOCIAL HISTORY:  Social History     Tobacco Use    Smoking status: Never Smoker    Smokeless tobacco: Never Used   Substance Use Topics    Alcohol use:  Yes     Alcohol/week: 1 0 - 2 0 standard drinks     Types: 1 - 2 Glasses of wine per week     Comment: socially    Drug use: No       MEDICATIONS:    Current Outpatient Medications:     Ascorbic Acid (VITAMIN C) 500 MG CAPS, Take 1 capsule by mouth daily in the early morning  , Disp: , Rfl:     B Complex Vitamins (VITAMIN B COMPLEX PO), Take 1 capsule by mouth daily in the early morning  , Disp: , Rfl:     buPROPion (WELLBUTRIN SR) 100 mg 12 hr tablet, TAKE 1 TABLET BY [facility] TWICE DAILY, Disp: , Rfl: 0    Calcium Carbonate (CALTRATE 600 PO), Take 1 capsule by mouth daily in the early morning  , Disp: , Rfl:     clotrimazole-betamethasone (LOTRISONE) 1-0 05 % cream, , Disp: , Rfl:     Coenzyme Q10 (CO Q 10) 100 MG CAPS, Take 1 capsule by mouth daily at bedtime  , Disp: , Rfl:     lidocaine (LIDODERM) 5 %, APPLY TO AREA OF PAIN FOR UP TO 12 HOURS PER DAY  MAY CUT TO SIZE NEEDED AS DIRECTED, Disp: , Rfl: 0    liothyronine (CYTOMEL) 25 mcg tablet, Take 25 mcg by mouth daily in the early morning Taking half a tablet once daily  , Disp: , Rfl:     SYNTHROID 50 MCG tablet, Take 50 mcg by mouth daily in the early morning  , Disp: , Rfl: 2    ALPRAZolam (XANAX) 0 25 mg tablet, Take by mouth as needed for anxiety For Flying only , Disp: , Rfl:     lubiprostone (AMITIZA) 24 mcg capsule, Take 1 capsule (24 mcg total) by mouth 2 (two) times a day with meals (Patient not taking: Reported on 7/6/2020), Disp: 60 capsule, Rfl: 5    meloxicam (MOBIC) 7 5 mg tablet, Take 2 tablets (15 mg total) by mouth daily (Patient not taking: Reported on 8/11/2020), Disp: 30 tablet, Rfl: 1    methylPREDNISolone 4 MG tablet therapy pack, Use as directed on package (Patient not taking: Reported on 8/11/2020), Disp: 1 each, Rfl: 0    naproxen (NAPROSYN) 500 mg tablet, TAKE 1 TABLET BY MOUTH TWO TIMES A DAY WITH FOOD, Disp: , Rfl: 0    ALLERGIES:  No Known Allergies    REVIEW OF SYSTEMS:  Review of Systems   Constitutional: Negative for chills, fever and unexpected weight change  HENT: Negative for hearing loss, nosebleeds and sore throat  Eyes: Negative for pain, redness and visual disturbance  Respiratory: Negative for cough, shortness of breath and wheezing  Cardiovascular: Negative for chest pain, palpitations and leg swelling  Gastrointestinal: Negative for abdominal pain, nausea and vomiting  Endocrine: Negative for polydipsia and polyuria  Genitourinary: Negative for difficulty urinating and hematuria  Musculoskeletal: Negative for arthralgias, joint swelling and myalgias  Skin: Negative for rash and wound  Neurological: Positive for numbness  Negative for dizziness and headaches  Psychiatric/Behavioral: Negative for decreased concentration, dysphoric mood and suicidal ideas  The patient is not nervous/anxious  VITALS:  Vitals:    08/19/20 0926   BP: 122/66   Pulse: 59       LABS:  HgA1c: No results found for: HGBA1C  BMP:   Lab Results   Component Value Date    CALCIUM 8 9 11/27/2019    K 3 8 11/27/2019    CO2 31 11/27/2019     11/27/2019    BUN 15 11/27/2019    CREATININE 0 82 11/27/2019       _____________________________________________________  PHYSICAL EXAMINATION:  General: well developed and well nourished, alert, oriented times 3 and appears comfortable  Psychiatric: Normal  HEENT: Normocephalic, Atraumatic Trachea Midline, No torticollis  Pulmonary: No audible wheezing or respiratory distress   Cardiovascular: No pitting edema, 2+ radial pulse   Skin: No masses, erythema, lacerations, fluctation, ulcerations  Neurovascular: Sensation Intact to the Median, Ulnar, Radial Nerve, Motor Intact to the Median, Ulnar, Radial Nerve and Pulses Intact  Musculoskeletal: Normal, except as noted in detailed exam and in HPI  MUSCULOSKELETAL EXAMINATION:    Right Carpal Tunnel Exam:    Negative thenar atrophy  Negative phalen's test  Negative carpal tunnel compression  Positive tinels over median nerve at the wrist  Full composite fist  Brisk capillary refill   2+ radial pulse      ___________________________________________________  STUDIES REVIEWED:  No new imaging to review           PROCEDURES PERFORMED:  Procedures  No Procedures performed today    _____________________________________________________      Julian Ponce    I,:   Michelle Ribera am acting as a scribe while in the presence of the attending physician :        I,:   Kalyn Zelaya MD personally performed the services described in this documentation    as scribed in my presence :

## 2020-09-04 ENCOUNTER — OFFICE VISIT (OUTPATIENT)
Dept: ENDOCRINOLOGY | Facility: CLINIC | Age: 55
End: 2020-09-04
Payer: COMMERCIAL

## 2020-09-04 VITALS
HEART RATE: 52 BPM | BODY MASS INDEX: 20.52 KG/M2 | DIASTOLIC BLOOD PRESSURE: 64 MMHG | WEIGHT: 120.2 LBS | SYSTOLIC BLOOD PRESSURE: 100 MMHG | TEMPERATURE: 97.8 F | HEIGHT: 64 IN

## 2020-09-04 DIAGNOSIS — K59.04 CHRONIC IDIOPATHIC CONSTIPATION: ICD-10-CM

## 2020-09-04 DIAGNOSIS — E03.9 ACQUIRED HYPOTHYROIDISM: Primary | ICD-10-CM

## 2020-09-04 PROCEDURE — 99214 OFFICE O/P EST MOD 30 MIN: CPT | Performed by: INTERNAL MEDICINE

## 2020-09-04 RX ORDER — LEVOTHYROXINE SODIUM 100 MCG
100 TABLET ORAL DAILY
Qty: 30 TABLET | Refills: 3 | Status: SHIPPED | OUTPATIENT
Start: 2020-09-04 | End: 2020-11-11 | Stop reason: ALTCHOICE

## 2020-09-04 RX ORDER — LEVOTHYROXINE SODIUM 0.1 MG/1
100 TABLET ORAL DAILY
Qty: 30 TABLET | Refills: 3 | Status: SHIPPED | OUTPATIENT
Start: 2020-09-04 | End: 2020-09-04

## 2020-09-04 NOTE — PROGRESS NOTES
Shruti Galarza 48 y o  female MRN: [id number]    Encounter: [id number]      Assessment/Plan     Assessment: This is a 48y o -year-old female with hypothyroidism and constipation  Plan:  1-Hypothyroidism: Last free T4 was low: 0 71 with low TSH: 0 47  She is on Synthroid 50  Mcg daily and Liothyronine 25 mcg daily  Patient has symptoms of hypothyroidism  Her suppressed TSH is likely due to the Liothyronine  Will increase her Synthroid  to 100 mcg daily and stop Liothyronine, and repeat Thyroid tests in 6 weeks for further adjustment  2- Constipation: she has sees GI and tried many treatment with minimal response  The constipation is likely due to hypothyroidism  Will assess the response after increasing Synthroid dose  CC:   Hypothyroidism    History of Present Illness     HPI:  54-year old woman with hypothyroidism for 8 years presents for follow up  Patient has seen her PCP in the beginning of the year, she was told her levels are low and was told to increase her liothyronine from 12 5 to 25 mcg and continue levothyroxine 50 mcg daily  The repeat labs showed a low free T4 and TSH  Patient complains of low energy and fatigue  She also complains of constipation for which she has seen Gastroenterology and tried many symptomatic treatments with minimal relief  She denies weight changes, palpitations, myopathy,  heat or cold intolerance but reports hot flashes with premepause  No hair thinning or dry skin  Review of Systems   Constitutional: Positive for fatigue  Negative for activity change, appetite change, diaphoresis, fever and unexpected weight change  HENT: Negative for sore throat, trouble swallowing and voice change  Eyes: Negative for visual disturbance  Respiratory: Negative for cough and shortness of breath  Cardiovascular: Negative for chest pain, palpitations and leg swelling  Gastrointestinal: Positive for constipation  Negative for abdominal pain and diarrhea  Endocrine: Negative for cold intolerance, heat intolerance, polydipsia and polyuria  Musculoskeletal: Negative for back pain and myalgias  Neurological: Negative for dizziness, tremors, weakness and headaches  Psychiatric/Behavioral: Negative for sleep disturbance  The patient is not nervous/anxious  All other systems reviewed and are negative        Historical Information   Past Medical History:   Diagnosis Date    Depression     Disease of thyroid gland     Heart palpitations     Hypothyroidism     PONV (postoperative nausea and vomiting)      Past Surgical History:   Procedure Laterality Date    ABDOMINAL ADHESION SURGERY      APPENDECTOMY       SECTION  , ,     HYSTERECTOMY  2006    partial, still have ovaries     OTHER SURGICAL HISTORY      abdominal lysis with a band put in at age 44    AR EXCIS INTERDIGITAL Korey Bird Left 2019    Procedure: EXCISION Gilda [place];  Surgeon: Siri Alexander MD;  [facility];  Service: Orthopedics     Social History   Social History     Substance and Sexual Activity   Alcohol Use Yes    Alcohol/week: 1 0 - 2 0 standard drinks    Types: 1 - 2 Glasses of wine per week    Comment: socially     Social History     Substance and Sexual Activity   Drug Use No     Social History     Tobacco Use   Smoking Status Never Smoker   Smokeless Tobacco Never Used     Family History:   Family History   Problem Relation Age of Onset    Hypertension Father     Prostate cancer Brother     Stroke Maternal Grandfather     Prostate cancer Other         runs strongly on father's side of family        Meds/Allergies   Current Outpatient Medications   Medication Sig Dispense Refill    Ascorbic Acid (VITAMIN C) 500 MG CAPS Take 1 capsule by mouth daily in the early morning        B Complex Vitamins (VITAMIN B COMPLEX PO) Take 1 capsule by mouth daily in the early morning        buPROPion (WELLBUTRIN SR) 100 mg 12 hr tablet TAKE 1 TABLET BY [employer] TWICE DAILY  0    Calcium Carbonate (CALTRATE 600 PO) Take 1 capsule by mouth daily in the early morning        Coenzyme Q10 (CO Q 10) 100 MG CAPS Take 1 capsule by mouth daily at bedtime        liothyronine (CYTOMEL) 25 mcg tablet Take 25 mcg by mouth daily in the early morning Taking half a tablet once daily        SYNTHROID 50 MCG tablet Take 50 mcg by mouth daily in the early morning    2     No current facility-administered medications for this visit  No Known Allergies    Objective   Vitals: There were no vitals taken for this visit  Physical Exam  Vitals signs reviewed  Constitutional:       Appearance: Normal appearance  HENT:      Head: Normocephalic and atraumatic  Nose: Nose normal    Eyes:      Conjunctiva/sclera: Conjunctivae normal       Pupils: Pupils are equal, round, and reactive to light  Neck:      Musculoskeletal: Normal range of motion and neck supple  Comments: No thyromegaly  No lymph nodes palpated  Cardiovascular:      Rate and Rhythm: Normal rate  Pulses: Normal pulses  Heart sounds: Normal heart sounds  Pulmonary:      Effort: Pulmonary effort is normal       Breath sounds: Normal breath sounds  No wheezing, rhonchi or rales  Chest:      Chest wall: No tenderness  Abdominal:      General: Abdomen is flat  Bowel sounds are normal       Palpations: Abdomen is soft  Musculoskeletal: Normal range of motion  Right lower leg: No edema  Left lower leg: No edema  Lymphadenopathy:      Cervical: No cervical adenopathy  Skin:     General: Skin is warm and dry  Findings: No rash  Neurological:      General: No focal deficit present  Mental Status: She is alert and oriented to person, place, and time  Motor: No weakness  Gait: Gait normal       Deep Tendon Reflexes: Reflexes normal    Psychiatric:         Mood and Affect: Mood normal          Behavior: Behavior normal          Thought Content:  Thought content normal          Judgment: Judgment normal          The history was obtained from the review of the chart, patient  Lab Results:   Lab Results   Component Value Date/Time    [facility] 09/06/2019 03:39 PM       Imaging Studies:        I have personally reviewed pertinent reports  Portions of the record may have been created with voice recognition software  Occasional wrong word or "sound a like" substitutions may have occurred due to the inherent limitations of voice recognition software  Read the chart carefully and recognize, using context, where substitutions have occurred

## 2020-09-16 ENCOUNTER — TELEPHONE (OUTPATIENT)
Dept: OBGYN CLINIC | Facility: MEDICAL CENTER | Age: 55
End: 2020-09-16

## 2020-09-16 NOTE — TELEPHONE ENCOUNTER
I do not see EMG results in the computer, Olya Mejia would you be able to get her EMG results faxed? We need to see these before we can decide how to proceed  Thanks!

## 2020-09-16 NOTE — TELEPHONE ENCOUNTER
Patient called in with information    Done at [place] [address]  date 9/02  phone no [phone]

## 2020-09-16 NOTE — TELEPHONE ENCOUNTER
Stas Post,   Can you please call pt back and let her know she has mild carpal tunnel but no evidence of cubital tunnel  Occasionally pts can have clinical signs and symptoms of ulnar nerve compression but have no evidence of ulnar pathology of EMG   At this point no need for immediate surgical intervention

## 2020-09-16 NOTE — TELEPHONE ENCOUNTER
The patient will call us back with the location of where she got her EMG done at, and then I can call for the results    Thank You

## 2020-09-16 NOTE — TELEPHONE ENCOUNTER
Patient sees Dr Arnaldo Rice  Patient calling stating she had to cancel appointment on 9/16 to review the EMG  She is asking if she can set up a virtual or a phone call to discuss the EMG instead?     Call Back is [phone]

## 2020-09-17 DIAGNOSIS — R20.2 NUMBNESS AND TINGLING IN RIGHT HAND: Primary | ICD-10-CM

## 2020-09-17 DIAGNOSIS — M79.641 RIGHT HAND PAIN: ICD-10-CM

## 2020-09-17 DIAGNOSIS — R20.0 NUMBNESS AND TINGLING IN RIGHT HAND: Primary | ICD-10-CM

## 2020-09-17 RX ORDER — MELOXICAM 15 MG/1
15 TABLET ORAL DAILY
Qty: 30 TABLET | Refills: 2 | Status: SHIPPED | OUTPATIENT
Start: 2020-09-17 | End: 2020-11-11 | Stop reason: ALTCHOICE

## 2020-09-17 NOTE — TELEPHONE ENCOUNTER
Patient's called an EMG results were reviewed  She noted improvement with Mobic and elected to proceed conservatively with anti-inflammatories    Refill of Mobic was sent to her pharmacy, she will follow up with us as needed

## 2020-09-18 ENCOUNTER — COSMETIC (OUTPATIENT)
Dept: PLASTIC SURGERY | Facility: CLINIC | Age: 55
End: 2020-09-18

## 2020-09-18 VITALS — TEMPERATURE: 98.5 F

## 2020-09-18 DIAGNOSIS — L90.5 SCAR: Primary | ICD-10-CM

## 2020-09-18 PROCEDURE — 99214 OFFICE O/P EST MOD 30 MIN: CPT | Performed by: SURGERY

## 2020-09-18 NOTE — PROGRESS NOTES
Assessment/Plan:  Please see HPI  She has noticed considerable improvement of the submental scar on the right, notices improvement but still the presence of the prominence on the left  I suggested that she continue massage, perhaps be a bit more aggressive  We talked about the potential to lessen the prominence of by excision of the excess tissue, her preference continues to be that of avoiding a longer scar in the area  She will massage the area more aggressively and more frequently and we will re-evaluate in 2-3 months  There are no diagnoses linked to this encounter  Subjective:   Submental scar     Patient ID: Melinda Salmeron is a 48 y o  female  HPI anesthesia returns today for follow-up visit, she had initially been seen regarding and unfavorable scar of the submental region  She has noticed improvement with massage, she presents today for a follow-up visit regarding the submental scar  The following portions of the patient's history were reviewed and updated as appropriate: allergies, current medications, past family history, past medical history, past social history, past surgical history and problem list     Review of Systems   Constitutional: Negative for chills and fever  HENT: Negative for hearing loss  Eyes: Positive for visual disturbance  Negative for discharge  Respiratory: Negative for chest tightness and shortness of breath  Cardiovascular: Negative for chest pain and leg swelling  Gastrointestinal: Negative for blood in stool, constipation, diarrhea and nausea  Genitourinary: Negative for dysuria  Musculoskeletal: Negative for gait problem  Skin: Negative for rash  Allergic/Immunologic: Negative for immunocompromised state  Neurological: Negative for seizures and headaches  Hematological: Does not bruise/bleed easily  Psychiatric/Behavioral: Negative for dysphoric mood  The patient is not nervous/anxious            Objective:      Temp 98 5 °F (36 9 °C) (Temporal)          Physical Exam  Constitutional:       Appearance: Normal appearance  Eyes:      Extraocular Movements: Extraocular movements intact  Pupils: Pupils are equal, round, and reactive to light  Cardiovascular:      Rate and Rhythm: Normal rate  Pulmonary:      Effort: Pulmonary effort is normal    Abdominal:      Palpations: Abdomen is soft  Skin:     General: Skin is warm  Comments: Well-healed submental scar with prominent set left lateral most extent, less so on the right, no evidence of unusual inflammation or infection   Neurological:      General: No focal deficit present  Mental Status: She is alert and oriented to person, place, and time     Psychiatric:         Mood and Affect: Mood normal          Behavior: Behavior normal

## 2020-10-27 ENCOUNTER — TELEPHONE (OUTPATIENT)
Dept: ENDOCRINOLOGY | Facility: CLINIC | Age: 55
End: 2020-10-27

## 2020-11-02 ENCOUNTER — COSMETIC (OUTPATIENT)
Dept: PLASTIC SURGERY | Facility: CLINIC | Age: 55
End: 2020-11-02

## 2020-11-02 DIAGNOSIS — L90.5 SCAR: Primary | ICD-10-CM

## 2020-11-02 PROCEDURE — RECHECK: Performed by: SURGERY

## 2020-11-11 ENCOUNTER — OFFICE VISIT (OUTPATIENT)
Dept: ENDOCRINOLOGY | Facility: CLINIC | Age: 55
End: 2020-11-11
Payer: COMMERCIAL

## 2020-11-11 VITALS
WEIGHT: 124 LBS | SYSTOLIC BLOOD PRESSURE: 90 MMHG | HEIGHT: 64 IN | DIASTOLIC BLOOD PRESSURE: 60 MMHG | HEART RATE: 64 BPM | BODY MASS INDEX: 21.17 KG/M2 | TEMPERATURE: 97.8 F

## 2020-11-11 DIAGNOSIS — E03.9 ACQUIRED HYPOTHYROIDISM: Primary | ICD-10-CM

## 2020-11-11 PROCEDURE — 99213 OFFICE O/P EST LOW 20 MIN: CPT | Performed by: PHYSICIAN ASSISTANT

## 2020-11-11 RX ORDER — LEVOTHYROXINE SODIUM 100 MCG
100 TABLET ORAL DAILY
Qty: 90 TABLET | Refills: 3 | Status: SHIPPED | OUTPATIENT
Start: 2020-11-11 | End: 2020-11-17

## 2020-11-11 RX ORDER — LEVOTHYROXINE SODIUM 100 MCG
100 TABLET ORAL DAILY
Qty: 30 TABLET | Refills: 1 | Status: SHIPPED | OUTPATIENT
Start: 2020-11-11 | End: 2021-01-08

## 2020-11-17 RX ORDER — LEVOCETIRIZINE DIHYDROCHLORIDE 5 MG/1
5 TABLET, FILM COATED ORAL DAILY PRN
COMMUNITY

## 2020-11-23 ENCOUNTER — HOSPITAL ENCOUNTER (OUTPATIENT)
Facility: HOSPITAL | Age: 55
Setting detail: OUTPATIENT SURGERY
Discharge: HOME | End: 2020-11-23
Attending: SURGERY | Admitting: SURGERY
Payer: SELF-PAY

## 2020-11-23 VITALS
OXYGEN SATURATION: 98 % | RESPIRATION RATE: 18 BRPM | HEIGHT: 64 IN | DIASTOLIC BLOOD PRESSURE: 73 MMHG | WEIGHT: 124 LBS | TEMPERATURE: 97.8 F | HEART RATE: 86 BPM | BODY MASS INDEX: 21.17 KG/M2 | SYSTOLIC BLOOD PRESSURE: 126 MMHG

## 2020-11-23 PROCEDURE — 13131 CMPLX RPR F/C/C/M/N/AX/G/H/F: CPT | Performed by: SURGERY

## 2020-11-23 RX ORDER — LIDOCAINE HYDROCHLORIDE AND EPINEPHRINE 10; 10 MG/ML; UG/ML
INJECTION, SOLUTION INFILTRATION; PERINEURAL AS NEEDED
Status: DISCONTINUED | OUTPATIENT
Start: 2020-11-23 | End: 2020-11-23 | Stop reason: HOSPADM

## 2020-11-30 ENCOUNTER — OFFICE VISIT (OUTPATIENT)
Dept: PLASTIC SURGERY | Facility: CLINIC | Age: 55
End: 2020-11-30

## 2020-11-30 DIAGNOSIS — Z98.890 POST-OPERATIVE STATE: Primary | ICD-10-CM

## 2020-11-30 PROCEDURE — 99024 POSTOP FOLLOW-UP VISIT: CPT

## 2020-12-15 ENCOUNTER — TELEPHONE (OUTPATIENT)
Dept: RHEUMATOLOGY | Facility: CLINIC | Age: 55
End: 2020-12-15

## 2020-12-21 ENCOUNTER — NURSE TRIAGE (OUTPATIENT)
Dept: OTHER | Facility: OTHER | Age: 55
End: 2020-12-21

## 2020-12-21 DIAGNOSIS — Z20.828 EXPOSURE TO SARS-ASSOCIATED CORONAVIRUS: Primary | ICD-10-CM

## 2020-12-23 DIAGNOSIS — Z20.828 EXPOSURE TO SARS-ASSOCIATED CORONAVIRUS: ICD-10-CM

## 2020-12-23 PROCEDURE — U0003 INFECTIOUS AGENT DETECTION BY NUCLEIC ACID (DNA OR RNA); SEVERE ACUTE RESPIRATORY SYNDROME CORONAVIRUS 2 (SARS-COV-2) (CORONAVIRUS DISEASE [COVID-19]), AMPLIFIED PROBE TECHNIQUE, MAKING USE OF HIGH THROUGHPUT TECHNOLOGIES AS DESCRIBED BY CMS-2020-01-R: HCPCS | Performed by: FAMILY MEDICINE

## 2020-12-24 LAB — SARS-COV-2 RNA SPEC QL NAA+PROBE: DETECTED

## 2021-01-08 DIAGNOSIS — E03.9 ACQUIRED HYPOTHYROIDISM: ICD-10-CM

## 2021-01-08 RX ORDER — LEVOTHYROXINE SODIUM 100 MCG
TABLET ORAL
Qty: 30 TABLET | Refills: 5 | Status: SHIPPED | OUTPATIENT
Start: 2021-01-08 | End: 2021-01-11

## 2021-01-09 DIAGNOSIS — E03.9 ACQUIRED HYPOTHYROIDISM: ICD-10-CM

## 2021-01-11 RX ORDER — LEVOTHYROXINE SODIUM 100 MCG
TABLET ORAL
Qty: 30 TABLET | Refills: 5 | Status: SHIPPED | OUTPATIENT
Start: 2021-01-11 | End: 2021-05-12 | Stop reason: SDUPTHER

## 2021-02-09 ENCOUNTER — LAB (OUTPATIENT)
Dept: LAB | Facility: CLINIC | Age: 56
End: 2021-02-09
Payer: COMMERCIAL

## 2021-02-09 ENCOUNTER — OFFICE VISIT (OUTPATIENT)
Dept: RHEUMATOLOGY | Facility: CLINIC | Age: 56
End: 2021-02-09
Payer: COMMERCIAL

## 2021-02-09 ENCOUNTER — TRANSCRIBE ORDERS (OUTPATIENT)
Dept: LAB | Facility: CLINIC | Age: 56
End: 2021-02-09

## 2021-02-09 VITALS
SYSTOLIC BLOOD PRESSURE: 132 MMHG | BODY MASS INDEX: 21.28 KG/M2 | DIASTOLIC BLOOD PRESSURE: 78 MMHG | HEART RATE: 55 BPM | WEIGHT: 124 LBS

## 2021-02-09 DIAGNOSIS — E03.9 ACQUIRED HYPOTHYROIDISM: ICD-10-CM

## 2021-02-09 DIAGNOSIS — M19.90 ARTHRITIS: Primary | ICD-10-CM

## 2021-02-09 LAB
T4 FREE SERPL-MCNC: 1.1 NG/DL (ref 0.76–1.46)
TSH SERPL DL<=0.05 MIU/L-ACNC: 0.62 UIU/ML (ref 0.36–3.74)

## 2021-02-09 PROCEDURE — 84443 ASSAY THYROID STIM HORMONE: CPT

## 2021-02-09 PROCEDURE — 84439 ASSAY OF FREE THYROXINE: CPT

## 2021-02-09 PROCEDURE — 99203 OFFICE O/P NEW LOW 30 MIN: CPT | Performed by: INTERNAL MEDICINE

## 2021-02-09 PROCEDURE — 36415 COLL VENOUS BLD VENIPUNCTURE: CPT

## 2021-02-09 NOTE — PROGRESS NOTES
Rheumatology Consult   Adelfo Ash 48 y o  female 1965    DATE: 2021    Reason for Consult: hand pain and stiffness    Assessment and Plan:  Inflammatory arthritis vs  OA  Baseline hand xrays, sacroiliac joints  Check serologies, esr/crp, vit D  Topical NSAIDs,  Ibuprofen PRN joint pain  PT knees/ LS spine  RTC 6-8 weeks    History of Present Illness:  Adelfo Ash is a 48 y o  female 47 yo woman with right hand CTS, who c/o worsening neck pain and stiffness, hand pain and stiffness with intermittent swelling and knee pain  Also with right CTS in the past   No constitutional symptoms  No other complaints  Review of Systems  Review of Systems   Constitutional: Negative for chills, fatigue, fever and unexpected weight change  HENT: Negative for mouth sores and trouble swallowing  Eyes: Negative for pain and visual disturbance  Respiratory: Negative for cough and shortness of breath  Cardiovascular: Negative for chest pain and leg swelling  Gastrointestinal: Negative for abdominal pain, blood in stool, constipation, diarrhea and nausea  Musculoskeletal: Positive for arthralgias and neck stiffness  Negative for back pain, joint swelling and myalgias  Skin: Negative for color change and rash  Neurological: Negative for weakness and numbness  Hematological: Negative for adenopathy  Psychiatric/Behavioral: Negative for sleep disturbance         Allergies  No Known Allergies    Current Medications      Past Medical History  Past Medical History:   Diagnosis Date    Arthritis     Depression     Disease of thyroid gland     Heart palpitations     Hypothyroidism     PONV (postoperative nausea and vomiting)        Past Surgical History  Past Surgical History:   Procedure Laterality Date    ABDOMINAL ADHESION SURGERY      APPENDECTOMY  1978     SECTION  , ,     ECTOPIC PREGNANCY SURGERY      HYSTERECTOMY  2006    partial, still have ovaries     OTHER SURGICAL HISTORY      abdominal lysis with a band put in at age 44    ID [id number] N/A 11/23/2020    Procedure: CORRECTION UNFAVORABLE SCARRING/STANDING [address];  Surgeon: Tiny Rascon MD;  [facility];  Service: Plastics    ID EXCIS INTERDIGITAL Caty Left 2/13/2019    Procedure: EXCISION Anandana Ormond;  Surgeon: Jed Landry MD;  [facility];  Service: Orthopedics    ID RECMPL WND HEAD,FAC,HAND 2 6-7 5 CM N/A 11/23/2020    Procedure: COMPLEX CLOSURE CHIN;  Surgeon: Tiny Rascon MD;  [facility];  Service: Plastics       Family History  No known autoimmune or inflammatory diseases in the family  Family History   Problem Relation Age of Onset    Hypertension Father     Prostate cancer Brother     Stroke Maternal Grandfather     Prostate cancer Other         runs strongly on father's side of family        Social History  Occupation: counselor  Social History     Substance and Sexual Activity   Alcohol Use Yes    Alcohol/week: 1 0 - 2 0 standard drinks    Types: 1 - 2 Glasses of wine per week    Frequency: 2-3 times a week    Drinks per session: 1 or 2    Comment: socially     Social History     Substance and Sexual Activity   Drug Use No     Social History     Tobacco Use   Smoking Status Never Smoker   Smokeless Tobacco Never Used        Objective:  /78   Pulse 55   Wt 56 2 kg (124 lb)   BMI 21 28 kg/m²     Physical Exam  Musculoskeletal:         General: Tenderness and deformity present  Right hand: She exhibits tenderness and bony tenderness  Left hand: She exhibits tenderness and bony tenderness  Hands:             Lab Results: I have personally reviewed pertinent reports        CBC:   , Chemistry Profile:       Invalid input(s): ALBUMIN, Coagulation Studies:   , Cardiac Studies:   , Additional Labs:   , iSTAT CHEM 8:       Invalid input(s): POTASSIUMIS, ABG:   , Toxicology:   , Last A1C/Lipid Panel/Thyroid Panel:   Lab Results   Component Value Date    [id number] 1 573 09/06/2019    FREET4 0 78 06/14/2019     Lab Results   Component Value Date    CHENCHO NEGATIVE 09/24/2018           Invalid input(s): URIBILINOGEN         Imaging: I have personally reviewed pertinent films in PACS

## 2021-02-09 NOTE — PATIENT INSTRUCTIONS
Continue to exercise daily and perform quadriceps-strengthening exercises  Continue topical over the counter Voltaren gel and apply to your painful joints up to 4x/day  We will check blood work and perform xrays of your hands and sacroiliac joints given your lower back stiffness

## 2021-02-10 ENCOUNTER — TELEPHONE (OUTPATIENT)
Dept: ENDOCRINOLOGY | Facility: CLINIC | Age: 56
End: 2021-02-10

## 2021-02-10 NOTE — TELEPHONE ENCOUNTER
----- Message from Rosita Bonner PA-C sent at 2/9/2021  5:00 PM EST -----  Thyroid testing is normal

## 2021-04-05 DIAGNOSIS — Z23 ENCOUNTER FOR IMMUNIZATION: ICD-10-CM

## 2021-04-07 ENCOUNTER — TRANSCRIBE ORDERS (OUTPATIENT)
Dept: LAB | Facility: CLINIC | Age: 56
End: 2021-04-07

## 2021-04-07 ENCOUNTER — APPOINTMENT (OUTPATIENT)
Dept: LAB | Facility: CLINIC | Age: 56
End: 2021-04-07
Payer: COMMERCIAL

## 2021-04-07 DIAGNOSIS — M19.90 ARTHRITIS: ICD-10-CM

## 2021-04-07 DIAGNOSIS — K59.00 CONSTIPATION, UNSPECIFIED CONSTIPATION TYPE: ICD-10-CM

## 2021-04-07 DIAGNOSIS — E03.9 ACQUIRED HYPOTHYROIDISM: ICD-10-CM

## 2021-04-07 LAB
25(OH)D3 SERPL-MCNC: 31.4 NG/ML (ref 30–100)
ALBUMIN SERPL BCP-MCNC: 3.6 G/DL (ref 3.5–5)
ALP SERPL-CCNC: 63 U/L (ref 46–116)
ALT SERPL W P-5'-P-CCNC: 42 U/L (ref 12–78)
ANION GAP SERPL CALCULATED.3IONS-SCNC: 7 MMOL/L (ref 4–13)
AST SERPL W P-5'-P-CCNC: 33 U/L (ref 5–45)
BILIRUB SERPL-MCNC: 0.5 MG/DL (ref 0.2–1)
BUN SERPL-MCNC: 15 MG/DL (ref 5–25)
CALCIUM SERPL-MCNC: 8.9 MG/DL (ref 8.3–10.1)
CHLORIDE SERPL-SCNC: 105 MMOL/L (ref 100–108)
CO2 SERPL-SCNC: 29 MMOL/L (ref 21–32)
CREAT SERPL-MCNC: 0.82 MG/DL (ref 0.6–1.3)
CRP SERPL QL: <3 MG/L
ERYTHROCYTE [SEDIMENTATION RATE] IN BLOOD: 8 MM/HOUR (ref 0–29)
GFR SERPL CREATININE-BSD FRML MDRD: 81 ML/MIN/1.73SQ M
GLUCOSE P FAST SERPL-MCNC: 86 MG/DL (ref 65–99)
MAGNESIUM SERPL-MCNC: 2 MG/DL (ref 1.6–2.6)
POTASSIUM SERPL-SCNC: 3.9 MMOL/L (ref 3.5–5.3)
PROT SERPL-MCNC: 7 G/DL (ref 6.4–8.2)
RHEUMATOID FACT SER QL LA: NEGATIVE
SODIUM SERPL-SCNC: 141 MMOL/L (ref 136–145)

## 2021-04-07 PROCEDURE — 85652 RBC SED RATE AUTOMATED: CPT

## 2021-04-07 PROCEDURE — 82306 VITAMIN D 25 HYDROXY: CPT

## 2021-04-07 PROCEDURE — 83735 ASSAY OF MAGNESIUM: CPT

## 2021-04-07 PROCEDURE — 86038 ANTINUCLEAR ANTIBODIES: CPT

## 2021-04-07 PROCEDURE — 86140 C-REACTIVE PROTEIN: CPT

## 2021-04-07 PROCEDURE — 86200 CCP ANTIBODY: CPT

## 2021-04-07 PROCEDURE — 86235 NUCLEAR ANTIGEN ANTIBODY: CPT

## 2021-04-07 PROCEDURE — 86430 RHEUMATOID FACTOR TEST QUAL: CPT

## 2021-04-07 PROCEDURE — 81374 HLA I TYPING 1 ANTIGEN LR: CPT

## 2021-04-07 PROCEDURE — 36415 COLL VENOUS BLD VENIPUNCTURE: CPT

## 2021-04-07 PROCEDURE — 80053 COMPREHEN METABOLIC PANEL: CPT

## 2021-04-08 LAB
CENTROMERE B AB SER-ACNC: <0.2 AI (ref 0–0.9)
ENA RNP AB SER-ACNC: <0.2 AI (ref 0–0.9)
ENA SM AB SER-ACNC: <0.2 AI (ref 0–0.9)
ENA SS-A AB SER-ACNC: <0.2 AI (ref 0–0.9)
ENA SS-B AB SER-ACNC: <0.2 AI (ref 0–0.9)

## 2021-04-09 ENCOUNTER — HOSPITAL ENCOUNTER (OUTPATIENT)
Dept: RADIOLOGY | Facility: HOSPITAL | Age: 56
Discharge: HOME | End: 2021-04-09
Attending: INTERNAL MEDICINE
Payer: COMMERCIAL

## 2021-04-09 DIAGNOSIS — M19.90 ARTHRITIS: ICD-10-CM

## 2021-04-09 LAB
CCP AB SER IA-ACNC: 1.1
RYE IGE QN: NEGATIVE

## 2021-04-09 PROCEDURE — 72202 X-RAY EXAM SI JOINTS 3/> VWS: CPT

## 2021-04-09 PROCEDURE — 73130 X-RAY EXAM OF HAND: CPT

## 2021-04-12 ENCOUNTER — COSMETIC (OUTPATIENT)
Dept: PLASTIC SURGERY | Facility: CLINIC | Age: 56
End: 2021-04-12
Payer: COMMERCIAL

## 2021-04-12 DIAGNOSIS — L98.8 RHYTIDES: Primary | ICD-10-CM

## 2021-04-12 PROCEDURE — COSCON: Performed by: SURGERY

## 2021-04-12 NOTE — PROGRESS NOTES
Olman Pittman remains very happy with the results of the recent revision of the scar scars of the chin  I suggested that she continue to apply topical silicone and massage the sites for the next 2 months  She is interested in transferring her care here regarding Botox and Fillers, she had previously been treated for many years by her dermatologist, due to health issues with the provider, she then recently has been treated by another local plastic surgeon and is interested in transferring her care here  She is interested in Botox to treat the deep vertical glabellar creases as well as the horizontal forehead creases, and lateral crows feet  She was injected  Most recently approximately  6 weeks ago, with a reasonable result, we discussed the benefit of allowing the recent injection efficacy to dissipate prior to considering more Botox  She likely would benefit from approximately 40 units to treat the areas as mentioned above, however the definitive treatment plan will be determined at her next visit  Additionally, she has been treated with Juvederm to treat the central cheeks and nasolabial folds, and she notices a subtle contour deficiency of the superior aspect the left nasal labial fold, again given the overall appearance, I suggested that she wait a bit longer to undergo additional Fillers in order to avoid the "over injected appearance", she agrees, and will schedule an appointment for sometime in June, prior to a vacation in [place]

## 2021-04-13 ENCOUNTER — OFFICE VISIT (OUTPATIENT)
Dept: RHEUMATOLOGY | Facility: CLINIC | Age: 56
End: 2021-04-13
Payer: COMMERCIAL

## 2021-04-13 VITALS
HEART RATE: 62 BPM | BODY MASS INDEX: 21 KG/M2 | DIASTOLIC BLOOD PRESSURE: 75 MMHG | SYSTOLIC BLOOD PRESSURE: 105 MMHG | WEIGHT: 123 LBS | HEIGHT: 64 IN

## 2021-04-13 DIAGNOSIS — M19.90 ARTHRITIS: Primary | ICD-10-CM

## 2021-04-13 PROCEDURE — 99214 OFFICE O/P EST MOD 30 MIN: CPT | Performed by: INTERNAL MEDICINE

## 2021-04-13 NOTE — PROGRESS NOTES
Rheumatology follow up  Jason Donaldson 55 y o  female 1965    DATE: 4/13/2021    Reason for Consult: hand pain and stiffness    Assessment and Plan:  Osteoarthritis; Inflammatory arthritis ruled out   Pending read of hand xrays, sacroiliac joints  Inflammatory markers negative  Topical NSAIDs  Ibuprofen PRN joint pain  PT knees/ LS spine  Follow up in 6 months       History of Present Illness:  Jason Donaldson is a 55 y o  female 47 yo woman with right hand CTS, who c/o of hand pain and stiffness with intermittent swelling and knee pain  Also with right CTS in the past   No constitutional symptoms  No other complaints  Radiographs still pending read  Pts pain gets worse at the end of the day as well as after painting  She has to sleep with right wrist/ forearm splint  Pt has hip pain and right knee pain also  Has followed up with Occupational Therapy however did not find it to be helpful  Inflammatory markers negative  HLA B27 pending   States she will follow up with PT and hand surgery        Review of Systems  Review of Systems   Constitutional: Negative for chills, fatigue, fever and unexpected weight change  HENT: Negative for mouth sores and trouble swallowing  Eyes: Negative for pain and visual disturbance  Respiratory: Negative for cough and shortness of breath  Cardiovascular: Negative for chest pain and leg swelling  Gastrointestinal: Negative for abdominal pain, blood in stool, constipation, diarrhea and nausea  Musculoskeletal: Positive for arthralgias and neck stiffness  Negative for back pain, joint swelling and myalgias  Mild b/l hand stiffness    Skin: Negative for color change and rash  Neurological: Negative for weakness and numbness  Hematological: Negative for adenopathy  Psychiatric/Behavioral: Negative for sleep disturbance         Allergies  No Known Allergies    Current Medications      Past Medical History  Past Medical History: Diagnosis Date    Arthritis     Depression     Disease of thyroid gland     Heart palpitations     Hypothyroidism     PONV (postoperative nausea and vomiting)        Past Surgical History  Past Surgical History:   Procedure Laterality Date    ABDOMINAL ADHESION SURGERY      APPENDECTOMY       SECTION  , 2000   Antonia Verduzco ECTOPIC PREGNANCY SURGERY      HYSTERECTOMY      partial, still have ovaries     OTHER SURGICAL HISTORY      abdominal lysis with a band put in at age 44   Antonia Verduzco TN EXC SKIN BENIG >4 CM REMAINDR BODY N/A 2020    Procedure: CORRECTION UNFAVORABLE SCARRING/STANDING [address];  Surgeon: Andreea Drummond MD;  [facility];  Service: Plastics    [place] EXCIS INTERDIGITAL [place] Left 2019    Procedure: EXCISION Sung Bogaert;  Surgeon: Freddy Ramos MD;  [facility];  Service: Orthopedics    TN RECMPL WND HEAD,FAC,HAND 2 6-7 5 CM N/A 2020    Procedure: COMPLEX CLOSURE CHIN;  Surgeon: Andreea Drummond MD;  [facility];  Service: Plastics       Family History  No known autoimmune or inflammatory diseases in the family     Family History   Problem Relation Age of Onset    Hypertension Father     Prostate cancer Brother     Stroke Maternal Grandfather     Prostate cancer Other         runs strongly on father's side of family        Social History  Occupation: counselor  Social History     Substance and Sexual Activity   Alcohol Use Yes    Alcohol/week: 1 0 - 2 0 standard drinks    Types: 1 - 2 Glasses of wine per week    Frequency: 2-3 times a week    Drinks per session: 1 or 2    Comment: socially     Social History     Substance and Sexual Activity   Drug Use No     Social History     Tobacco Use   Smoking Status Never Smoker   Smokeless Tobacco Never Used        Objective:  /75   Pulse 62   Ht 5' 4" (1 626 m)   Wt 55 8 kg (123 lb)   BMI 21 11 kg/m²     Physical Exam  Vitals signs and nursing note reviewed  Constitutional:       General: She is not in acute distress  Appearance: Normal appearance  She is normal weight  She is not toxic-appearing  HENT:      Head: Normocephalic and atraumatic  Nose: Nose normal    Eyes:      Extraocular Movements: Extraocular movements intact  Pupils: Pupils are equal, round, and reactive to light  Neck:      Musculoskeletal: Normal range of motion and neck supple  Cardiovascular:      Rate and Rhythm: Normal rate and regular rhythm  Pulses: Normal pulses  Heart sounds: Normal heart sounds  Pulmonary:      Effort: Pulmonary effort is normal  No respiratory distress  Breath sounds: Normal breath sounds  No wheezing  Abdominal:      General: Abdomen is flat  Bowel sounds are normal  There is no distension  Palpations: Abdomen is soft  Tenderness: There is no abdominal tenderness  Musculoskeletal: Normal range of motion  General: Tenderness and deformity present  Right hand: She exhibits tenderness and bony tenderness  Left hand: She exhibits tenderness and bony tenderness  Hands:    Skin:     General: Skin is warm and dry  Capillary Refill: Capillary refill takes less than 2 seconds  Neurological:      General: No focal deficit present  Mental Status: She is alert and oriented to person, place, and time  Mental status is at baseline  Psychiatric:         Mood and Affect: Mood normal          Behavior: Behavior normal          Thought Content: Thought content normal          Judgment: Judgment normal             Lab Results: I have personally reviewed pertinent reports        CBC:   , Chemistry Profile:   Results from last 7 days   Lab Units 04/07/21  0709   POTASSIUM mmol/L 3 9   CHLORIDE mmol/L 105   CO2 mmol/L 29   BUN mg/dL 15   CREATININE mg/dL 0 82   CALCIUM mg/dL 8 9   AST U/L 33   ALT U/L 42   ALK PHOS U/L 63   EGFR ml/min/1 73sq m 81   , Coagulation Studies:   , Cardiac Studies: , Additional Labs:   Results from last 7 days   Lab Units 04/07/21  0709   MAGNESIUM mg/dL 2 0   , iSTAT CHEM 8:   Results from last 7 days   Lab Units 04/07/21  0709   ANION GAP mmol/L 7   EGFR ml/min/1 73sq m 81   , ABG:   , Toxicology:   , Last A1C/Lipid Panel/Thyroid Panel:   Lab Results   Component Value Date    [id number] 0 617 02/09/2021    FREET4 1 10 02/09/2021     Lab Results   Component Value Date    CRP <3 0 04/07/2021    CHENCHO Negative 04/07/2021    CHENCHO NEGATIVE 09/24/2018    RF Negative 04/07/2021           Invalid input(s): URIBILINOGEN         Imaging: I have personally reviewed pertinent films in PACS      ==  Marina Ramirez MD  Resident, PGY-2  Internal Medicine Residency

## 2021-04-13 NOTE — PATIENT INSTRUCTIONS
Pending read of hand xrays, sacroiliac joints  Inflammatory markers negative  Topical NSAIDs  Ibuprofen PRN joint pain    PT knees/ LS spine  Follow up in 6 months  Encouraged lifestyle and dietary modifications

## 2021-04-15 LAB — HLA-B27 QL NAA+PROBE: NEGATIVE

## 2021-05-04 DIAGNOSIS — G89.29 CHRONIC BILATERAL LOW BACK PAIN WITHOUT SCIATICA: ICD-10-CM

## 2021-05-04 DIAGNOSIS — M19.042 OSTEOARTHRITIS OF BOTH HANDS, UNSPECIFIED OSTEOARTHRITIS TYPE: ICD-10-CM

## 2021-05-04 DIAGNOSIS — M54.50 CHRONIC BILATERAL LOW BACK PAIN WITHOUT SCIATICA: ICD-10-CM

## 2021-05-04 DIAGNOSIS — M19.041 OSTEOARTHRITIS OF BOTH HANDS, UNSPECIFIED OSTEOARTHRITIS TYPE: ICD-10-CM

## 2021-05-04 DIAGNOSIS — G56.03 CARPAL TUNNEL SYNDROME ON BOTH SIDES: ICD-10-CM

## 2021-05-04 DIAGNOSIS — M19.049 HAND ARTHRITIS: Primary | ICD-10-CM

## 2021-05-05 ENCOUNTER — APPOINTMENT (OUTPATIENT)
Dept: LAB | Facility: CLINIC | Age: 56
End: 2021-05-05
Payer: COMMERCIAL

## 2021-05-05 DIAGNOSIS — E03.9 ACQUIRED HYPOTHYROIDISM: ICD-10-CM

## 2021-05-05 LAB
T4 FREE SERPL-MCNC: 0.96 NG/DL (ref 0.76–1.46)
TSH SERPL DL<=0.05 MIU/L-ACNC: 0.88 UIU/ML (ref 0.36–3.74)

## 2021-05-05 PROCEDURE — 36415 COLL VENOUS BLD VENIPUNCTURE: CPT

## 2021-05-05 PROCEDURE — 84443 ASSAY THYROID STIM HORMONE: CPT

## 2021-05-05 PROCEDURE — 84439 ASSAY OF FREE THYROXINE: CPT

## 2021-05-10 DIAGNOSIS — E55.9 VITAMIN D DEFICIENCY DISEASE: Primary | ICD-10-CM

## 2021-05-12 ENCOUNTER — TELEMEDICINE (OUTPATIENT)
Dept: ENDOCRINOLOGY | Facility: CLINIC | Age: 56
End: 2021-05-12
Payer: COMMERCIAL

## 2021-05-12 DIAGNOSIS — E03.9 ACQUIRED HYPOTHYROIDISM: ICD-10-CM

## 2021-05-12 PROCEDURE — 99213 OFFICE O/P EST LOW 20 MIN: CPT | Performed by: PHYSICIAN ASSISTANT

## 2021-05-12 RX ORDER — LEVOTHYROXINE SODIUM 100 MCG
100 TABLET ORAL DAILY
Qty: 90 TABLET | Refills: 3 | Status: SHIPPED | OUTPATIENT
Start: 2021-05-12 | End: 2022-04-18 | Stop reason: SDUPTHER

## 2021-05-12 NOTE — PROGRESS NOTES
Virtual Regular Visit      Assessment/Plan:    Problem List Items Addressed This Visit        Endocrine    Hypothyroidism     Thyroid function normal and she is feeling well  Continue Synthroid at Current dose  [facility]  Repeat lab testing in 6 months and again in 12 months and follow up in 1 year  She will call the office if she has problems between visits  Relevant Medications    Synthroid 100 MCG tablet    Other Relevant Orders    TSH, 3rd generation    T4, free    TSH, 3rd generation    T4, free               Reason for visit is   Chief Complaint   Patient presents with    Virtual Regular Visit        Encounter provider Clark Galarza PA-C    Provider located at [address] [place] [address] [phone] [facility] [id number]      Recent Visits  No visits were found meeting these conditions  Showing recent visits within past 7 days and meeting all other requirements     Today's Visits  Date Type Provider Dept   05/12/21 Telemedicine Clark Galarza PA-C [facility] today's visits and meeting all other requirements     Future Appointments  No visits were found meeting these conditions  Showing future appointments within next 150 days and meeting all other requirements        The patient was identified by name and date of birth  Melinda Salmeron was informed that this is a telemedicine visit and that the visit is being conducted through [address] [place] [address] and patient was informed that this is a secure, HIPAA-compliant platform  She agrees to proceed     My office door was closed  No one else was in the room  She acknowledged consent and understanding of privacy and security of the video platform  The patient has agreed to participate and understands they can discontinue the visit at any time      Patient is aware this is a billable service  Ryan Vale is a 55 y o  female with a history of Hypothyroidism  She is current taking Synthroid 100mcg daily  Overall she is feeling well with no complaints  Taking Synthroid separately from Calcium supplement  Constipation has improved since the last visit         HPI     Past Medical History:   Diagnosis Date    Arthritis     Depression     Disease of thyroid gland     Heart palpitations     Hypothyroidism     PONV (postoperative nausea and vomiting)        Past Surgical History:   Procedure Laterality Date    ABDOMINAL ADHESION SURGERY      APPENDECTOMY       SECTION  , ,    [facility] Muse ECTOPIC PREGNANCY SURGERY      HYSTERECTOMY      partial, still have ovaries     OTHER SURGICAL HISTORY      abdominal lysis with a band put in at age 44   Peyman Byron  [address] >4 CM REMAINDR BODY N/A 2020    Procedure: CORRECTION UNFAVORABLE SCARRING/STANDING [address];  Surgeon: Akash Álvarez MD;  [facility];  Service: Plastics    UT EXCIS INTERDIGITAL Regenia Rastafari Left 2019    Procedure: EXCISION [address];  Surgeon: Nella Deutsch MD;  [facility];  Service: Orthopedics    UT RECMPL WND HEAD,FAC,HAND 2 6-7 5 CM N/A 2020    Procedure: COMPLEX CLOSURE CHIN;  Surgeon: Akash Álvarez MD;  [facility];  Service: Plastics       Current Outpatient Medications   Medication Sig Dispense Refill    B Complex Vitamins (VITAMIN B COMPLEX PO) Take 1 capsule by mouth daily in the early morning        buPROPion (WELLBUTRIN SR) 100 mg 12 hr tablet TAKE 1 TABLET BY CONCHITAUTH TWICE DAILY  0    Calcium Carbonate (CALTRATE 600 PO) Take 1 capsule by mouth daily in the early morning        Coenzyme Q10 (CO Q 10) 100 MG CAPS Take 1 capsule by mouth daily at bedtime        levocetirizine (XYZAL) 5 MG tablet Take 5 mg by mouth daily as needed for allergies      NON FORMULARY Take 1 capsule by mouth every other day DMAE      NON FORMULARY Take 1 capsule by mouth daily Cayden Niagen      Synthroid 100 MCG tablet Take 1 tablet (100 mcg total) by mouth daily 90 tablet 3    Vitamin Mixture (PRANAV-C PO) Take 1,000 mg by mouth daily       No current facility-administered medications for this visit  No Known Allergies    Review of Systems   Constitutional: Negative for activity change, appetite change, chills, diaphoresis, fatigue, fever and unexpected weight change  HENT: Negative for trouble swallowing and voice change  Eyes: Negative for visual disturbance  Respiratory: Negative for shortness of breath  Cardiovascular: Negative for chest pain and palpitations  Gastrointestinal: Negative for abdominal pain, constipation and diarrhea  Endocrine: Negative for cold intolerance, heat intolerance, polydipsia, polyphagia and polyuria  Genitourinary: Negative for frequency and menstrual problem  Musculoskeletal: Negative for arthralgias and myalgias  Skin: Negative for rash  Allergic/Immunologic: Negative for food allergies  Neurological: Negative for dizziness and tremors  Hematological: Negative for adenopathy  Psychiatric/Behavioral: Negative for sleep disturbance  All other systems reviewed and are negative  Video Exam    There were no vitals filed for this visit  Physical Exam   Constitutional: She is oriented to person, place, and time  she appears well-developed and well-nourished  No distress  HENT:   Head: Normocephalic and atraumatic  Neck: Normal range of motion  Pulmonary/Chest: Effort normal    Musculoskeletal: Normal range of motion  Neurological: He is alert and oriented to person, place, and time  Skin: He is not diaphoretic  Psychiatric: Milly Kennedy has a normal mood and affect   Her behavior is normal      Component      Latest Ref Rng & Units 2/9/2021 2/9/2021 4/7/2021 5/5/2021           7:33 AM  7:33 AM  7:09 AM  6:54 AM   TSH 3RD [facility]      0 358 - 3 740 uIU/mL 0 617   0 880   Free T4      0 76 - 1 46 ng/dL  1 10     Vit D, 25-Hydroxy      30 0 - 100 0 ng/mL   31 4      Component      Latest Ref Rng & Units 5/5/2021           6:54 AM   TSH 3RD GENERATON      0 358 - 3 740 uIU/mL    Free T4      0 76 - 1 46 ng/dL 0 96   Vit D, 25-Hydroxy      30 0 - 100 0 ng/mL        I spent 10 minutes directly with the patient during this visit      Frandynatashaie 3 acknowledges that she has consented to an online visit or consultation  She understands that the online visit is based solely on information provided by her, and that, in the absence of a face-to-face physical evaluation by the physician, the diagnosis she receives is both limited and provisional in terms of accuracy and completeness  This is not intended to replace a full medical face-to-face evaluation by the physician  Kayden Campo understands and accepts these terms

## 2021-05-12 NOTE — ASSESSMENT & PLAN NOTE
Thyroid function normal and she is feeling well  Continue Synthroid at Current dose  [facility]  Repeat lab testing in 6 months and again in 12 months and follow up in 1 year  She will call the office if she has problems between visits

## 2021-06-04 ENCOUNTER — COSMETIC (OUTPATIENT)
Dept: PLASTIC SURGERY | Facility: CLINIC | Age: 56
End: 2021-06-04
Payer: COMMERCIAL

## 2021-06-04 DIAGNOSIS — Z41.1 ENCOUNTER FOR COSMETIC PROCEDURE: Primary | ICD-10-CM

## 2021-06-04 PROCEDURE — RECHECK: Performed by: SURGERY

## 2021-06-04 PROCEDURE — BOTOX1U PR BOTOX BY THE UNIT: Performed by: SURGERY

## 2021-06-04 PROCEDURE — JUVID4ML SL PR JUVIDERM ULTRA .4ML: Performed by: SURGERY

## 2021-06-04 NOTE — PROGRESS NOTES
Nando Guero presents today for Botox, she is quite familiar with Botox having been injected elsewhere previously  Her goals are to treat the vertical glabellar creases, horizontal forehead creases and lateral crows  Feet  Additionally, she is interested in Juvederm for the nasolabial fold  At today's visit 35 units of Botox was administered in the usual fashion to treat the glabella, brow and forehead as well as the lateral crows feet  Ice was applied post treatment  She also underwent injection of 0 55 mL of Juvederm XC to treat the nasolabial folds  She pointed out the areas to be injected while looking in a mirror, she assess the adequacy frequently following injection of small aliquots and is happy with the results  Ice was applied post treatment, and the usual post treatment instructions were given  She will be leaving for [place] for a vacation, and we will see her back in 3 months

## 2021-06-28 ENCOUNTER — COSMETIC (OUTPATIENT)
Dept: PLASTIC SURGERY | Facility: CLINIC | Age: 56
End: 2021-06-28

## 2021-06-28 VITALS — WEIGHT: 122 LBS | HEIGHT: 63 IN | TEMPERATURE: 97.7 F | BODY MASS INDEX: 21.62 KG/M2

## 2021-06-28 DIAGNOSIS — Z41.1 ENCOUNTER FOR COSMETIC PROCEDURE: ICD-10-CM

## 2021-06-28 PROCEDURE — JUVID4ML SL PR JUVIDERM ULTRA .4ML: Performed by: SURGERY

## 2021-06-28 PROCEDURE — RECHECK: Performed by: SURGERY

## 2021-06-28 NOTE — PROGRESS NOTES
Kelsey Romero returns today for additional Juvederm, she points out the superior left nasolabial fold, as well as the left alar crease as areas she would like a little additional Juvederm  Due to skin reactions in the past, she would prefer to defer the use of topical anesthetic cream   The skin was prepped with alcohol, she directed the areas to be injected, 0 2 mL total was injected in small aliquots to treat the areas of concern  She is happy with the results, she will be leaving in approximately 10 days for a trip to [place], we will see her when she returns

## 2021-07-07 ENCOUNTER — ANNUAL EXAM (OUTPATIENT)
Dept: OBGYN CLINIC | Facility: CLINIC | Age: 56
End: 2021-07-07
Payer: COMMERCIAL

## 2021-07-07 VITALS
DIASTOLIC BLOOD PRESSURE: 80 MMHG | HEIGHT: 64 IN | BODY MASS INDEX: 37.39 KG/M2 | WEIGHT: 219 LBS | SYSTOLIC BLOOD PRESSURE: 122 MMHG

## 2021-07-07 DIAGNOSIS — Z12.4 SCREENING FOR MALIGNANT NEOPLASM OF CERVIX: ICD-10-CM

## 2021-07-07 DIAGNOSIS — Z12.31 ENCOUNTER FOR SCREENING MAMMOGRAM FOR MALIGNANT NEOPLASM OF BREAST: ICD-10-CM

## 2021-07-07 DIAGNOSIS — Z01.419 ENCOUNTER FOR GYNECOLOGICAL EXAMINATION WITHOUT ABNORMAL FINDING: Primary | ICD-10-CM

## 2021-07-07 DIAGNOSIS — F41.9 ANXIETY: ICD-10-CM

## 2021-07-07 PROCEDURE — G0476 HPV COMBO ASSAY CA SCREEN: HCPCS | Performed by: OBSTETRICS & GYNECOLOGY

## 2021-07-07 PROCEDURE — G0145 SCR C/V CYTO,THINLAYER,RESCR: HCPCS | Performed by: OBSTETRICS & GYNECOLOGY

## 2021-07-07 PROCEDURE — S0610 ANNUAL GYNECOLOGICAL EXAMINA: HCPCS | Performed by: OBSTETRICS & GYNECOLOGY

## 2021-07-07 RX ORDER — ALPRAZOLAM 0.25 MG/1
0.25 TABLET ORAL 2 TIMES DAILY PRN
Qty: 6 TABLET | Refills: 0 | Status: SHIPPED | OUTPATIENT
Start: 2021-07-07 | End: 2021-10-11

## 2021-07-07 NOTE — PROGRESS NOTES
Assessment/Plan:       Diagnoses and all orders for this visit:    Encounter for gynecological examination without abnormal finding    Encounter for screening mammogram for malignant neoplasm of breast  -     Mammo screening bilateral w 3d & cad; Future    Screening for malignant neoplasm of cervix  -     Liquid-based pap, screening    Anxiety  -     ALPRAZolam (XANAX) 0 25 mg tablet; Take 1 tablet (0 25 mg total) by mouth 2 (two) times a day as needed for anxiety for up to 6 doses          Subjective:      Patient ID: Maury Cottrell is a 55 y o  female  The patient is a 52-year-old postmenopausal female who underwent a hysterectomy for benign indications  She presents today for annual exam   She is vaccinated against the coronavirus  She is following proper precautions during the pandemic  She has no serious ongoing medical problems  She is due for mammogram was schedule 1 in the near future  She has anxiety about air travel, and she was given 6 Xanax tablets for each leg of her trip to and from [facility]  She should return in 1 year or as needed  The following portions of the patient's history were reviewed and updated as appropriate: allergies, current medications, past family history, past medical history, past social history, past surgical history and problem list     Review of Systems   Constitutional: Negative for chills, diaphoresis, fatigue, fever and unexpected weight change  HENT: Negative for congestion, sinus pressure, sinus pain, tinnitus and trouble swallowing  Eyes: Negative for visual disturbance  Respiratory: Negative for cough, chest tightness and shortness of breath  Cardiovascular: Negative for chest pain, palpitations and leg swelling  Gastrointestinal: Negative for abdominal distention, abdominal pain, anal bleeding, constipation, diarrhea, nausea, rectal pain and vomiting  Endocrine: Negative for heat intolerance     Genitourinary: Negative for difficulty urinating, dysuria, flank pain, frequency, genital sores, hematuria and urgency  Musculoskeletal: Negative for arthralgias, back pain and joint swelling  Skin: Negative for rash  Allergic/Immunologic: Negative for environmental allergies and food allergies  Neurological: Negative for headaches  Hematological: Negative for adenopathy  Does not bruise/bleed easily  Psychiatric/Behavioral: Negative for decreased concentration and dysphoric mood  The patient is not nervous/anxious  Objective:      /80 (BP Location: Left arm, Patient Position: Sitting, Cuff Size: Standard)   Ht 5' 4" (1 626 m)   Wt 99 3 kg (219 lb)   BMI 37 59 kg/m²          Physical Exam  Vitals and nursing note reviewed  Exam conducted with a chaperone present  Constitutional:       General: She is not in acute distress  Appearance: Normal appearance  She is not ill-appearing  HENT:      Head: Normocephalic and atraumatic  Nose: Nose normal       Mouth/Throat:      Mouth: Mucous membranes are moist       Pharynx: Oropharynx is clear  Eyes:      Extraocular Movements: Extraocular movements intact  Cardiovascular:      Rate and Rhythm: Normal rate and regular rhythm  Pulses: Normal pulses  Heart sounds: Normal heart sounds  No murmur heard  No friction rub  Pulmonary:      Effort: Pulmonary effort is normal       Breath sounds: Normal breath sounds  Chest:      Breasts:         Right: Normal  No mass or tenderness  Left: Normal  No mass or tenderness  Abdominal:      General: Abdomen is flat  Bowel sounds are normal  There is no distension  Palpations: Abdomen is soft  There is no mass  Tenderness: There is no abdominal tenderness  There is no guarding  Hernia: No hernia is present  Genitourinary:     General: Normal vulva  Exam position: Lithotomy position  Guero stage (genital): 5  Labia:         Right: No rash, tenderness or lesion           Left: No rash, tenderness or lesion  Vagina: Normal       Uterus: Absent  Adnexa: Right adnexa normal and left adnexa normal         Right: No mass or tenderness  Left: No mass  Musculoskeletal:      Cervical back: Normal range of motion and neck supple  Lymphadenopathy:      Upper Body:      Right upper body: No axillary adenopathy  Left upper body: No axillary adenopathy  Skin:     General: Skin is warm and dry  Neurological:      General: No focal deficit present  Mental Status: She is alert and oriented to person, place, and time  Psychiatric:         Mood and Affect: Mood normal          Behavior: Behavior normal          Thought Content:  Thought content normal          Judgment: Judgment normal

## 2021-07-10 LAB
HPV HR 12 DNA CVX QL NAA+PROBE: NEGATIVE
HPV16 DNA CVX QL NAA+PROBE: NEGATIVE
HPV18 DNA CVX QL NAA+PROBE: NEGATIVE

## 2021-07-15 LAB
LAB AP GYN PRIMARY INTERPRETATION: NORMAL
Lab: NORMAL

## 2021-10-05 ENCOUNTER — HOSPITAL ENCOUNTER (EMERGENCY)
Facility: HOSPITAL | Age: 56
Discharge: HOME | End: 2021-10-05
Attending: EMERGENCY MEDICINE | Admitting: EMERGENCY MEDICINE
Payer: COMMERCIAL

## 2021-10-05 VITALS
DIASTOLIC BLOOD PRESSURE: 71 MMHG | OXYGEN SATURATION: 98 % | RESPIRATION RATE: 16 BRPM | BODY MASS INDEX: 21.34 KG/M2 | HEIGHT: 64 IN | WEIGHT: 125 LBS | TEMPERATURE: 97.9 F | HEART RATE: 80 BPM | SYSTOLIC BLOOD PRESSURE: 110 MMHG

## 2021-10-05 DIAGNOSIS — S61.219A FINGER LACERATION: Primary | ICD-10-CM

## 2021-10-05 PROCEDURE — 99282 EMERGENCY DEPT VISIT SF MDM: CPT | Performed by: EMERGENCY MEDICINE

## 2021-10-05 PROCEDURE — 99282 EMERGENCY DEPT VISIT SF MDM: CPT

## 2021-10-11 ENCOUNTER — OFFICE VISIT (OUTPATIENT)
Dept: PULMONOLOGY | Facility: CLINIC | Age: 56
End: 2021-10-11
Payer: COMMERCIAL

## 2021-10-11 VITALS
TEMPERATURE: 97.8 F | SYSTOLIC BLOOD PRESSURE: 118 MMHG | HEART RATE: 54 BPM | OXYGEN SATURATION: 98 % | DIASTOLIC BLOOD PRESSURE: 82 MMHG | HEIGHT: 64 IN | WEIGHT: 124 LBS | BODY MASS INDEX: 21.17 KG/M2

## 2021-10-11 DIAGNOSIS — R05.9 COUGH: Primary | ICD-10-CM

## 2021-10-11 PROCEDURE — 99244 OFF/OP CNSLTJ NEW/EST MOD 40: CPT | Performed by: INTERNAL MEDICINE

## 2021-10-11 RX ORDER — CLINDAMYCIN PHOSPHATE 10 UG/ML
LOTION TOPICAL
COMMUNITY
Start: 2021-10-07

## 2021-11-02 ENCOUNTER — TELEPHONE (OUTPATIENT)
Dept: PULMONOLOGY | Facility: CLINIC | Age: 56
End: 2021-11-02

## 2021-11-02 ENCOUNTER — APPOINTMENT (OUTPATIENT)
Dept: LAB | Facility: CLINIC | Age: 56
End: 2021-11-02
Payer: COMMERCIAL

## 2021-11-02 DIAGNOSIS — E03.9 ACQUIRED HYPOTHYROIDISM: ICD-10-CM

## 2021-11-02 DIAGNOSIS — R05.9 COUGH: Primary | ICD-10-CM

## 2021-11-02 LAB
T4 FREE SERPL-MCNC: 1.12 NG/DL (ref 0.76–1.46)
TSH SERPL DL<=0.05 MIU/L-ACNC: 0.55 UIU/ML (ref 0.36–3.74)

## 2021-11-02 PROCEDURE — 36415 COLL VENOUS BLD VENIPUNCTURE: CPT

## 2021-11-02 PROCEDURE — 84439 ASSAY OF FREE THYROXINE: CPT

## 2021-11-02 PROCEDURE — 84443 ASSAY THYROID STIM HORMONE: CPT

## 2021-11-04 ENCOUNTER — TELEPHONE (OUTPATIENT)
Dept: ENDOCRINOLOGY | Facility: CLINIC | Age: 56
End: 2021-11-04

## 2021-11-09 ENCOUNTER — TELEPHONE (OUTPATIENT)
Dept: PULMONOLOGY | Facility: CLINIC | Age: 56
End: 2021-11-09

## 2021-11-10 PROBLEM — J45.20 MILD INTERMITTENT ASTHMA WITHOUT COMPLICATION: Status: ACTIVE | Noted: 2021-11-10

## 2021-11-23 ENCOUNTER — NURSE TRIAGE (OUTPATIENT)
Dept: OTHER | Facility: OTHER | Age: 56
End: 2021-11-23

## 2022-01-10 ENCOUNTER — TELEMEDICINE (OUTPATIENT)
Dept: PULMONOLOGY | Facility: CLINIC | Age: 57
End: 2022-01-10
Payer: COMMERCIAL

## 2022-01-10 VITALS — WEIGHT: 122 LBS | BODY MASS INDEX: 20.83 KG/M2 | HEIGHT: 64 IN

## 2022-01-10 DIAGNOSIS — J45.20 MILD INTERMITTENT ASTHMA WITHOUT COMPLICATION: Primary | ICD-10-CM

## 2022-01-10 PROCEDURE — 99212 OFFICE O/P EST SF 10 MIN: CPT | Performed by: INTERNAL MEDICINE

## 2022-01-10 NOTE — PROGRESS NOTES
Assessment/Plan:    Mild intermittent asthma without complication  Patient continues to do well with regard to cough while taking Breo  Because of the cost of this agent on her drug insurance she only takes it every 3rd day  I looked up her drug formulary and found that Advair Diskus would be an adequate substitute  She may be able to get by by taking this product just once daily based on her response to [facility]  I would label this is cough equivalent asthma  Has she only seems to have problems in the late falls moderate spring she probably could stop taking this in April or so and see how she does  We will talk again in October  Diagnoses and all orders for this visit:    Mild intermittent asthma without complication          Subjective:      Patient ID: Nancy Cunningham is a 55 y o  female  I spoke to this patient on the telephone regarding her chronic cough  She has found dramatic improvement with the use of Breo on a daily basis  Because of the cost of this agent through heard drug insurance she only takes it every 3rd day with continued benefit  She does not sense dyspnea or wheezing  No other new issues have arisen  She previously told me that the cough is only a problem in the late fall winter and spring suggesting either indoor allergy or indoor irritant  This point I will prescribe for her Advair which is considered generic product on her drug formulary  Based on her response to every 3rd day Breo she probably could get by with the Advair once a day  She can stop taking it in April as that is when her cough seems to resolved  Potentially restart in the mid fall  We will meet again in October  10 minute phone call    Cough  Pertinent negatives include no chest pain, shortness of breath or wheezing  Her past medical history is significant for environmental allergies (May be)         The following portions of the patient's history were reviewed and updated as appropriate: allergies, current medications, past family history, past medical history, past social history, past surgical history and problem list     Review of Systems   Constitutional: Negative for activity change  Respiratory: Positive for cough  Negative for shortness of breath and wheezing  Cardiovascular: Negative for chest pain, palpitations and leg swelling  Allergic/Immunologic: Positive for environmental allergies (May be)  Objective:      Ht 5' 4" (1 626 m)   Wt 55 3 kg (122 lb)   BMI 20 94 kg/m²          Physical Exam  Neurological:      Mental Status: She is alert and oriented to person, place, and time  Psychiatric:         Mood and Affect: Mood normal          Behavior: Behavior normal        Virtual Brief Visit    Patient is located in the following state in which I hold an active license [place]      Assessment/Plan:    Problem List Items Addressed This Visit        Respiratory    Mild intermittent asthma without complication - Primary     Patient continues to do well with regard to cough while taking Breo  Because of the cost of this agent on her drug insurance she only takes it every 3rd day  I looked up her drug formulary and found that Advair Diskus would be an adequate substitute  She may be able to get by by taking this product just once daily based on her response to [place]  I would label this is cough equivalent asthma  Has she only seems to have problems in the late falls moderate spring she probably could stop taking this in April or so and see how she does  We will talk again in October  Recent Visits  No visits were found meeting these conditions    Showing recent visits within past 7 days and meeting all other requirements  Today's Visits  Date Type Provider Dept   01/10/22 Telemedicine Jimmy Dumont MD [facility] [address] today's visits and meeting all other requirements  Future Appointments  No visits were found meeting these conditions    Showing future appointments within next 150 days and meeting all other requirements         I spent Ten minutes with patient today in which greater than 50% of the time was spent in counseling/coordination of care regarding Cough

## 2022-01-10 NOTE — ASSESSMENT & PLAN NOTE
Patient continues to do well with regard to cough while taking Breo  Because of the cost of this agent on her drug insurance she only takes it every 3rd day  I looked up her drug formulary and found that Advair Diskus would be an adequate substitute  She may be able to get by by taking this product just once daily based on her response to [facility]  I would label this is cough equivalent asthma  Has she only seems to have problems in the late falls moderate spring she probably could stop taking this in April or so and see how she does  We will talk again in October

## 2022-02-17 ENCOUNTER — APPOINTMENT (OUTPATIENT)
Dept: LAB | Facility: CLINIC | Age: 57
End: 2022-02-17
Payer: COMMERCIAL

## 2022-02-17 ENCOUNTER — HOSPITAL ENCOUNTER (OUTPATIENT)
Dept: MAMMOGRAPHY | Facility: HOSPITAL | Age: 57
Discharge: HOME | End: 2022-02-17
Attending: OBSTETRICS & GYNECOLOGY
Payer: COMMERCIAL

## 2022-02-17 VITALS — HEIGHT: 64 IN | BODY MASS INDEX: 20.81 KG/M2 | WEIGHT: 121.91 LBS

## 2022-02-17 DIAGNOSIS — Z00.00 ROUTINE GENERAL MEDICAL EXAMINATION AT A HEALTH CARE FACILITY: ICD-10-CM

## 2022-02-17 DIAGNOSIS — E78.2 MIXED HYPERLIPIDEMIA: ICD-10-CM

## 2022-02-17 DIAGNOSIS — Z12.31 ENCOUNTER FOR SCREENING MAMMOGRAM FOR MALIGNANT NEOPLASM OF BREAST: ICD-10-CM

## 2022-02-17 DIAGNOSIS — E55.9 AVITAMINOSIS D: ICD-10-CM

## 2022-02-17 LAB
25(OH)D3 SERPL-MCNC: 36.4 NG/ML (ref 30–100)
ALBUMIN SERPL BCP-MCNC: 4 G/DL (ref 3.5–5)
ALP SERPL-CCNC: 66 U/L (ref 46–116)
ALT SERPL W P-5'-P-CCNC: 78 U/L (ref 12–78)
ANION GAP SERPL CALCULATED.3IONS-SCNC: 7 MMOL/L (ref 4–13)
AST SERPL W P-5'-P-CCNC: 62 U/L (ref 5–45)
BASOPHILS # BLD AUTO: 0.02 THOUSANDS/ΜL (ref 0–0.1)
BASOPHILS NFR BLD AUTO: 1 % (ref 0–1)
BILIRUB SERPL-MCNC: 0.68 MG/DL (ref 0.2–1)
BUN SERPL-MCNC: 17 MG/DL (ref 5–25)
CALCIUM SERPL-MCNC: 9.3 MG/DL (ref 8.3–10.1)
CHLORIDE SERPL-SCNC: 101 MMOL/L (ref 100–108)
CHOLEST SERPL-MCNC: 202 MG/DL
CO2 SERPL-SCNC: 29 MMOL/L (ref 21–32)
CREAT SERPL-MCNC: 0.74 MG/DL (ref 0.6–1.3)
EOSINOPHIL # BLD AUTO: 0.08 THOUSAND/ΜL (ref 0–0.61)
EOSINOPHIL NFR BLD AUTO: 2 % (ref 0–6)
ERYTHROCYTE [DISTWIDTH] IN BLOOD BY AUTOMATED COUNT: 11.4 % (ref 11.6–15.1)
GFR SERPL CREATININE-BSD FRML MDRD: 91 ML/MIN/1.73SQ M
GLUCOSE P FAST SERPL-MCNC: 92 MG/DL (ref 65–99)
HCT VFR BLD AUTO: 41.4 % (ref 34.8–46.1)
HDLC SERPL-MCNC: 85 MG/DL
HGB BLD-MCNC: 13.6 G/DL (ref 11.5–15.4)
IMM GRANULOCYTES # BLD AUTO: 0 THOUSAND/UL (ref 0–0.2)
IMM GRANULOCYTES NFR BLD AUTO: 0 % (ref 0–2)
LDLC SERPL CALC-MCNC: 103 MG/DL (ref 0–100)
LDLC SERPL DIRECT ASSAY-MCNC: 107 MG/DL (ref 0–100)
LYMPHOCYTES # BLD AUTO: 1.29 THOUSANDS/ΜL (ref 0.6–4.47)
LYMPHOCYTES NFR BLD AUTO: 35 % (ref 14–44)
MCH RBC QN AUTO: 31.5 PG (ref 26.8–34.3)
MCHC RBC AUTO-ENTMCNC: 32.9 G/DL (ref 31.4–37.4)
MCV RBC AUTO: 96 FL (ref 82–98)
MONOCYTES # BLD AUTO: 0.34 THOUSAND/ΜL (ref 0.17–1.22)
MONOCYTES NFR BLD AUTO: 9 % (ref 4–12)
NEUTROPHILS # BLD AUTO: 1.97 THOUSANDS/ΜL (ref 1.85–7.62)
NEUTS SEG NFR BLD AUTO: 53 % (ref 43–75)
NONHDLC SERPL-MCNC: 117 MG/DL
NRBC BLD AUTO-RTO: 0 /100 WBCS
PLATELET # BLD AUTO: 167 THOUSANDS/UL (ref 149–390)
PMV BLD AUTO: 9.8 FL (ref 8.9–12.7)
POTASSIUM SERPL-SCNC: 3.6 MMOL/L (ref 3.5–5.3)
PROT SERPL-MCNC: 7.5 G/DL (ref 6.4–8.2)
RBC # BLD AUTO: 4.32 MILLION/UL (ref 3.81–5.12)
SODIUM SERPL-SCNC: 137 MMOL/L (ref 136–145)
TRIGL SERPL-MCNC: 72 MG/DL
WBC # BLD AUTO: 3.7 THOUSAND/UL (ref 4.31–10.16)

## 2022-02-17 PROCEDURE — 77063 BREAST TOMOSYNTHESIS BI: CPT

## 2022-02-17 PROCEDURE — 80061 LIPID PANEL: CPT

## 2022-02-17 PROCEDURE — 82306 VITAMIN D 25 HYDROXY: CPT

## 2022-02-17 PROCEDURE — 36415 COLL VENOUS BLD VENIPUNCTURE: CPT

## 2022-02-17 PROCEDURE — 85025 COMPLETE CBC W/AUTO DIFF WBC: CPT

## 2022-02-17 PROCEDURE — 83721 ASSAY OF BLOOD LIPOPROTEIN: CPT

## 2022-02-17 PROCEDURE — 77067 SCR MAMMO BI INCL CAD: CPT

## 2022-02-17 PROCEDURE — 80053 COMPREHEN METABOLIC PANEL: CPT

## 2022-03-17 ENCOUNTER — HOSPITAL ENCOUNTER (OUTPATIENT)
Dept: RADIOLOGY | Facility: HOSPITAL | Age: 57
Discharge: HOME | End: 2022-03-17
Payer: COMMERCIAL

## 2022-03-17 ENCOUNTER — OFFICE VISIT (OUTPATIENT)
Dept: OBGYN CLINIC | Facility: CLINIC | Age: 57
End: 2022-03-17
Payer: OTHER MISCELLANEOUS

## 2022-03-17 VITALS — OXYGEN SATURATION: 100 % | HEIGHT: 64 IN | HEART RATE: 54 BPM | BODY MASS INDEX: 21.17 KG/M2 | WEIGHT: 124 LBS

## 2022-03-17 DIAGNOSIS — M25.562 LEFT KNEE PAIN, UNSPECIFIED CHRONICITY: ICD-10-CM

## 2022-03-17 DIAGNOSIS — M79.641 BILATERAL HAND PAIN: ICD-10-CM

## 2022-03-17 DIAGNOSIS — M79.642 BILATERAL HAND PAIN: ICD-10-CM

## 2022-03-17 DIAGNOSIS — M79.641 BILATERAL HAND PAIN: Primary | ICD-10-CM

## 2022-03-17 DIAGNOSIS — S86.912A STRAIN OF LEFT KNEE, INITIAL ENCOUNTER: ICD-10-CM

## 2022-03-17 DIAGNOSIS — M79.642 BILATERAL HAND PAIN: Primary | ICD-10-CM

## 2022-03-17 PROCEDURE — 73562 X-RAY EXAM OF KNEE 3: CPT

## 2022-03-17 PROCEDURE — 73130 X-RAY EXAM OF HAND: CPT

## 2022-03-17 PROCEDURE — 99213 OFFICE O/P EST LOW 20 MIN: CPT | Performed by: PHYSICIAN ASSISTANT

## 2022-03-17 NOTE — PROGRESS NOTES
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  The next day, she noticed some pain in her left knee  She also noticed a bruise on each hand  The hand pain and bruises have already resolved, but she continues with some knee pain and stiffness        The following portions of the patient's history were reviewed and updated as appropriate: allergies, current medications, past family history, past medical history, past social history, past surgical history and problem list     Review of Systems  Ortho Exam  Past Medical History:   Diagnosis Date    Arthritis     COVID-19 12/01/2020    Depression     Disease of thyroid gland     Heart palpitations     Hypothyroidism     PONV (postoperative nausea and vomiting)      Past Surgical History:   Procedure Laterality Date    ABDOMINAL ADHESION SURGERY      APPENDECTOMY  1978    AUGMENTATION MAMMAPLASTY  2010    [facility], 2000    [address]       HYSTERECTOMY  2006    partial, still have ovaries     OTHER SURGICAL HISTORY      abdominal lysis with a band put in at age 44   Ardeth Needs  [address] >4 CM REMAINDR BODY N/A 11/23/2020    Procedure: CORRECTION UNFAVORABLE SCARRING/STANDING [address];  Surgeon: Galina Nunn MD;  [facility];  Service: Plastics    [place] EXCIS INTERDIGITAL [place] Left 2/13/2019    Procedure: EXCISION [place];  Surgeon: Juan Ayon MD;  [facility];  Service: Orthopedics    AL RECMPL WND HEAD,FAC,HAND 2 6-7 5 CM N/A 11/23/2020    Procedure: COMPLEX CLOSURE CHIN;  Surgeon: Lissette Dangelo MD;  [facility];  Service: Plastics     Family History   Problem Relation Age of Onset    Hypertension Father     Prostate cancer Brother     Stroke Maternal Grandfather     Prostate cancer Other         runs strongly on father's side of family     Emphysema Paternal Uncle      Social History     Occupational History    Occupation: teacher   Tobacco Use    Smoking status: Never Smoker    Smokeless tobacco: Never Used   Vaping Use    Vaping Use: Never used   Substance and Sexual Activity    Alcohol use: Yes     Alcohol/week: 1 0 - 2 0 standard drink     Types: 1 - 2 Glasses of wine per week     Comment: socially    Drug use: No    Sexual activity: Yes     Partners: Male       Review of Systems   Constitutional: Negative  HENT: Negative  Eyes: Negative  Respiratory: Negative  Cardiovascular: Negative  Gastrointestinal: Negative  Endocrine: Negative  Genitourinary: Negative  Musculoskeletal: As below      Allergic/Immunologic: Negative  Neurological: Negative  Hematological: Negative  Psychiatric/Behavioral: Negative          Objective   Physical Exam    · Constitutional: Awake, Alert, Oriented  · Eyes: EOMI  · Psych: Mood and affect appropriate  · Heart: regular rate   · Lungs: No audible wheezing  · Abdomen: No guarding  · Lymph: no lymphedema   bilateral hand:  - Appearance   No swelling, discoloration, deformity, or ecchymosis  - ROM  o full active flexion and extension     left knee:  - Appearance   No swelling, ecchymosis, erythema, or rash  - Tenderness   Mild tenderness about the med/lat joint lines, No tenderness med/lat tibial plateaus, patella, or patellar tendon  - Effusion   None  - ROM   0-120 vs 0-140 on the contralateral side  - Special Tests    No laxity with valgus, varus, Lachman, ant drawer, or posterior drawer testing  No patellar apprehension  No Sebs  - Motor 5/5 in all planes  - NVI distally        I have personally reviewed pertinent films in PACS and my interpretation is Hands - no acute displaced fracture  Knee - no acute displaced fracture  There is a small ossific density above the patella on lateral view  This is rounded and non-acute in appearance  Non-tender clinically  Minerva Moore

## 2022-04-14 ENCOUNTER — TELEPHONE (OUTPATIENT)
Dept: OBGYN CLINIC | Facility: OTHER | Age: 57
End: 2022-04-14

## 2022-04-14 NOTE — TELEPHONE ENCOUNTER
Norma @ [facility] called to see if we can fax MRI Report of the Left Knee  I advised that it does not look like A) she had it done B) its not scheduled or C) she is going outside of [facility] explained understanding, she will contact patient     [phone]

## 2022-04-18 ENCOUNTER — APPOINTMENT (OUTPATIENT)
Dept: LAB | Facility: CLINIC | Age: 57
End: 2022-04-18
Payer: COMMERCIAL

## 2022-04-18 DIAGNOSIS — E03.9 ACQUIRED HYPOTHYROIDISM: ICD-10-CM

## 2022-04-18 DIAGNOSIS — R94.5 NONSPECIFIC ABNORMAL RESULTS OF LIVER FUNCTION STUDY: ICD-10-CM

## 2022-04-18 LAB
ALBUMIN SERPL BCP-MCNC: 3.7 G/DL (ref 3.5–5)
ALP SERPL-CCNC: 61 U/L (ref 46–116)
ALT SERPL W P-5'-P-CCNC: 50 U/L (ref 12–78)
AST SERPL W P-5'-P-CCNC: 33 U/L (ref 5–45)
BILIRUB DIRECT SERPL-MCNC: 0.12 MG/DL (ref 0–0.2)
BILIRUB SERPL-MCNC: 0.46 MG/DL (ref 0.2–1)
PROT SERPL-MCNC: 7.2 G/DL (ref 6.4–8.2)
T4 FREE SERPL-MCNC: 1.14 NG/DL (ref 0.76–1.46)
TSH SERPL DL<=0.05 MIU/L-ACNC: 1.13 UIU/ML (ref 0.45–4.5)

## 2022-04-18 PROCEDURE — 80076 HEPATIC FUNCTION PANEL: CPT

## 2022-04-18 PROCEDURE — 36415 COLL VENOUS BLD VENIPUNCTURE: CPT

## 2022-04-18 PROCEDURE — 84439 ASSAY OF FREE THYROXINE: CPT

## 2022-04-18 PROCEDURE — 84443 ASSAY THYROID STIM HORMONE: CPT

## 2022-04-18 RX ORDER — LEVOTHYROXINE SODIUM 100 MCG
100 TABLET ORAL DAILY
Qty: 90 TABLET | Refills: 3 | Status: SHIPPED | OUTPATIENT
Start: 2022-04-18

## 2022-04-19 ENCOUNTER — TELEPHONE (OUTPATIENT)
Dept: ENDOCRINOLOGY | Facility: CLINIC | Age: 57
End: 2022-04-19

## 2022-04-19 NOTE — TELEPHONE ENCOUNTER
----- Message from Norberto Msutafa PA-C sent at 4/18/2022  8:40 PM EDT -----  Thyroid labs are normal  Head atraumatic, normal cephalic shape.

## 2022-04-25 ENCOUNTER — OFFICE VISIT (OUTPATIENT)
Dept: OBGYN CLINIC | Facility: CLINIC | Age: 57
End: 2022-04-25
Payer: OTHER MISCELLANEOUS

## 2022-04-25 VITALS — BODY MASS INDEX: 21.17 KG/M2 | WEIGHT: 124 LBS | HEIGHT: 64 IN

## 2022-04-25 DIAGNOSIS — M25.562 ACUTE PAIN OF LEFT KNEE: Primary | ICD-10-CM

## 2022-04-25 PROCEDURE — 99214 OFFICE O/P EST MOD 30 MIN: CPT | Performed by: PHYSICAL MEDICINE & REHABILITATION

## 2022-04-25 NOTE — PATIENT INSTRUCTIONS
Room temperature/warm soaks with epsom salt can help with muscle tightness/cramping  Epsom salt releases magnesium which can be helpful  Rules for limiting activity by pain symptoms:      -You can work through some pain, but keep it at a level from which you are distractible  Pain should not exceed 3/10 in severity    -Do not change your biomechanics / form with your activity  This can lead to further injury    -If you pay for your activity later with substantial symptom exacerbation, you are not yet ready for that level of exertion
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
## 2022-10-12 PROBLEM — R05.9 COUGH: Status: RESOLVED | Noted: 2021-10-11 | Resolved: 2022-10-12

## 2022-11-28 ENCOUNTER — ANNUAL EXAM (OUTPATIENT)
Dept: OBGYN CLINIC | Facility: CLINIC | Age: 57
End: 2022-11-28

## 2022-11-28 VITALS
DIASTOLIC BLOOD PRESSURE: 70 MMHG | BODY MASS INDEX: 21 KG/M2 | SYSTOLIC BLOOD PRESSURE: 110 MMHG | HEIGHT: 64 IN | WEIGHT: 123 LBS

## 2022-11-28 DIAGNOSIS — Z12.31 SCREENING MAMMOGRAM, ENCOUNTER FOR: ICD-10-CM

## 2022-11-28 DIAGNOSIS — Z01.419 ENCOUNTER FOR GYNECOLOGICAL EXAMINATION WITHOUT ABNORMAL FINDING: Primary | ICD-10-CM

## 2022-11-28 NOTE — PROGRESS NOTES
Assessment/Plan:        There are no diagnoses linked to this encounter  Subjective:      Patient ID: Maryann Recio is a 65 y o  female  The patient is a 60-year-old  4 para 3013 who underwent hysterectomy for benign indications several years ago  She presents for annual exam   She has no complaints related to OBGYN  She has had no medical changes or problems over the past year  She has been in very good health  She exercises regularly  She is due for mammogram in February and will schedule 1 following her anniversary date  She has had several normal Pap smears following the hysterectomy and does not need a Pap smear today  Will see her back in 1 year or as needed  The following portions of the patient's history were reviewed and updated as appropriate: allergies, current medications, past family history, past medical history, past social history, past surgical history and problem list     Review of Systems   Constitutional: Negative for chills, diaphoresis, fatigue, fever and unexpected weight change  HENT: Negative for congestion, sinus pressure, sinus pain, tinnitus and trouble swallowing  Eyes: Negative for visual disturbance  Respiratory: Negative for cough, chest tightness and shortness of breath  Cardiovascular: Negative for chest pain, palpitations and leg swelling  Gastrointestinal: Negative for abdominal distention, abdominal pain, anal bleeding, constipation, diarrhea, nausea, rectal pain and vomiting  Endocrine: Negative for heat intolerance  Genitourinary: Negative for difficulty urinating, dysuria, flank pain, frequency, genital sores, hematuria and urgency  Musculoskeletal: Negative for arthralgias, back pain and joint swelling  Skin: Negative for rash  Allergic/Immunologic: Negative for environmental allergies and food allergies  Neurological: Negative for headaches  Hematological: Negative for adenopathy  Does not bruise/bleed easily  Psychiatric/Behavioral: Negative for decreased concentration and dysphoric mood  The patient is not nervous/anxious  Objective:      /70 (BP Location: Left arm, Patient Position: Sitting)   Ht 5' 4" (1 626 m)   Wt 55 8 kg (123 lb)   BMI 21 11 kg/m²          Physical Exam  Vitals and nursing note reviewed  Exam conducted with a chaperone present  Constitutional:       General: She is not in acute distress  Appearance: Normal appearance  She is not ill-appearing  HENT:      Head: Normocephalic and atraumatic  Nose: Nose normal       Mouth/Throat:      Mouth: Mucous membranes are moist       Pharynx: Oropharynx is clear  Eyes:      Extraocular Movements: Extraocular movements intact  Cardiovascular:      Rate and Rhythm: Normal rate and regular rhythm  Pulses: Normal pulses  Heart sounds: Normal heart sounds  No murmur heard  No friction rub  Pulmonary:      Effort: Pulmonary effort is normal       Breath sounds: Normal breath sounds  Chest:   Breasts:     Right: Normal  No mass or tenderness  Left: Normal  No mass or tenderness  Abdominal:      General: Abdomen is flat  Bowel sounds are normal  There is no distension  Palpations: Abdomen is soft  There is no mass  Tenderness: There is no abdominal tenderness  There is no guarding  Hernia: No hernia is present  Genitourinary:     General: Normal vulva  Exam position: Lithotomy position  Guero stage (genital): 5  Labia:         Right: No rash, tenderness or lesion  Left: No rash, tenderness or lesion  Vagina: Normal       Uterus: Absent  Adnexa: Right adnexa normal and left adnexa normal         Right: No mass or tenderness  Left: No mass  Musculoskeletal:      Cervical back: Normal range of motion and neck supple  Lymphadenopathy:      Upper Body:      Right upper body: No axillary adenopathy  Left upper body: No axillary adenopathy  Skin:     General: Skin is warm and dry  Neurological:      General: No focal deficit present  Mental Status: She is alert and oriented to person, place, and time  Psychiatric:         Mood and Affect: Mood normal          Behavior: Behavior normal          Thought Content:  Thought content normal          Judgment: Judgment normal

## 2022-12-08 ENCOUNTER — OFFICE VISIT (OUTPATIENT)
Dept: ENDOCRINOLOGY | Facility: CLINIC | Age: 57
End: 2022-12-08

## 2022-12-08 VITALS
WEIGHT: 123.4 LBS | SYSTOLIC BLOOD PRESSURE: 118 MMHG | DIASTOLIC BLOOD PRESSURE: 72 MMHG | BODY MASS INDEX: 21.07 KG/M2 | HEART RATE: 88 BPM | HEIGHT: 64 IN

## 2022-12-08 DIAGNOSIS — E03.9 ACQUIRED HYPOTHYROIDISM: ICD-10-CM

## 2022-12-08 RX ORDER — LEVOTHYROXINE SODIUM 100 MCG
100 TABLET ORAL DAILY
Qty: 90 TABLET | Refills: 3 | Status: SHIPPED | OUTPATIENT
Start: 2022-12-08

## 2022-12-08 NOTE — PROGRESS NOTES
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   • Hallux valgus, right   • Constipation   • Bloating   • Symptomatic spider varicose vein   • Mild intermittent asthma without complication   • Acute pain of left knee      Past Medical History:   Diagnosis Date   • Arthritis    • COVID-19 12/01/2020   • Depression    • Disease of thyroid gland    • Heart palpitations    • Hypothyroidism    • PONV (postoperative nausea and vomiting)       Past Surgical History:   Procedure Laterality Date   • ABDOMINAL ADHESION SURGERY     • APPENDECTOMY  1978   • AUGMENTATION MAMMAPLASTY  2010   • [place], 1991, 2000   • ECTOPIC PREGNANCY SURGERY     • HYSTERECTOMY  2006    partial, still have ovaries    • OTHER SURGICAL HISTORY      abdominal lysis with a band put in at age 44   • [address] >4 CM REMAINDR BODY N/A 11/23/2020    Procedure: CORRECTION UNFAVORABLE SCARRING/STANDING CONES CHIN AND NECK;  Surgeon: Galina Nunn MD;  [facility];  Service: Plastics   • [place] EXCIS INTERDIGITAL [place] Left 2/13/2019    Procedure: EXCISION Raul Mimi;  Surgeon: Zelalem Lomas MD;  [facility];  Service: Orthopedics   • [place] RECMPL WND HEAD,FAC,HAND 2 6-7 5 CM N/A 11/23/2020    Procedure: COMPLEX CLOSURE CHIN;  Surgeon: Galina Nunn MD;  [facility];  Service: Plastics      Family History   Problem Relation Age of Onset   • Hypertension Father    • Prostate cancer Brother    • Stroke Maternal Grandfather    • Prostate cancer Other         runs strongly on father's side of family    • Emphysema Paternal Uncle      Social History     Tobacco Use   • Smoking status: Never   • Smokeless tobacco: Never   Substance Use Topics   • Alcohol use: Yes     Alcohol/week: 1 0 - 2 0 standard drink     Types: 1 - 2 Glasses of wine per week     Comment: socially     No Known Allergies    Current Outpatient Medications:   •  ascorbic Acid (VITAMIN C) 500 MG CPCR, Take 1 capsule by mouth daily, Disp: , Rfl:   •  B Complex Vitamins (VITAMIN B COMPLEX PO), Take 1 capsule by mouth daily in the early morning  , Disp: , Rfl:   •  buPROPion (WELLBUTRIN SR) 100 mg 12 hr tablet, TAKE 1 TABLET BY MOIUTH TWICE DAILY, Disp: , Rfl: 0  •  Calcium Carbonate (CALTRATE 600 PO), Take 1 capsule by mouth daily in the early morning  , Disp: , Rfl:   •  clindamycin (CLEOCIN T) 1 % lotion, APPLY TO FACE TWICE A DAY, Disp: , Rfl:   •  Coenzyme Q10 (CO Q 10) 100 MG CAPS, Take 1 capsule by mouth daily at bedtime  , Disp: , Rfl:   •  levocetirizine (XYZAL) 5 MG tablet, Take 5 mg by mouth daily as needed for allergies , Disp: , Rfl:   •  NON FORMULARY, Take 1 capsule by mouth every other day DMAE , Disp: , Rfl:   •  NON FORMULARY, Take 1 capsule by mouth daily Cayden Niagen, Disp: , Rfl:   •  Synthroid 100 MCG tablet, Take 1 tablet (100 mcg total) by mouth daily, Disp: 90 tablet, Rfl: 3  •  Vitamin Mixture (PRANAV-C PO), Take 1,000 mg by mouth daily, Disp: , Rfl:     Review of Systems   Constitutional: Negative for activity change, appetite change, chills, diaphoresis, fatigue, fever and unexpected weight change  HENT: Negative for trouble swallowing and voice change  Eyes: Negative for visual disturbance  Respiratory: Negative for shortness of breath  Cardiovascular: Negative for chest pain and palpitations     Gastrointestinal: Negative for abdominal pain, constipation and diarrhea  Endocrine: Negative for cold intolerance, heat intolerance, polydipsia, polyphagia and polyuria  Genitourinary: Negative for frequency and menstrual problem  Musculoskeletal: Negative for arthralgias and myalgias  Skin: Negative for rash  Allergic/Immunologic: Negative for food allergies  Neurological: Negative for dizziness and tremors  Hematological: Negative for adenopathy  Psychiatric/Behavioral: Negative for sleep disturbance  All other systems reviewed and are negative  Physical Exam:  Body mass index is 21 18 kg/m²  /72   Pulse 88   Ht 5' 4" (1 626 m)   Wt 56 kg (123 lb 6 4 oz)   BMI 21 18 kg/m²    Wt Readings from Last 3 Encounters:   12/08/22 56 kg (123 lb 6 4 oz)   11/28/22 55 8 kg (123 lb)   04/25/22 56 2 kg (124 lb)       Physical Exam  Vitals reviewed  Constitutional:       General: She is not in acute distress  Appearance: She is well-developed  HENT:      Head: Normocephalic and atraumatic  Eyes:      Conjunctiva/sclera: Conjunctivae normal       Pupils: Pupils are equal, round, and reactive to light  Neck:      Thyroid: No thyromegaly  Cardiovascular:      Rate and Rhythm: Normal rate and regular rhythm  Heart sounds: Normal heart sounds  Pulmonary:      Effort: Pulmonary effort is normal  No respiratory distress  Breath sounds: Normal breath sounds  No wheezing or rales  Abdominal:      General: Bowel sounds are normal  There is no distension  Palpations: Abdomen is soft  Tenderness: There is no abdominal tenderness  Musculoskeletal:         General: Normal range of motion  Cervical back: Normal range of motion and neck supple  Skin:     General: Skin is warm and dry  Neurological:      Mental Status: She is alert and oriented to person, place, and time           Labs:     Component      Latest Ref Rng & Units 5/5/2021 5/5/2021 11/2/2021 11/2/2021           6:54 AM  6:54 AM 11:18 AM 11:18 AM   TSH 3RD GENERATON      0 450 - 4 500 uIU/mL 0 880   0 552   Free T4      0 76 - 1 46 ng/dL  0 96 1 12    Vit D, 25-Hydroxy      30 0 - 100 0 ng/mL         Component      Latest Ref Rng & Units 2/17/2022 4/18/2022 4/18/2022           1:09 PM  8:41 AM  8:41 AM   TSH 3RD GENERATON      0 450 - 4 500 uIU/mL   1 133   Free T4      0 76 - 1 46 ng/dL  1 14    Vit D, 25-Hydroxy      30 0 - 100 0 ng/mL 36 4         Impression & Plan:    Problem List Items Addressed This Visit        Endocrine    Hypothyroidism     Thyroid function remains normal  Repeat lab testing in spring  Since thyroid function has been stable, suggested that she could follow up with Primary Care for continued treatment of hypothyroidism  She would prefer to remain under care of endocrinology  Relevant Medications    Synthroid 100 MCG tablet    Other Relevant Orders    TSH, 3rd generation    T4, free       Orders Placed This Encounter   Procedures   • TSH, 3rd generation     This is a patient instruction: This test is non-fasting  Please drink two glasses of water morning of bloodwork  Standing Status:   Future     Standing Expiration Date:   12/8/2023   • T4, free     Standing Status:   Future     Standing Expiration Date:   12/8/2023       There are no Patient Instructions on file for this visit  Discussed with the patient and all questioned fully answered  She will call me if any problems arise  Follow-up appointment in 12 months       Counseled patient on diagnostic results, prognosis, risk and benefit of treatment options, instruction for management, importance of treatment compliance, Risk  factor reduction and impressions      Lesa Gowers, PA-C

## 2022-12-08 NOTE — ASSESSMENT & PLAN NOTE
Thyroid function remains normal  Repeat lab testing in spring  Since thyroid function has been stable, suggested that she could follow up with Primary Care for continued treatment of hypothyroidism  She would prefer to remain under care of endocrinology

## 2023-03-06 ENCOUNTER — APPOINTMENT (OUTPATIENT)
Dept: LAB | Facility: CLINIC | Age: 58
End: 2023-03-06

## 2023-03-06 DIAGNOSIS — Z00.00 ROUTINE GENERAL MEDICAL EXAMINATION AT A HEALTH CARE FACILITY: ICD-10-CM

## 2023-03-06 DIAGNOSIS — E03.9 ACQUIRED HYPOTHYROIDISM: ICD-10-CM

## 2023-03-06 DIAGNOSIS — D70.8 GRANULOCYTIC APLASIA (HCC): ICD-10-CM

## 2023-03-06 DIAGNOSIS — E55.9 AVITAMINOSIS D: ICD-10-CM

## 2023-03-06 LAB
25(OH)D3 SERPL-MCNC: 36.2 NG/ML (ref 30–100)
ALBUMIN SERPL BCP-MCNC: 4 G/DL (ref 3.5–5)
ALP SERPL-CCNC: 52 U/L (ref 34–104)
ALT SERPL W P-5'-P-CCNC: 40 U/L (ref 7–52)
ANION GAP SERPL CALCULATED.3IONS-SCNC: 6 MMOL/L (ref 4–13)
AST SERPL W P-5'-P-CCNC: 40 U/L (ref 13–39)
BASOPHILS # BLD AUTO: 0.01 THOUSANDS/ÂΜL (ref 0–0.1)
BASOPHILS NFR BLD AUTO: 0 % (ref 0–1)
BILIRUB SERPL-MCNC: 0.45 MG/DL (ref 0.2–1)
BUN SERPL-MCNC: 12 MG/DL (ref 5–25)
CALCIUM SERPL-MCNC: 9.1 MG/DL (ref 8.4–10.2)
CHLORIDE SERPL-SCNC: 103 MMOL/L (ref 96–108)
CHOLEST SERPL-MCNC: 154 MG/DL
CO2 SERPL-SCNC: 29 MMOL/L (ref 21–32)
CREAT SERPL-MCNC: 0.78 MG/DL (ref 0.6–1.3)
EOSINOPHIL # BLD AUTO: 0.08 THOUSAND/ÂΜL (ref 0–0.61)
EOSINOPHIL NFR BLD AUTO: 2 % (ref 0–6)
ERYTHROCYTE [DISTWIDTH] IN BLOOD BY AUTOMATED COUNT: 11.9 % (ref 11.6–15.1)
GFR SERPL CREATININE-BSD FRML MDRD: 84 ML/MIN/1.73SQ M
GLUCOSE P FAST SERPL-MCNC: 90 MG/DL (ref 65–99)
HCT VFR BLD AUTO: 40 % (ref 34.8–46.1)
HDLC SERPL-MCNC: 55 MG/DL
HGB BLD-MCNC: 13 G/DL (ref 11.5–15.4)
IMM GRANULOCYTES # BLD AUTO: 0.01 THOUSAND/UL (ref 0–0.2)
IMM GRANULOCYTES NFR BLD AUTO: 0 % (ref 0–2)
LDLC SERPL CALC-MCNC: 87 MG/DL (ref 0–100)
LYMPHOCYTES # BLD AUTO: 1.08 THOUSANDS/ÂΜL (ref 0.6–4.47)
LYMPHOCYTES NFR BLD AUTO: 32 % (ref 14–44)
MCH RBC QN AUTO: 30.9 PG (ref 26.8–34.3)
MCHC RBC AUTO-ENTMCNC: 32.5 G/DL (ref 31.4–37.4)
MCV RBC AUTO: 95 FL (ref 82–98)
MONOCYTES # BLD AUTO: 0.42 THOUSAND/ÂΜL (ref 0.17–1.22)
MONOCYTES NFR BLD AUTO: 13 % (ref 4–12)
NEUTROPHILS # BLD AUTO: 1.76 THOUSANDS/ÂΜL (ref 1.85–7.62)
NEUTS SEG NFR BLD AUTO: 53 % (ref 43–75)
NONHDLC SERPL-MCNC: 99 MG/DL
NRBC BLD AUTO-RTO: 0 /100 WBCS
PLATELET # BLD AUTO: 148 THOUSANDS/UL (ref 149–390)
PMV BLD AUTO: 10.1 FL (ref 8.9–12.7)
POTASSIUM SERPL-SCNC: 3.9 MMOL/L (ref 3.5–5.3)
PROT SERPL-MCNC: 6.3 G/DL (ref 6.4–8.4)
RBC # BLD AUTO: 4.21 MILLION/UL (ref 3.81–5.12)
SODIUM SERPL-SCNC: 138 MMOL/L (ref 135–147)
T4 FREE SERPL-MCNC: 1.22 NG/DL (ref 0.76–1.46)
TRIGL SERPL-MCNC: 58 MG/DL
TSH SERPL DL<=0.05 MIU/L-ACNC: 1.97 UIU/ML (ref 0.45–4.5)
WBC # BLD AUTO: 3.36 THOUSAND/UL (ref 4.31–10.16)

## 2023-03-07 ENCOUNTER — TELEPHONE (OUTPATIENT)
Dept: ENDOCRINOLOGY | Facility: CLINIC | Age: 58
End: 2023-03-07

## 2023-03-07 DIAGNOSIS — E03.9 ACQUIRED HYPOTHYROIDISM: Primary | ICD-10-CM

## 2023-03-07 NOTE — TELEPHONE ENCOUNTER
----- Message from Kelsea Hernandez PA-C sent at 3/6/2023  4:36 PM EST -----  Thyroid lab testing is normal  Continue current medication  Repeat lab testing in December and follow up after labs

## 2023-06-12 ENCOUNTER — HOSPITAL ENCOUNTER (OUTPATIENT)
Dept: MAMMOGRAPHY | Facility: HOSPITAL | Age: 58
Discharge: HOME | End: 2023-06-12
Attending: OBSTETRICS & GYNECOLOGY
Payer: COMMERCIAL

## 2023-06-12 VITALS — WEIGHT: 122 LBS | BODY MASS INDEX: 20.83 KG/M2 | HEIGHT: 64 IN

## 2023-06-12 DIAGNOSIS — Z12.31 SCREENING MAMMOGRAM, ENCOUNTER FOR: ICD-10-CM

## 2023-06-12 PROCEDURE — 77067 SCR MAMMO BI INCL CAD: CPT

## 2023-06-12 PROCEDURE — 77063 BREAST TOMOSYNTHESIS BI: CPT

## 2023-06-13 NOTE — RESULT ENCOUNTER NOTE
Fanny Hopson,     Your mammogram results came back normal  Please repeat in one year  If you have any questions or concerns please call the office here at [phone]       Thank you,   Erik Dickey

## 2023-12-07 ENCOUNTER — TELEPHONE (OUTPATIENT)
Dept: GASTROENTEROLOGY | Facility: CLINIC | Age: 58
End: 2023-12-07

## 2023-12-07 NOTE — TELEPHONE ENCOUNTER
Pt is due for a colonoscopy with Dr Grace Arguello for hx of polyps. I spoke to pt whom informed she will call back to schedule since can not do right now as she is at work. If do not hear back from pt in two weeks, will mail recall letter as a reminder.

## 2023-12-14 NOTE — TELEPHONE ENCOUNTER
I lmom for pt to please call back to schedule a colonoscopy with Dr Mala Loco.  Will call again if do not hear back from pt.

## 2023-12-21 DIAGNOSIS — E03.9 ACQUIRED HYPOTHYROIDISM: ICD-10-CM

## 2023-12-21 RX ORDER — LEVOTHYROXINE SODIUM 100 MCG
TABLET ORAL
Qty: 90 TABLET | Refills: 3 | Status: SHIPPED | OUTPATIENT
Start: 2023-12-21

## 2024-02-20 ENCOUNTER — ANNUAL EXAM (OUTPATIENT)
Dept: OBGYN CLINIC | Facility: CLINIC | Age: 59
End: 2024-02-20
Payer: COMMERCIAL

## 2024-02-20 VITALS
DIASTOLIC BLOOD PRESSURE: 80 MMHG | WEIGHT: 123.8 LBS | BODY MASS INDEX: 21.13 KG/M2 | SYSTOLIC BLOOD PRESSURE: 138 MMHG | HEIGHT: 64 IN

## 2024-02-20 DIAGNOSIS — Z11.51 SCREENING FOR HPV (HUMAN PAPILLOMAVIRUS): ICD-10-CM

## 2024-02-20 DIAGNOSIS — Z12.31 ENCOUNTER FOR SCREENING MAMMOGRAM FOR MALIGNANT NEOPLASM OF BREAST: ICD-10-CM

## 2024-02-20 DIAGNOSIS — Z01.419 WELL WOMAN EXAM WITH ROUTINE GYNECOLOGICAL EXAM: Primary | ICD-10-CM

## 2024-02-20 DIAGNOSIS — Z12.4 ENCOUNTER FOR SCREENING FOR MALIGNANT NEOPLASM OF CERVIX: ICD-10-CM

## 2024-02-20 PROCEDURE — S0612 ANNUAL GYNECOLOGICAL EXAMINA: HCPCS | Performed by: OBSTETRICS & GYNECOLOGY

## 2024-02-20 RX ORDER — B-COMPLEX WITH VITAMIN C
1 CAPSULE ORAL DAILY
COMMUNITY

## 2024-02-20 RX ORDER — SCOLOPAMINE TRANSDERMAL SYSTEM 1 MG/1
1 PATCH, EXTENDED RELEASE TRANSDERMAL
COMMUNITY
Start: 2023-04-06 | End: 2024-04-05

## 2024-02-20 RX ORDER — IVERMECTIN 10 MG/G
CREAM TOPICAL
COMMUNITY
Start: 2023-12-27

## 2024-02-20 NOTE — PROGRESS NOTES
Assessment/Plan:         Diagnoses and all orders for this visit:    Well woman exam with routine gynecological exam    Encounter for screening for malignant neoplasm of cervix    Screening for HPV (human papillomavirus)    Encounter for screening mammogram for malignant neoplasm of breast  -     Mammo screening bilateral w 3d & cad; Future    Other orders  -     Soolantra 1 % CREA; APPLY TO THE FACE AT BEDTIME  -     scopolamine (TRANSDERM-SCOP) 1 mg/3 days TD 72 hr patch; Place 1 patch on the skin every third day  -     B Complex-C CAPS; Take 1 capsule by mouth daily          Subjective:      Patient ID: Swetha Zuniga is a 59 y.o. female.    The patient is a 59-year-old  4 para 3013 who underwent hysterectomy for benign indications several years ago.  She presents for annual exam.  She has no complaints related to OBGYN.  She has had no medical changes or problems over the past year.  She has been in very good health.  She exercises regularly.  She is due for mammogram in  and will schedule 1 following her anniversary date.  She has had several normal Pap smears following the hysterectomy and does not need a Pap smear today.  Will see her back in 1 year or as needed.        The following portions of the patient's history were reviewed and updated as appropriate: allergies, current medications, past family history, past medical history, past social history, past surgical history, and problem list.    Review of Systems   Constitutional:  Negative for chills, diaphoresis, fatigue, fever and unexpected weight change.   HENT:  Negative for congestion, sinus pressure, sinus pain, tinnitus and trouble swallowing.    Eyes:  Negative for visual disturbance.   Respiratory:  Negative for cough, chest tightness and shortness of breath.    Cardiovascular:  Negative for chest pain, palpitations and leg swelling.   Gastrointestinal:  Negative for abdominal distention, abdominal pain, anal bleeding,  "constipation, diarrhea, nausea, rectal pain and vomiting.   Endocrine: Negative for heat intolerance.   Genitourinary:  Negative for difficulty urinating, dysuria, flank pain, frequency, genital sores, hematuria and urgency.   Musculoskeletal:  Negative for arthralgias, back pain and joint swelling.   Skin:  Negative for rash.   Allergic/Immunologic: Negative for environmental allergies and food allergies.   Neurological:  Negative for headaches.   Hematological:  Negative for adenopathy. Does not bruise/bleed easily.   Psychiatric/Behavioral:  Negative for decreased concentration and dysphoric mood. The patient is not nervous/anxious.          Objective:      Ht 5' 4\" (1.626 m)   Wt 56.2 kg (123 lb 12.8 oz)   BMI 21.25 kg/m²          Physical Exam  Vitals and nursing note reviewed. Exam conducted with a chaperone present.   Constitutional:       General: She is not in acute distress.     Appearance: Normal appearance. She is not ill-appearing.   HENT:      Head: Normocephalic and atraumatic.      Nose: Nose normal.      Mouth/Throat:      Mouth: Mucous membranes are moist.      Pharynx: Oropharynx is clear.   Eyes:      Extraocular Movements: Extraocular movements intact.   Cardiovascular:      Rate and Rhythm: Normal rate and regular rhythm.      Pulses: Normal pulses.      Heart sounds: Normal heart sounds. No murmur heard.     No friction rub.   Pulmonary:      Effort: Pulmonary effort is normal.      Breath sounds: Normal breath sounds.   Chest:   Breasts:     Right: Normal. No mass or tenderness.      Left: Normal. No mass or tenderness.   Abdominal:      General: Abdomen is flat. Bowel sounds are normal. There is no distension.      Palpations: Abdomen is soft. There is no mass.      Tenderness: There is no abdominal tenderness. There is no guarding.      Hernia: No hernia is present.   Genitourinary:     General: Normal vulva.      Exam position: Lithotomy position.      Guero stage (genital): 5.      " Labia:         Right: No rash, tenderness or lesion.         Left: No rash, tenderness or lesion.       Vagina: Normal.      Uterus: Absent.       Adnexa: Right adnexa normal and left adnexa normal.        Right: No mass or tenderness.          Left: No mass.     Musculoskeletal:      Cervical back: Normal range of motion and neck supple.   Lymphadenopathy:      Upper Body:      Right upper body: No axillary adenopathy.      Left upper body: No axillary adenopathy.   Skin:     General: Skin is warm and dry.   Neurological:      General: No focal deficit present.      Mental Status: She is alert and oriented to person, place, and time.   Psychiatric:         Mood and Affect: Mood normal.         Behavior: Behavior normal.         Thought Content: Thought content normal.         Judgment: Judgment normal.

## 2024-03-12 ENCOUNTER — APPOINTMENT (OUTPATIENT)
Dept: LAB | Facility: CLINIC | Age: 59
End: 2024-03-12
Payer: COMMERCIAL

## 2024-03-12 ENCOUNTER — TELEPHONE (OUTPATIENT)
Dept: ENDOCRINOLOGY | Facility: CLINIC | Age: 59
End: 2024-03-12

## 2024-03-12 DIAGNOSIS — E55.9 VITAMIN D DEFICIENCY: ICD-10-CM

## 2024-03-12 DIAGNOSIS — E78.2 MIXED HYPERLIPIDEMIA: ICD-10-CM

## 2024-03-12 LAB
25(OH)D3 SERPL-MCNC: 35.2 NG/ML (ref 30–100)
ALBUMIN SERPL BCP-MCNC: 4.1 G/DL (ref 3.5–5)
ALP SERPL-CCNC: 62 U/L (ref 34–104)
ALT SERPL W P-5'-P-CCNC: 40 U/L (ref 7–52)
ANION GAP SERPL CALCULATED.3IONS-SCNC: 6 MMOL/L
AST SERPL W P-5'-P-CCNC: 37 U/L (ref 13–39)
BILIRUB SERPL-MCNC: 0.7 MG/DL (ref 0.2–1)
BUN SERPL-MCNC: 16 MG/DL (ref 5–25)
CALCIUM SERPL-MCNC: 9.2 MG/DL (ref 8.4–10.2)
CHLORIDE SERPL-SCNC: 102 MMOL/L (ref 96–108)
CHOLEST SERPL-MCNC: 187 MG/DL
CO2 SERPL-SCNC: 29 MMOL/L (ref 21–32)
CREAT SERPL-MCNC: 0.7 MG/DL (ref 0.6–1.3)
ERYTHROCYTE [DISTWIDTH] IN BLOOD BY AUTOMATED COUNT: 12.6 % (ref 11.6–15.1)
GFR SERPL CREATININE-BSD FRML MDRD: 95 ML/MIN/1.73SQ M
GLUCOSE P FAST SERPL-MCNC: 91 MG/DL (ref 65–99)
HCT VFR BLD AUTO: 40.3 % (ref 34.8–46.1)
HDLC SERPL-MCNC: 63 MG/DL
HGB BLD-MCNC: 13.2 G/DL (ref 11.5–15.4)
LDLC SERPL CALC-MCNC: 108 MG/DL (ref 0–100)
MCH RBC QN AUTO: 30.9 PG (ref 26.8–34.3)
MCHC RBC AUTO-ENTMCNC: 32.8 G/DL (ref 31.4–37.4)
MCV RBC AUTO: 94 FL (ref 82–98)
NONHDLC SERPL-MCNC: 124 MG/DL
PLATELET # BLD AUTO: 150 THOUSANDS/UL (ref 149–390)
PMV BLD AUTO: 10 FL (ref 8.9–12.7)
POTASSIUM SERPL-SCNC: 3.6 MMOL/L (ref 3.5–5.3)
PROT SERPL-MCNC: 6.7 G/DL (ref 6.4–8.4)
RBC # BLD AUTO: 4.27 MILLION/UL (ref 3.81–5.12)
SODIUM SERPL-SCNC: 137 MMOL/L (ref 135–147)
TRIGL SERPL-MCNC: 79 MG/DL
WBC # BLD AUTO: 4.26 THOUSAND/UL (ref 4.31–10.16)

## 2024-03-12 PROCEDURE — 80053 COMPREHEN METABOLIC PANEL: CPT

## 2024-03-12 PROCEDURE — 80061 LIPID PANEL: CPT

## 2024-03-12 PROCEDURE — 82306 VITAMIN D 25 HYDROXY: CPT

## 2024-03-12 PROCEDURE — 85027 COMPLETE CBC AUTOMATED: CPT

## 2024-03-12 PROCEDURE — 36415 COLL VENOUS BLD VENIPUNCTURE: CPT

## 2024-03-13 DIAGNOSIS — E03.9 ACQUIRED HYPOTHYROIDISM: Primary | ICD-10-CM

## 2024-03-18 ENCOUNTER — TELEPHONE (OUTPATIENT)
Age: 59
End: 2024-03-18

## 2024-03-19 ENCOUNTER — APPOINTMENT (OUTPATIENT)
Dept: LAB | Facility: CLINIC | Age: 59
End: 2024-03-19
Payer: COMMERCIAL

## 2024-03-19 DIAGNOSIS — E03.9 ACQUIRED HYPOTHYROIDISM: ICD-10-CM

## 2024-03-19 LAB
T4 FREE SERPL-MCNC: 0.91 NG/DL (ref 0.61–1.12)
TSH SERPL DL<=0.05 MIU/L-ACNC: 1.43 UIU/ML (ref 0.45–4.5)

## 2024-03-19 PROCEDURE — 84443 ASSAY THYROID STIM HORMONE: CPT

## 2024-03-19 PROCEDURE — 84439 ASSAY OF FREE THYROXINE: CPT

## 2024-03-19 PROCEDURE — 36415 COLL VENOUS BLD VENIPUNCTURE: CPT

## 2024-03-25 ENCOUNTER — OFFICE VISIT (OUTPATIENT)
Dept: ENDOCRINOLOGY | Facility: CLINIC | Age: 59
End: 2024-03-25
Payer: COMMERCIAL

## 2024-03-25 VITALS
TEMPERATURE: 99 F | DIASTOLIC BLOOD PRESSURE: 78 MMHG | WEIGHT: 127 LBS | BODY MASS INDEX: 21.68 KG/M2 | HEART RATE: 66 BPM | HEIGHT: 64 IN | OXYGEN SATURATION: 99 % | SYSTOLIC BLOOD PRESSURE: 120 MMHG

## 2024-03-25 DIAGNOSIS — E03.9 ACQUIRED HYPOTHYROIDISM: Primary | ICD-10-CM

## 2024-03-25 DIAGNOSIS — R00.2 PALPITATIONS: ICD-10-CM

## 2024-03-25 PROCEDURE — 99213 OFFICE O/P EST LOW 20 MIN: CPT | Performed by: INTERNAL MEDICINE

## 2024-03-25 RX ORDER — BUSPIRONE HYDROCHLORIDE 5 MG/1
5 TABLET ORAL 2 TIMES DAILY
COMMUNITY
Start: 2024-03-12

## 2024-03-25 NOTE — ASSESSMENT & PLAN NOTE
Avoid Cytomel in future.  Consider small dose beta-blocker.  Note polypharmacy which can contribute to palpitations..  
She seems to be in great control with most recent TSH and free T4 within normal limits.  She is on Synthroid 100 mcg daily which is roughly 1.6 mcg/kg body weight dosing.  We agreed to not make any changes at this time.  Repeat lipid labs prior to next visit in 6 to 12 months.  
Incorporate Mauc In Note: Yes
Detail Level: Detailed
Name Of The Referring Provider For Procedure: Rosibel Babb MD
X Size Of Lesion In Cm (Optional): 0.6
Size Of Lesion: 1.2

## 2024-04-25 DIAGNOSIS — E03.9 ACQUIRED HYPOTHYROIDISM: ICD-10-CM

## 2024-04-25 RX ORDER — LEVOTHYROXINE SODIUM 100 MCG
100 TABLET ORAL DAILY
Qty: 90 TABLET | Refills: 1 | Status: SHIPPED | OUTPATIENT
Start: 2024-04-25

## 2024-05-09 ENCOUNTER — CONSULT (OUTPATIENT)
Dept: VASCULAR SURGERY | Facility: CLINIC | Age: 59
End: 2024-05-09
Payer: COMMERCIAL

## 2024-05-09 VITALS
HEART RATE: 73 BPM | WEIGHT: 126.8 LBS | BODY MASS INDEX: 21.65 KG/M2 | HEIGHT: 64 IN | SYSTOLIC BLOOD PRESSURE: 102 MMHG | OXYGEN SATURATION: 98 % | DIASTOLIC BLOOD PRESSURE: 84 MMHG

## 2024-05-09 DIAGNOSIS — I78.1 SPIDER VEINS: ICD-10-CM

## 2024-05-09 PROCEDURE — 99203 OFFICE O/P NEW LOW 30 MIN: CPT | Performed by: NURSE PRACTITIONER

## 2024-05-09 NOTE — ASSESSMENT & PLAN NOTE
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  She presents to the office to discuss injection sclerotherapy    Prior hx of injection sclerotherapy years ago by Dr Gooden with sub optimal results, reports veins came back right away. She then went on to have tattoos of the right anterior lateral thigh to cover telangiectasias.  Has since had laser tattoo removal and would like to proceed with sclero treatment     -Area of concern right anterior lateral thigh  -We discussed injection sclerotherapy including pain associated with injection, cost, risk of a permanent hyperpigmentation, need to wear compression stockings after injection sclerotherapy  -Response may not be ideal due to underlying skin inflammation and damage from prior laser tattoo removal  -Will try 1 session of sclerotherapy in the fall  -For scheduling: A sclera 0.5% solution, 6 mL, 60 minutes, $500.  Will recall for scheduling  -Scleral packet given  -Rx thigh-high compression stocking given

## 2024-05-09 NOTE — PROGRESS NOTES
Assessment/Plan:    Spider veins  59 year old female with bilateral lower extremity spider telangiectasias.  She presents to the office to discuss injection sclerotherapy    Prior hx of injection sclerotherapy years ago by Dr Gooden with sub optimal results, reports veins came back right away. She then went on to have tattoos of the right anterior lateral thigh to cover telangiectasias.  Has since had laser tattoo removal and would like to proceed with sclero treatment     -Area of concern right anterior lateral thigh  -We discussed injection sclerotherapy including pain associated with injection, cost, risk of a permanent hyperpigmentation, need to wear compression stockings after injection sclerotherapy  -Response may not be ideal due to underlying skin inflammation and damage from prior laser tattoo removal  -Will try 1 session of sclerotherapy in the fall  -For scheduling: A sclera 0.5% solution, 6 mL, 60 minutes, $500.  Will recall for scheduling  -Scleral packet given  -Rx thigh-high compression stocking given       Diagnoses and all orders for this visit:    Spider veins  -     Ambulatory Referral to Vascular Surgery  -     Compression Stocking      Subjective:      Patient ID: Swetha Zuniga is a 59 y.o. female.    Pt presents new to the office for spider veins R thigh area as a self referral. Pt denies swelling, heaviness and fatigue. Pt does not wear compression stockings and does not elevate legs. She is not on pert meds and is a nonsmoker.     HPI  59 year old female with bilateral lower extremity spider telangiectasias.  She presents to the office to discuss injection sclerotherapy  Prior hx of injection sclerotherapy years ago by Dr Gooden with sub optimal results, reports veins came back right away. She then went on to have tattoos of the right anterior lateral thigh to cover telangiectasias.  Has since had laser tattoo removal and would like to proceed with sclero treatment..  Area of  "concern right anterior lateral thigh.  No large truncal varicosities.  No bleeding.  No venous stasis changes  The following portions of the patient's history were reviewed and updated as appropriate: allergies, current medications, past family history, past medical history, past social history, past surgical history, and problem list.  ROS reviewed     Review of Systems   Constitutional: Negative.    HENT: Negative.     Eyes: Negative.    Respiratory: Negative.     Cardiovascular: Negative.    Gastrointestinal: Negative.    Endocrine: Negative.    Genitourinary: Negative.    Musculoskeletal: Negative.    Skin: Negative.    Allergic/Immunologic: Negative.    Neurological: Negative.    Hematological: Negative.    Psychiatric/Behavioral: Negative.           Objective:    I have reviewed and made appropriate changes to the review of systems input by the medical assistant.    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    Hallux valgus, right    Constipation    Bloating    Symptomatic spider varicose vein    Mild intermittent asthma without complication    Acute pain of left knee    Spider veins       Past Surgical History:   Procedure Laterality Date    ABDOMINAL ADHESION SURGERY      APPENDECTOMY      AUGMENTATION MAMMAPLASTY       SECTION  , ,     ECTOPIC PREGNANCY SURGERY      HYSTERECTOMY      partial, still have ovaries     OTHER SURGICAL HISTORY      abdominal lysis with a band put in at age 43    WV EXC B9 LESION MRGN XCP SK TG S/N/H/F/G > 4.0CM " N/A 2020    Procedure: CORRECTION UNFAVORABLE SCARRING/STANDING CONES CHIN AND NECK;  Surgeon: Gilberto Ramirez MD;  [facility];  Service: Plastics    FL EXCISION INTERDIGITAL LINTNO NEUROMA SINGLE EACH Left 2019    Procedure: EXCISION NEUROMA MORTONS;  Surgeon: James R Lachman, MD;  [facility];  Service: Orthopedics    FL REPAIR COMPLEX F/C/C/M/N/AX/G/H/F 2.6-7.5 CM N/A 2020    Procedure: COMPLEX CLOSURE CHIN;  Surgeon: Gilberto Ramirez MD;  [facility];  Service: Plastics       Family History   Problem Relation Age of Onset    Hypertension Father     No Known Problems Sister     No Known Problems Sister     No Known Problems Maternal Grandmother     Stroke Maternal Grandfather     No Known Problems Paternal Grandmother     Prostate cancer Brother     Emphysema Paternal Uncle     Prostate cancer Other         runs strongly on father's side of family     No Known Problems Maternal Aunt     No Known Problems Maternal Aunt     No Known Problems Paternal Aunt        Social History     Socioeconomic History    Marital status: /Civil Union     Spouse name: Moy    Number of children: 3    Years of education: Not on file    Highest education level: Not on file   Occupational History    Occupation: teacher   Tobacco Use    Smoking status: Never    Smokeless tobacco: Never   Vaping Use    Vaping status: Never Used   Substance and Sexual Activity    Alcohol use: Yes     Alcohol/week: 1.0 - 2.0 standard drink of alcohol     Types: 1 - 2 Glasses of wine per week     Comment: socially    Drug use: No    Sexual activity: Yes     Partners: Male   Other Topics Concern    Not on file   Social History Narrative    Most recent tobacco use screenin2019      Do you currently or have you served in the  [employer]:   No      Caffeine intake:   Occasional  2 cups of coffee in AM     Guns present in home:   No      Seat belts used routinely:   Yes      Sunscreen  used routinely:   Yes      Smoke alarm in home:   Yes      Advance directive:   Yes      Live alone or with others:   with others      Are there stairs in your home:   Yes      Pets:   Yes  dog      Social Determinants of Health     Financial Resource Strain: Not on file   Food Insecurity: Not on file   Transportation Needs: Not on file   Physical Activity: Not on file   Stress: Not on file   Social Connections: Not on file   Intimate Partner Violence: Not on file   Housing Stability: Not on file       No Known Allergies      Current Outpatient Medications:     ascorbic Acid (VITAMIN C) 500 MG CPCR, Take 1 capsule by mouth daily, Disp: , Rfl:     B Complex Vitamins (VITAMIN B COMPLEX PO), Take 1 capsule by mouth daily in the early morning  , Disp: , Rfl:     B Complex-C CAPS, Take 1 capsule by mouth daily, Disp: , Rfl:     buPROPion (WELLBUTRIN SR) 100 mg 12 hr tablet, TAKE 1 TABLET BY MOIUTH TWICE DAILY, Disp: , Rfl: 0    busPIRone (BUSPAR) 5 mg tablet, Take 5 mg by mouth 2 (two) times a day, Disp: , Rfl:     Calcium Carbonate (CALTRATE 600 PO), Take 1 capsule by mouth daily in the early morning  , Disp: , Rfl:     Coenzyme Q10 (CO Q 10) 100 MG CAPS, Take 1 capsule by mouth daily at bedtime  , Disp: , Rfl:     levocetirizine (XYZAL) 5 MG tablet, Take 5 mg by mouth daily as needed for allergies , Disp: , Rfl:     NON FORMULARY, Take 1 capsule by mouth every other day DMAE , Disp: , Rfl:     NON FORMULARY, Take 1 capsule by mouth daily Cayden Niagen, Disp: , Rfl:     scopolamine (TRANSDERM-SCOP) 1 mg/3 days TD 72 hr patch, Place 1 patch on the skin every third day, Disp: , Rfl:     Synthroid 100 MCG tablet, Take 1 tablet (100 mcg total) by mouth daily, Disp: 90 tablet, Rfl: 1    Vitamin Mixture (PRANAV-C PO), Take 1,000 mg by mouth daily, Disp: , Rfl:     clindamycin (CLEOCIN T) 1 % lotion, APPLY TO FACE TWICE A DAY (Patient not taking: Reported on 2/20/2024), Disp: , Rfl:     Soolantra 1 % CREA, APPLY TO THE FACE AT  "BEDTIME (Patient not taking: Reported on 3/25/2024), Disp: , Rfl:     /84 (BP Location: Left arm, Patient Position: Sitting, Cuff Size: Standard)   Pulse 73   Ht 5' 4\" (1.626 m)   Wt 57.5 kg (126 lb 12.8 oz)   SpO2 98%   BMI 21.77 kg/m²          Physical Exam  Vitals and nursing note reviewed.   Constitutional:       Appearance: Normal appearance. She is normal weight.   Eyes:      Extraocular Movements: Extraocular movements intact.   Cardiovascular:      Pulses: Normal pulses.      Heart sounds: Normal heart sounds.   Pulmonary:      Effort: Pulmonary effort is normal.      Breath sounds: Normal breath sounds.   Musculoskeletal:         General: No swelling. Normal range of motion.      Comments: No large truncal varicosities.  No venous stasis changes.  Scattered bilateral lower extremity spider telangiectasias with focal area spider/reticular veins right anterior lateral thigh   Neurological:      General: No focal deficit present.      Mental Status: She is alert and oriented to person, place, and time.   Psychiatric:         Mood and Affect: Mood normal.         Behavior: Behavior normal.               "

## 2024-05-15 ENCOUNTER — OFFICE VISIT (OUTPATIENT)
Dept: PULMONOLOGY | Facility: CLINIC | Age: 59
End: 2024-05-15
Payer: COMMERCIAL

## 2024-05-15 ENCOUNTER — APPOINTMENT (OUTPATIENT)
Dept: LAB | Facility: CLINIC | Age: 59
End: 2024-05-15
Payer: COMMERCIAL

## 2024-05-15 VITALS
BODY MASS INDEX: 21.85 KG/M2 | TEMPERATURE: 96.1 F | SYSTOLIC BLOOD PRESSURE: 118 MMHG | HEIGHT: 64 IN | HEART RATE: 57 BPM | DIASTOLIC BLOOD PRESSURE: 74 MMHG | WEIGHT: 128 LBS | OXYGEN SATURATION: 99 %

## 2024-05-15 DIAGNOSIS — J45.40 MODERATE PERSISTENT EXTRINSIC ASTHMA WITHOUT COMPLICATION: ICD-10-CM

## 2024-05-15 DIAGNOSIS — J45.40 MODERATE PERSISTENT EXTRINSIC ASTHMA WITHOUT COMPLICATION: Primary | ICD-10-CM

## 2024-05-15 DIAGNOSIS — R05.3 CHRONIC COUGH: ICD-10-CM

## 2024-05-15 DIAGNOSIS — E03.9 ACQUIRED HYPOTHYROIDISM: ICD-10-CM

## 2024-05-15 LAB
BASOPHILS # BLD AUTO: 0.04 THOUSANDS/ÂΜL (ref 0–0.1)
BASOPHILS NFR BLD AUTO: 1 % (ref 0–1)
EOSINOPHIL # BLD AUTO: 0.1 THOUSAND/ÂΜL (ref 0–0.61)
EOSINOPHIL NFR BLD AUTO: 2 % (ref 0–6)
ERYTHROCYTE [DISTWIDTH] IN BLOOD BY AUTOMATED COUNT: 11.7 % (ref 11.6–15.1)
HCT VFR BLD AUTO: 40.6 % (ref 34.8–46.1)
HGB BLD-MCNC: 13.3 G/DL (ref 11.5–15.4)
IMM GRANULOCYTES # BLD AUTO: 0.01 THOUSAND/UL (ref 0–0.2)
IMM GRANULOCYTES NFR BLD AUTO: 0 % (ref 0–2)
LYMPHOCYTES # BLD AUTO: 1.61 THOUSANDS/ÂΜL (ref 0.6–4.47)
LYMPHOCYTES NFR BLD AUTO: 38 % (ref 14–44)
MCH RBC QN AUTO: 31.7 PG (ref 26.8–34.3)
MCHC RBC AUTO-ENTMCNC: 32.8 G/DL (ref 31.4–37.4)
MCV RBC AUTO: 97 FL (ref 82–98)
MONOCYTES # BLD AUTO: 0.46 THOUSAND/ÂΜL (ref 0.17–1.22)
MONOCYTES NFR BLD AUTO: 11 % (ref 4–12)
NEUTROPHILS # BLD AUTO: 2.03 THOUSANDS/ÂΜL (ref 1.85–7.62)
NEUTS SEG NFR BLD AUTO: 48 % (ref 43–75)
NRBC BLD AUTO-RTO: 0 /100 WBCS
PLATELET # BLD AUTO: 175 THOUSANDS/UL (ref 149–390)
PMV BLD AUTO: 10 FL (ref 8.9–12.7)
RBC # BLD AUTO: 4.19 MILLION/UL (ref 3.81–5.12)
T4 FREE SERPL-MCNC: 0.77 NG/DL (ref 0.61–1.12)
TSH SERPL DL<=0.05 MIU/L-ACNC: 0.66 UIU/ML (ref 0.45–4.5)
WBC # BLD AUTO: 4.25 THOUSAND/UL (ref 4.31–10.16)

## 2024-05-15 PROCEDURE — 84443 ASSAY THYROID STIM HORMONE: CPT

## 2024-05-15 PROCEDURE — 95012 NITRIC OXIDE EXP GAS DETER: CPT | Performed by: INTERNAL MEDICINE

## 2024-05-15 PROCEDURE — 85025 COMPLETE CBC W/AUTO DIFF WBC: CPT

## 2024-05-15 PROCEDURE — 86003 ALLG SPEC IGE CRUDE XTRC EA: CPT

## 2024-05-15 PROCEDURE — 99214 OFFICE O/P EST MOD 30 MIN: CPT | Performed by: INTERNAL MEDICINE

## 2024-05-15 PROCEDURE — 82785 ASSAY OF IGE: CPT

## 2024-05-15 PROCEDURE — 36415 COLL VENOUS BLD VENIPUNCTURE: CPT

## 2024-05-15 PROCEDURE — 84439 ASSAY OF FREE THYROXINE: CPT

## 2024-05-15 RX ORDER — FLUTICASONE FUROATE AND VILANTEROL 200; 25 UG/1; UG/1
POWDER RESPIRATORY (INHALATION)
COMMUNITY
Start: 2024-04-08

## 2024-05-15 NOTE — PROGRESS NOTES
Pulmonary Initial Visit  Swetha Zuniga 59 y.o. female MRN: [id number]  @ Encounter: [id number]      Impressions/Recommendations:   Patient is a 59-year-old who presents for initial evaluation of chronic cough.  The patient reports initial onset of the cough was in the fall 2023.  She denies any significant inciting event.  The patient does report that she is on prescription strength Xyzal and does have allergies.  She has cats and dogs in her house for which is seems like she is allergic but she denies any history of asthma.  The etiology of the cough is a bit unclear at this time.  The patient denies any significant acid reflux.  She has only tried Mucinex.  The patient had a Vern which was within normal range.  At the current time, we will check a [facility] allergy panel, CBC with differential to evaluate for eosinophils.  The patient has Breo at home currently from a previous bronchitis which she may use.  If this works for her she may call for refill.  The patient also benefit from a chest x-ray..    Allergic asthma  -  check [place] allergy panel; CBC w/ differential  -  check FeNO  -  start Breo 200  -  will hold on prednisone for now    Chronic cough  -  check CXR  -  hold CT and PFTs    Patient may follow-up in 3 to 4 months or sooner as necessary.    Orders Placed This Encounter   Procedures    XR chest pa & lateral     Standing Status:   Future     Standing Expiration Date:   5/15/2028     Scheduling Instructions:      Bring along any outside films relating to this procedure.           Order Specific Question:   Is the patient pregnant?     Answer:   No    CBC and differential     This is a patient instruction: This test is non-fasting. Please drink two glasses of water morning of bloodwork.        Standing Status:   Future     Number of Occurrences:   1     Standing Expiration Date:   5/15/2025    [facility] Allergy Panel, Adult     Standing Status:   Future     Number of Occurrences:   1      Standing Expiration Date:   5/15/2025    POCT FeNO       History of Present Illness   HPI:  Swetha Zuniga is a 59 y.o. female with pmhx sig for hypothyroidism who presents to establish care in setting of chronic cough..    The patient had a chronic cough which started last fall.  She had no precipitating event.  She has had bronchitis but denies the cough has been this persistent. She has tried mucinex with minimal benefit.  She takes xyzal which she has been on for about 5 years since her daughter brought home a cat. She had hypoallergenic dog.  She has an [place] bal dog.    She notes seasonal allergies.      She has an unopened Breo at home which she had for bronchitis.    She denies tobacco use, has occasional wine.  She denies illicit drug use.    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 She denies clear mold/dust/gas/fumes.      She denies family history of lung disease. She denies family history of asthma.    She is in menopause.  She notes her weight is stable.      The cough was initially waking her from sleep at night.  The cough has decreased in intensity.  She denies any clear/obvious triggers.      She denies any shortness of breath.  She does not notice her cough while working out.      She denies acid reflux.      She has no personal history of cancer.      Review of systems:  Review of systems was completed and was otherwise negative except as listed in HPI.    Historical Information   Past Medical History:   Diagnosis Date    Arthritis     COVID-19 2020    Depression     Disease of thyroid gland     Heart palpitations     Hypothyroidism     PONV (postoperative nausea and vomiting)      Past Surgical History:   Procedure Laterality Date    ABDOMINAL ADHESION SURGERY      APPENDECTOMY      AUGMENTATION MAMMAPLASTY       SECTION  , ,     ECTOPIC PREGNANCY SURGERY      HYSTERECTOMY  2006    partial, still have ovaries      OTHER SURGICAL HISTORY      abdominal lysis with a band put in at age 43    MS EXC B9 LESION MRGN XCP SK TG S/N/H/F/G > 4.0CM N/A 2020    Procedure: CORRECTION UNFAVORABLE SCARRING/STANDING CONES CHIN AND NECK;  Surgeon: Gilberto Ramirez MD;  [facility];  Service: Plastics    MS EXCISION INTERDIGITAL LINTON NEUROMA SINGLE EACH Left 2019    Procedure: EXCISION NEUROMA MORTONS;  Surgeon: James R Lachman, MD;  [facility];  Service: Orthopedics    MS REPAIR COMPLEX F/C/C/M/N/AX/G/H/F 2.6-7.5 CM N/A 2020    Procedure: COMPLEX CLOSURE CHIN;  Surgeon: Gilberto Ramirez MD;  [facility];  Service: Plastics     Family History   Problem Relation Age of Onset    Hypertension Father     No Known Problems Sister     No Known Problems Sister     No Known Problems Maternal Grandmother     Stroke Maternal Grandfather     No Known Problems Paternal Grandmother     Prostate cancer Brother     Emphysema Paternal Uncle     Prostate cancer Other         runs strongly on father's side of family     No Known Problems Maternal Aunt     No Known Problems Maternal Aunt     No Known Problems Paternal Aunt        Social History     Socioeconomic History    Marital status: /Civil Union     Spouse name: Moy    Number of children: 3    Years of education: None    Highest education level: None   Occupational History    Occupation: teacher   Tobacco Use    Smoking status: Never    Smokeless tobacco: Never   Vaping Use    Vaping status: Never Used   Substance and Sexual Activity    Alcohol use: Yes     Alcohol/week: 1.0 - 2.0 standard drink of alcohol     Types: 1 - 2 Glasses of wine per week     Comment: socially    Drug use: No    Sexual activity: Yes     Partners: Male   Other Topics Concern    None   Social History Narrative    Most recent tobacco use screenin2019      Do you currently or have you served in the [employer]:   No      Caffeine intake:   Occasional  2 cups  "of coffee in AM     Guns present in home:   No      Seat belts used routinely:   Yes      Sunscreen used routinely:   Yes      Smoke alarm in home:   Yes      Advance directive:   Yes      Live alone or with others:   with others      Are there stairs in your home:   Yes      Pets:   Yes  dog      Social Determinants of Health     Financial Resource Strain: Not on file   Food Insecurity: Not on file   Transportation Needs: Not on file   Physical Activity: Not on file   Stress: Not on file   Social Connections: Not on file   Intimate Partner Violence: Not on file   Housing Stability: Not on file       Meds/Allergies   No current facility-administered medications for this visit.     Not in a hospital admission.  No Known Allergies    Vitals: Blood pressure 118/74, pulse 57, temperature (!) 96.1 °F (35.6 °C), height 5' 4\" (1.626 m), weight 58.1 kg (128 lb), SpO2 99%., RA, Body mass index is 21.97 kg/m².    Physical Exam  General: Pleasant, Awake alert and oriented x 3, conversant without conversational dyspnea, NAD, normal affect  HEENT:  PERRL, Sclera noninjected, nonicteric OU, Nares patent, no nasal flaring, no nasal drainage, Mucous membranes, moist, no oral lesions, normal dentition  NECK: Trachea midline, no accessory muscle use, no stridor, no cervical or supraclavicular adenopathy, JVP not elevated  CARDIAC: Reg, single s1/S2, no m/r/g  PULM: CTA b/l  CHEST: No gross deformities, equal chest expansion on inspiration bilaterally  ABD: Normoactive bowel sounds, soft nontender, nondistended, no rebound, no rigidity, no guarding  EXT: No cyanosis, no clubbing, no edema, normal capillary refill  SKIN:  No rashes, no lesions  NEURO: no focal neurologic deficits, AAOx3, moving all extremities appropriately    Labs: I have personally reviewed pertinent lab results.  Lab Results   Component Value Date    SODIUM 137 03/12/2024    K 3.6 03/12/2024     03/12/2024    CO2 29 03/12/2024    BUN 16 03/12/2024    " CREATININE 0.70 03/12/2024    GLUC 75 10/21/2020    CALCIUM 9.2 03/12/2024     Lab Results   Component Value Date    WBC 4.26 (L) 03/12/2024    HGB 13.2 03/12/2024    HCT 40.3 03/12/2024    MCV 94 03/12/2024     03/12/2024     Imaging and other studies: I have personally reviewed pertinent reports.    CXR 7/9/2021:  Clinical history is chronic cough. 2 views of the chest. No comparisons are   present. The heart and mediastinum are normal. There is no evidence of pneumonia   or pleural effusion. The pulmonary vasculature is within normal limits.     Pulmonary function testing:    No pulmonary function testing available for review.    Echocardiogram:   No echocardiogram available for review.     EKG, Pathology, and Other Studies: I have personally reviewed pertinent films in PACS  1/29/2019:  sinus bradycardia    Referring:  Caron Darnell MD  [address] [facility] [address]  [address]  [place] [address]    Alvin Leary MD  [facility]

## 2024-05-20 ENCOUNTER — TELEPHONE (OUTPATIENT)
Age: 59
End: 2024-05-20

## 2024-05-20 LAB
A ALTERNATA IGE QN: <0.1 KUA/I
A FUMIGATUS IGE QN: <0.1 KUA/I
BERMUDA GRASS IGE QN: <0.1 KUA/I
BOXELDER IGE QN: <0.1 KUA/I
C HERBARUM IGE QN: <0.1 KUA/I
CAT DANDER IGE QN: 2.96 KUA/I
CMN PIGWEED IGE QN: <0.1 KUA/I
COMMON RAGWEED IGE QN: 0.13 KUA/I
COTTONWOOD IGE QN: <0.1 KUA/I
D FARINAE IGE QN: 0.41 KUA/I
D PTERONYSS IGE QN: <0.1 KUA/I
DOG DANDER IGE QN: 2.83 KUA/I
LONDON PLANE IGE QN: <0.1 KUA/I
MOUSE URINE PROT IGE QN: <0.1 KUA/I
MT JUNIPER IGE QN: <0.1 KUA/I
MUGWORT IGE QN: <0.1 KUA/I
P NOTATUM IGE QN: <0.1 KUA/I
ROACH IGE QN: <0.1 KUA/I
SHEEP SORREL IGE QN: <0.1 KUA/I
SILVER BIRCH IGE QN: 2.36 KUA/I
TIMOTHY IGE QN: <0.1 KUA/I
TOTAL IGE SMQN RAST: 42 KU/L (ref 0–113)
WALNUT IGE QN: 0.35 KUA/I
WHITE ASH IGE QN: <0.1 KUA/I
WHITE ELM IGE QN: <0.1 KUA/I
WHITE MULBERRY IGE QN: <0.1 KUA/I
WHITE OAK IGE QN: 1.01 KUA/I

## 2024-05-20 NOTE — TELEPHONE ENCOUNTER
Pt had question regarding WBC, she was wondering if she needed to follow a specialist. Per Dr. Leary's note the WBC has stable for past 5 yrs, and unchanged. No further evaluation is needed.     Pt relieved was concerned but know is aware that it's okay.

## 2024-06-07 ENCOUNTER — HOSPITAL ENCOUNTER (OUTPATIENT)
Dept: RADIOLOGY | Facility: HOSPITAL | Age: 59
Discharge: HOME | End: 2024-06-07
Payer: COMMERCIAL

## 2024-06-07 DIAGNOSIS — R05.3 CHRONIC COUGH: ICD-10-CM

## 2024-06-07 PROCEDURE — 71046 X-RAY EXAM CHEST 2 VIEWS: CPT

## 2024-06-11 ENCOUNTER — TELEPHONE (OUTPATIENT)
Age: 59
End: 2024-06-11

## 2024-06-11 NOTE — TELEPHONE ENCOUNTER
Spoke with pt made her aware of imaging results. Pt is glad to hear that there is no concerning findings. Pt will plan to continue therapy and follow up as planned.       Pt did want to report to provider that she fights the urge to cough. She is trying her best and hopes it goes away soon.

## 2024-06-11 NOTE — TELEPHONE ENCOUNTER
Patient called in returning Heather call in regarding to her xray results , warm transfer to pul cts

## 2024-06-18 ENCOUNTER — APPOINTMENT (OUTPATIENT)
Dept: LAB | Facility: HOSPITAL | Age: 59
End: 2024-06-18

## 2024-06-18 DIAGNOSIS — Z00.6 ENCOUNTER FOR EXAMINATION FOR NORMAL COMPARISON OR CONTROL IN CLINICAL RESEARCH PROGRAM: ICD-10-CM

## 2024-06-18 PROCEDURE — 36415 COLL VENOUS BLD VENIPUNCTURE: CPT

## 2024-07-09 ENCOUNTER — HOSPITAL ENCOUNTER (OUTPATIENT)
Facility: HOSPITAL | Age: 59
Discharge: HOME | End: 2024-07-09
Payer: COMMERCIAL

## 2024-07-09 DIAGNOSIS — Z12.31 ENCOUNTER FOR SCREENING MAMMOGRAM FOR MALIGNANT NEOPLASM OF BREAST: ICD-10-CM

## 2024-07-09 PROCEDURE — 77063 BREAST TOMOSYNTHESIS BI: CPT

## 2024-07-09 PROCEDURE — 77067 SCR MAMMO BI INCL CAD: CPT

## 2024-07-11 NOTE — RESULT ENCOUNTER NOTE
Fanny Posada,     Your mammogram results came back normal. Please repeat in one year.     If you have any questions or concerns please call the office here at [phone].     Thank you,   [facility]

## 2024-08-08 ENCOUNTER — TELEPHONE (OUTPATIENT)
Dept: PULMONOLOGY | Facility: CLINIC | Age: 59
End: 2024-08-08

## 2024-08-08 NOTE — TELEPHONE ENCOUNTER
LEFT MESSAGE TO SEE IF WE CAN SWITCH HIS 8/20 APPT TO 8/19. NO ANSWER. LEFT MESSAGE EXPLAINING FOR CALL

## 2024-08-19 ENCOUNTER — TELEPHONE (OUTPATIENT)
Age: 59
End: 2024-08-19

## 2024-08-19 NOTE — TELEPHONE ENCOUNTER
Patient calling very upset her appointment had been cancelled with Dr. Leary for 8/20 [place] and [place]/s within the next week.  The patient stated she did not received any message as to her appointment being cancelled,I offered the patient today's appointment at 11:40, but patient is at work and unable to make that appointment    Patient requesting a call back today.  Patient would also like to know why her appointment was cancelled

## 2024-09-11 LAB
APOB+LDLR+PCSK9 GENE MUT ANL BLD/T: NOT DETECTED
BRCA1+BRCA2 DEL+DUP + FULL MUT ANL BLD/T: NOT DETECTED
MLH1+MSH2+MSH6+PMS2 GN DEL+DUP+FUL M: NOT DETECTED

## 2024-10-18 DIAGNOSIS — E03.9 ACQUIRED HYPOTHYROIDISM: ICD-10-CM

## 2024-10-18 RX ORDER — LEVOTHYROXINE SODIUM 100 MCG
100 TABLET ORAL DAILY
Qty: 90 TABLET | Refills: 1 | Status: SHIPPED | OUTPATIENT
Start: 2024-10-18

## 2024-11-05 ENCOUNTER — TELEPHONE (OUTPATIENT)
Dept: PULMONOLOGY | Facility: CLINIC | Age: 59
End: 2024-11-05

## 2024-11-05 ENCOUNTER — OFFICE VISIT (OUTPATIENT)
Dept: PULMONOLOGY | Facility: CLINIC | Age: 59
End: 2024-11-05
Payer: COMMERCIAL

## 2024-11-05 VITALS
DIASTOLIC BLOOD PRESSURE: 76 MMHG | SYSTOLIC BLOOD PRESSURE: 110 MMHG | BODY MASS INDEX: 21.85 KG/M2 | TEMPERATURE: 96.5 F | OXYGEN SATURATION: 99 % | WEIGHT: 128 LBS | HEIGHT: 64 IN | HEART RATE: 68 BPM

## 2024-11-05 DIAGNOSIS — R05.3 CHRONIC COUGH: Primary | ICD-10-CM

## 2024-11-05 DIAGNOSIS — R05.8 UPPER AIRWAY COUGH SYNDROME: ICD-10-CM

## 2024-11-05 DIAGNOSIS — K21.9 LARYNGOPHARYNGEAL REFLUX (LPR): ICD-10-CM

## 2024-11-05 DIAGNOSIS — J45.20 MILD INTERMITTENT ASTHMA WITHOUT COMPLICATION: ICD-10-CM

## 2024-11-05 PROCEDURE — 99214 OFFICE O/P EST MOD 30 MIN: CPT | Performed by: INTERNAL MEDICINE

## 2024-11-05 PROCEDURE — 94010 BREATHING CAPACITY TEST: CPT | Performed by: INTERNAL MEDICINE

## 2024-11-05 RX ORDER — DOXYCYCLINE 100 MG/1
CAPSULE ORAL
COMMUNITY
Start: 2024-10-26

## 2024-11-05 RX ORDER — BENZONATATE 100 MG/1
CAPSULE ORAL
COMMUNITY
Start: 2024-10-26

## 2024-11-05 NOTE — TELEPHONE ENCOUNTER
Received Outside [facility]  On 11/5/2024, Sending Off to Radiology Reading Room .   LDR 6

## 2024-11-05 NOTE — PROGRESS NOTES
Pulmonary Follow Up Note   Swetha Zuniga 59 y.o. female MRN: [id number]  11/5/2024      Assessment:  Chronic cough  Suspect primarily related to LPR and UACS  Do not highly suspect asthmatic component however mild obstruction  Will plan for care as listed below with goal for 6-8 week trial for improvements  Suspect recent worsening secondary to URI/Bronchitis that is now resolving  Consider neurontin in future    LPR  Nonpharmacologic GERD therapies, raise HOB 2 inches, limit ETOH intake  Add trial pepcid 20mg nightly    Upper Airway cough Syndrome  Add trial of neilmed sinsus rinse daily  Consider resuming Xyzal    Obstruction on Spirometry, possible mild asthma component or secondary #2  Repeat spirometry next visit  Continue Breo for the interim    Plan:    Diagnoses and all orders for this visit:    Chronic cough  -     POCT spirometry    Upper airway cough syndrome    Laryngopharyngeal reflux (LPR)    Mild intermittent asthma without complication    Other orders  -     benzonatate (TESSALON PERLES) 100 mg capsule  -     doxycycline hyclate (VIBRAMYCIN) 100 mg capsule        Return in about 6 weeks (around 12/17/2024) for Recheck.    History of Present Illness   HPI:  Swetha Zuniga is a 59 y.o. female who presents for sick visit. She has seen Dr. Leary for evaluation of chronic cough in May 2024.  Noted at that time she had normal FeNO. She was to start Breo 200 and check CXR, CBC, and NE RAST.      She reports cough had been stable but not resolved. Did not note significant change with Breo but has continued. Noted 2 weeks ago increased cough and dyspnea, sputum production and some fatigue, low grade fevers. Seen [facility] and given doxycycline and tessalon perles. IS improving and now at baseline.  NP cough persists, she denied over GERD symptoms but does drink wine. Can not fully correlate with cough. She did have 2 week PPI course recommended by her son who is PA and noted some  improvements.  Has some post nasal drainage.  .    Review of Systems   Constitutional:  Negative for activity change, chills and fever.   HENT:  Positive for postnasal drip and rhinorrhea. Negative for mouth sores, sore throat and trouble swallowing.    Respiratory:  Positive for cough. Negative for chest tightness, shortness of breath and wheezing.    Cardiovascular:  Negative for chest pain and leg swelling.   Gastrointestinal:  Negative for abdominal pain, nausea and vomiting.   Allergic/Immunologic: Positive for environmental allergies. Negative for immunocompromised state.   Neurological:  Negative for headaches.   Hematological:  Negative for adenopathy.   Psychiatric/Behavioral:  Negative for sleep disturbance.        Historical Information   Past Medical History:   Diagnosis Date    Arthritis     COVID-19 2020    Depression     Disease of thyroid gland     Heart palpitations     Hypothyroidism     PONV (postoperative nausea and vomiting)      Past Surgical History:   Procedure Laterality Date    ABDOMINAL ADHESION SURGERY      APPENDECTOMY      AUGMENTATION MAMMAPLASTY       SECTION  , ,     ECTOPIC PREGNANCY SURGERY      HYSTERECTOMY      partial, still have ovaries     OTHER SURGICAL HISTORY      abdominal lysis with a band put in at age 43    MA EXC B9 LESION MRGN XCP SK TG S/N/H/F/G > 4.0CM N/A 2020    Procedure: CORRECTION UNFAVORABLE SCARRING/STANDING CONES CHIN AND NECK;  Surgeon: Gilberto Ramirez MD;  [facility];  Service: Plastics    MA EXCISION INTERDIGITAL LINTON NEUROMA SINGLE EACH Left 2019    Procedure: EXCISION NEUROMA MORTONS;  Surgeon: James R Lachman, MD;  [facility];  Service: Orthopedics    MA REPAIR COMPLEX F/C/C/M/N/AX/G/H/F 2.6-7.5 CM N/A 2020    Procedure: COMPLEX CLOSURE CHIN;  Surgeon: Gilberto Ramirez MD;  [facility];  Service: Plastics     Family History   Problem Relation Age of Onset     Hypertension Father     No Known Problems Sister     No Known Problems Sister     No Known Problems Maternal Grandmother     Stroke Maternal Grandfather     No Known Problems Paternal Grandmother     Prostate cancer Brother     Emphysema Paternal Uncle     Prostate cancer Other         runs strongly on father's side of family     No Known Problems Maternal Aunt     No Known Problems Maternal Aunt     No Known Problems Paternal Aunt          Meds/Allergies     Current Outpatient Medications:     ascorbic Acid (VITAMIN C) 500 MG CPCR, Take 1 capsule by mouth daily, Disp: , Rfl:     B Complex Vitamins (VITAMIN B COMPLEX PO), Take 1 capsule by mouth daily in the early morning  , Disp: , Rfl:     B Complex-C CAPS, Take 1 capsule by mouth daily, Disp: , Rfl:     benzonatate (TESSALON PERLES) 100 mg capsule, , Disp: , Rfl:     buPROPion (WELLBUTRIN SR) 100 mg 12 hr tablet, TAKE 1 TABLET BY [facility] TWICE DAILY, Disp: , Rfl: 0    busPIRone (BUSPAR) 5 mg tablet, Take 5 mg by mouth 2 (two) times a day, Disp: , Rfl:     Calcium Carbonate (CALTRATE 600 PO), Take 1 capsule by mouth daily in the early morning  , Disp: , Rfl:     Coenzyme Q10 (CO Q 10) 100 MG CAPS, Take 1 capsule by mouth daily at bedtime  , Disp: , Rfl:     doxycycline hyclate (VIBRAMYCIN) 100 mg capsule, , Disp: , Rfl:     fluticasone-vilanterol 200-25 mcg/actuation inhaler, INHALE ONE PUFF DAILY, Disp: , Rfl:     levocetirizine (XYZAL) 5 MG tablet, Take 5 mg by mouth daily as needed for allergies , Disp: , Rfl:     NON FORMULARY, Take 1 capsule by mouth every other day DMAE , Disp: , Rfl:     NON FORMULARY, Take 1 capsule by mouth daily Cayden Niagen, Disp: , Rfl:     Synthroid 100 MCG tablet, Take 1 tablet (100 mcg total) by mouth daily, Disp: 90 tablet, Rfl: 1    Vitamin Mixture (PRANAV-C PO), Take 1,000 mg by mouth daily, Disp: , Rfl:     clindamycin (CLEOCIN T) 1 % lotion, APPLY TO FACE TWICE A DAY (Patient not taking: Reported on 2/20/2024), Disp: , Rfl:      "scopolamine (TRANSDERM-SCOP) 1 mg/3 days TD 72 hr patch, Place 1 patch on the skin every third day, Disp: , Rfl:     Soolantra 1 % CREA, APPLY TO THE FACE AT BEDTIME (Patient not taking: Reported on 3/25/2024), Disp: , Rfl:   No Known Allergies    Vitals: Blood pressure 110/76, pulse 68, temperature (!) 96.5 °F (35.8 °C), temperature source Tympanic, height 5' 4\" (1.626 m), weight 58.1 kg (128 lb), SpO2 99%. Body mass index is 21.97 kg/m². Oxygen Therapy  SpO2: 99 %  Oxygen Therapy: None (Room air)      Physical Exam  Physical Exam  Vitals reviewed.   Constitutional:       General: She is not in acute distress.     Appearance: Normal appearance. She is well-developed and normal weight. She is not ill-appearing, toxic-appearing or diaphoretic.   HENT:      Head: Normocephalic and atraumatic.      Right Ear: External ear normal.      Left Ear: External ear normal.      Nose: Rhinorrhea present.      Comments: Slight rhinorrhea and nasal turbinate bogginess     Mouth/Throat:      Mouth: Mucous membranes are moist.      Pharynx: Oropharynx is clear. No oropharyngeal exudate.      Comments: Moderate posterior pharyngeal cobblestoning  Eyes:      General: No scleral icterus.        Right eye: No discharge.         Left eye: No discharge.      Conjunctiva/sclera: Conjunctivae normal.      Pupils: Pupils are equal, round, and reactive to light.   Neck:      Vascular: No JVD.      Trachea: No tracheal deviation.   Cardiovascular:      Rate and Rhythm: Normal rate and regular rhythm.      Heart sounds: Normal heart sounds. No murmur heard.     No gallop.   Pulmonary:      Effort: Pulmonary effort is normal. No respiratory distress.      Breath sounds: Normal breath sounds. No stridor. No wheezing, rhonchi or rales.   Abdominal:      General: Bowel sounds are normal. There is no distension.      Palpations: Abdomen is soft.      Tenderness: There is no abdominal tenderness. There is no guarding or rebound.   Musculoskeletal:   "       General: No deformity.      Right lower leg: No edema.      Left lower leg: No edema.   Lymphadenopathy:      Cervical: No cervical adenopathy.   Skin:     General: Skin is warm and dry.      Coloration: Skin is not jaundiced.      Findings: No erythema or rash.   Neurological:      General: No focal deficit present.      Mental Status: She is alert and oriented to person, place, and time. Mental status is at baseline.   Psychiatric:         Mood and Affect: Mood normal.         Behavior: Behavior normal.         Thought Content: Thought content normal.         Labs: I have personally reviewed pertinent lab results.  Lab Results   Component Value Date    WBC 4.25 (L) 05/15/2024    HGB 13.3 05/15/2024    HCT 40.6 05/15/2024    MCV 97 05/15/2024     05/15/2024     Lab Results   Component Value Date    CALCIUM 9.2 2024    K 3.6 2024    CO2 29 2024     2024    BUN 16 2024    CREATININE 0.70 2024     Lab Results   Component Value Date    IGE 42.0 05/15/2024     Lab Results   Component Value Date    ALT 40 2024    AST 37 2024    ALKPHOS 62 2024       5/15/2024 - NE RAST (+) Cat dander, silver birch, dust mites, dog dander, oak, ragweed, walnut tree - total IgE 42    Abs Eos 100    Imaging and other studies: Results Review Statement: I personally reviewed the following image studies in PACS and associated radiology reports: chest xray. My interpretation of the radiology images/reports is:  .  CXR 2024 - no focal infiltrates, no effusions, no PTX.  Implants in place    Pulmonary function testin2024 - Ratio 67% Z -1.73, FVC 3.35L (104%, Z 0.31), FEV1 2.25L (89%, Z -0.78) - mild obstructive airflow defect    Hieu Bonilla, DO, FACP  [facility]

## 2024-11-05 NOTE — PATIENT INSTRUCTIONS
Stay on Breo x 4-6 weeks  Start Neilmed Sinus rinse daily   Start pepcid 20mg over the counter at night for next 6 weeks

## 2024-11-05 NOTE — LETTER
November 5, 2024     Alvin Leary MD  [address]  [address]  [facility] [address]    Patient: Swetha Zuniga   YOB: 1965   Date of Visit: 11/5/2024       Dear Dr. Leary:    Thank you for referring Swetha Zuniga to me for evaluation. Below are my notes for this consultation.    If you have questions, please do not hesitate to call me. I look forward to following your patient along with you.         Sincerely,        Hieu Bonilla DO        CC: No Recipients    Hieu Bonilla DO  11/5/2024  5:15 PM  Sign when Signing Visit  Pulmonary Follow Up Note   Swetha Zuniga 59 y.o. female MRN: [id number]  11/5/2024      Assessment:  Chronic cough  Suspect primarily related to LPR and UACS  Do not highly suspect asthmatic component however mild obstruction  Will plan for care as listed below with goal for 6-8 week trial for improvements  Suspect recent worsening secondary to URI/Bronchitis that is now resolving  Consider neurontin in future    LPR  Nonpharmacologic GERD therapies, raise HOB 2 inches, limit ETOH intake  Add trial pepcid 20mg nightly    Upper Airway cough Syndrome  Add trial of neilmed sinsus rinse daily  Consider resuming Xyzal    Obstruction on Spirometry, possible mild asthma component or secondary #2  Repeat spirometry next visit  Continue Breo for the interim    Plan:    Diagnoses and all orders for this visit:    Chronic cough  -     POCT spirometry    Upper airway cough syndrome    Laryngopharyngeal reflux (LPR)    Mild intermittent asthma without complication    Other orders  -     benzonatate (TESSALON PERLES) 100 mg capsule  -     doxycycline hyclate (VIBRAMYCIN) 100 mg capsule        Return in about 6 weeks (around 12/17/2024) for Recheck.    History of Present Illness  HPI:  Swetha uZniga is a 59 y.o. female who presents for sick visit. She has seen Dr. Leary for evaluation of chronic cough in May 2024.  Noted at  that time she had normal FeNO. She was to start Breo 200 and check CXR, CBC, and NE RAST.      She reports cough had been stable but not resolved. Did not note significant change with Breo but has continued. Noted 2 weeks ago increased cough and dyspnea, sputum production and some fatigue, low grade fevers. Seen [facility] and given doxycycline and tessalon perles. IS improving and now at baseline.  NP cough persists, she denied over GERD symptoms but does drink wine. Can not fully correlate with cough. She did have 2 week PPI course recommended by her son who is PA and noted some improvements.  Has some post nasal drainage.  .    Review of Systems   Constitutional:  Negative for activity change, chills and fever.   HENT:  Positive for postnasal drip and rhinorrhea. Negative for mouth sores, sore throat and trouble swallowing.    Respiratory:  Positive for cough. Negative for chest tightness, shortness of breath and wheezing.    Cardiovascular:  Negative for chest pain and leg swelling.   Gastrointestinal:  Negative for abdominal pain, nausea and vomiting.   Allergic/Immunologic: Positive for environmental allergies. Negative for immunocompromised state.   Neurological:  Negative for headaches.   Hematological:  Negative for adenopathy.   Psychiatric/Behavioral:  Negative for sleep disturbance.        Historical Information  Past Medical History:   Diagnosis Date   • Arthritis    • COVID-19 2020   • Depression    • Disease of thyroid gland    • Heart palpitations    • Hypothyroidism    • PONV (postoperative nausea and vomiting)      Past Surgical History:   Procedure Laterality Date   • ABDOMINAL ADHESION SURGERY     • APPENDECTOMY     • AUGMENTATION MAMMAPLASTY     •  SECTION  , ,    • ECTOPIC PREGNANCY SURGERY     • HYSTERECTOMY  2006    partial, still have ovaries    • OTHER SURGICAL HISTORY      abdominal lysis with a band put in at age 43   • OR EXC B9 LESION MRGN XCP SK TG  S/N/H/F/G > 4.0CM N/A 11/23/2020    Procedure: CORRECTION UNFAVORABLE SCARRING/STANDING CONES CHIN AND NECK;  Surgeon: Gilberto Ramirez MD;  [facility];  Service: Plastics   • NE EXCISION INTERDIGITAL LINTON NEUROMA SINGLE EACH Left 2/13/2019    Procedure: EXCISION NEUROMA MORTONS;  Surgeon: James R Lachman, MD;  [facility];  Service: Orthopedics   • NE REPAIR COMPLEX F/C/C/M/N/AX/G/H/F 2.6-7.5 CM N/A 11/23/2020    Procedure: COMPLEX CLOSURE CHIN;  Surgeon: Gilberto Ramirez MD;  [facility];  Service: Plastics     Family History   Problem Relation Age of Onset   • Hypertension Father    • No Known Problems Sister    • No Known Problems Sister    • No Known Problems Maternal Grandmother    • Stroke Maternal Grandfather    • No Known Problems Paternal Grandmother    • Prostate cancer Brother    • Emphysema Paternal Uncle    • Prostate cancer Other         runs strongly on father's side of family    • No Known Problems Maternal Aunt    • No Known Problems Maternal Aunt    • No Known Problems Paternal Aunt          Meds/Allergies    Current Outpatient Medications:   •  ascorbic Acid (VITAMIN C) 500 MG CPCR, Take 1 capsule by mouth daily, Disp: , Rfl:   •  B Complex Vitamins (VITAMIN B COMPLEX PO), Take 1 capsule by mouth daily in the early morning  , Disp: , Rfl:   •  B Complex-C CAPS, Take 1 capsule by mouth daily, Disp: , Rfl:   •  benzonatate (TESSALON PERLES) 100 mg capsule, , Disp: , Rfl:   •  buPROPion (WELLBUTRIN SR) 100 mg 12 hr tablet, [facility] DAILY, Disp: , Rfl: 0  •  busPIRone (BUSPAR) 5 mg tablet, Take 5 mg by mouth 2 (two) times a day, Disp: , Rfl:   •  Calcium Carbonate (CALTRATE 600 PO), Take 1 capsule by mouth daily in the early morning  , Disp: , Rfl:   •  Coenzyme Q10 (CO Q 10) 100 MG CAPS, Take 1 capsule by mouth daily at bedtime  , Disp: , Rfl:   •  doxycycline hyclate (VIBRAMYCIN) 100 mg capsule, , Disp: , Rfl:   •  fluticasone-vilanterol  "200-25 mcg/actuation inhaler, INHALE ONE PUFF DAILY, Disp: , Rfl:   •  levocetirizine (XYZAL) 5 MG tablet, Take 5 mg by mouth daily as needed for allergies , Disp: , Rfl:   •  NON FORMULARY, Take 1 capsule by mouth every other day DMAE , Disp: , Rfl:   •  NON FORMULARY, Take 1 capsule by mouth daily Cayden Niagen, Disp: , Rfl:   •  Synthroid 100 MCG tablet, Take 1 tablet (100 mcg total) by mouth daily, Disp: 90 tablet, Rfl: 1  •  Vitamin Mixture (PRANAV-C PO), Take 1,000 mg by mouth daily, Disp: , Rfl:   •  clindamycin (CLEOCIN T) 1 % lotion, APPLY TO FACE TWICE A DAY (Patient not taking: Reported on 2/20/2024), Disp: , Rfl:   •  scopolamine (TRANSDERM-SCOP) 1 mg/3 days TD 72 hr patch, Place 1 patch on the skin every third day, Disp: , Rfl:   •  Soolantra 1 % CREA, APPLY TO THE FACE AT BEDTIME (Patient not taking: Reported on 3/25/2024), Disp: , Rfl:   No Known Allergies    Vitals: Blood pressure 110/76, pulse 68, temperature (!) 96.5 °F (35.8 °C), temperature source Tympanic, height 5' 4\" (1.626 m), weight 58.1 kg (128 lb), SpO2 99%. Body mass index is 21.97 kg/m². Oxygen Therapy  SpO2: 99 %  Oxygen Therapy: None (Room air)      Physical Exam  Physical Exam  Vitals reviewed.   Constitutional:       General: She is not in acute distress.     Appearance: Normal appearance. She is well-developed and normal weight. She is not ill-appearing, toxic-appearing or diaphoretic.   HENT:      Head: Normocephalic and atraumatic.      Right Ear: External ear normal.      Left Ear: External ear normal.      Nose: Rhinorrhea present.      Comments: Slight rhinorrhea and nasal turbinate bogginess     Mouth/Throat:      Mouth: Mucous membranes are moist.      Pharynx: Oropharynx is clear. No oropharyngeal exudate.      Comments: Moderate posterior pharyngeal cobblestoning  Eyes:      General: No scleral icterus.        Right eye: No discharge.         Left eye: No discharge.      Conjunctiva/sclera: Conjunctivae normal.      Pupils: " Pupils are equal, round, and reactive to light.   Neck:      Vascular: No JVD.      Trachea: No tracheal deviation.   Cardiovascular:      Rate and Rhythm: Normal rate and regular rhythm.      Heart sounds: Normal heart sounds. No murmur heard.     No gallop.   Pulmonary:      Effort: Pulmonary effort is normal. No respiratory distress.      Breath sounds: Normal breath sounds. No stridor. No wheezing, rhonchi or rales.   Abdominal:      General: Bowel sounds are normal. There is no distension.      Palpations: Abdomen is soft.      Tenderness: There is no abdominal tenderness. There is no guarding or rebound.   Musculoskeletal:         General: No deformity.      Right lower leg: No edema.      Left lower leg: No edema.   Lymphadenopathy:      Cervical: No cervical adenopathy.   Skin:     General: Skin is warm and dry.      Coloration: Skin is not jaundiced.      Findings: No erythema or rash.   Neurological:      General: No focal deficit present.      Mental Status: She is alert and oriented to person, place, and time. Mental status is at baseline.   Psychiatric:         Mood and Affect: Mood normal.         Behavior: Behavior normal.         Thought Content: Thought content normal.         Labs: I have personally reviewed pertinent lab results.  Lab Results   Component Value Date    WBC 4.25 (L) 05/15/2024    HGB 13.3 05/15/2024    HCT 40.6 05/15/2024    MCV 97 05/15/2024     05/15/2024     Lab Results   Component Value Date    CALCIUM 9.2 03/12/2024    K 3.6 03/12/2024    CO2 29 03/12/2024     03/12/2024    BUN 16 03/12/2024    CREATININE 0.70 03/12/2024     Lab Results   Component Value Date    IGE 42.0 05/15/2024     Lab Results   Component Value Date    ALT 40 03/12/2024    AST 37 03/12/2024    ALKPHOS 62 03/12/2024       5/15/2024 - NE RAST (+) Cat dander, silver birch, dust mites, dog dander, oak, ragweed, walnut tree - total IgE 42    Abs Eos 100    Imaging and other studies: Results Review  Statement: I personally reviewed the following image studies in PACS and associated radiology reports: chest xray. My interpretation of the radiology images/reports is:  .  CXR 2024 - no focal infiltrates, no effusions, no PTX.  Implants in place    Pulmonary function testin2024 - Ratio 67% Z -1.73, FVC 3.35L (104%, Z 0.31), FEV1 2.25L (89%, Z -0.78) - mild obstructive airflow defect    Hieu Bonilla DO, FACP  [facility]

## 2024-11-08 NOTE — TELEPHONE ENCOUNTER
Received Outside Imagaing Disk from Radiology Reading Room   On 11/8/2024, Sending Off back to patient.

## 2024-12-05 PROBLEM — R05.8 UPPER AIRWAY COUGH SYNDROME: Status: RESOLVED | Noted: 2024-11-05 | Resolved: 2024-12-05

## 2025-02-20 ENCOUNTER — CLINICAL SUPPORT (OUTPATIENT)
Dept: VASCULAR SURGERY | Facility: CLINIC | Age: 60
End: 2025-02-20

## 2025-02-20 VITALS
HEART RATE: 58 BPM | WEIGHT: 128 LBS | HEIGHT: 64 IN | DIASTOLIC BLOOD PRESSURE: 74 MMHG | SYSTOLIC BLOOD PRESSURE: 136 MMHG | BODY MASS INDEX: 21.85 KG/M2

## 2025-02-20 DIAGNOSIS — I78.1 SPIDER VEINS: ICD-10-CM

## 2025-02-20 PROCEDURE — 36468 NJX SCLRSNT SPIDER VEINS: CPT | Performed by: NURSE PRACTITIONER

## 2025-02-20 NOTE — PROGRESS NOTES
SCLEROTHERAPY PROCEDURE NOTE -  VASCULAR      Assessment/Plan:        Problem List Items Addressed This Visit          Cardiovascular and Mediastinum    Spider veins    60 year old female with bilateral lower extremity spider telangiectasias, hx of injection sclerotherapy years ago by Dr Gooden with sub optimal results, covered by tattoos and then subsequent laser tattoo removal right thigh.     Presents today for cosmetic sclerotherapy     -Area of concern right anterior lateral thigh treated with Asclera 0.5% solution with good floridalma. Injection were segmented  -Compression dressing and stocking applied  -Stockings to be worn for 48 hours continuously and then daily there after  -Follow up in the office in 4-6 weeks to recheck injection sites   -Anticipate bruising, injection sites will appear worse initially and slowly start to fade over several weeks               Subjective:      Patient ID: Swetha Zuniga is a 60 y.o. female      Indications for Procedure: Patient presents with Right anterior lateral dilated reticular veins and telangiectasia on the legs.     Objective      Exam: On bilateral lower extremities, dilated reticular veins and telangiectasias are present symmetrically without findings of chronic venous insufficiency.             Superficial prominent veins.  Patient advised or risks of pain upon injection, redness and swelling after treatment, bruising and bleeding, necrosis/ulceration, allergy, discoloration and the likelihood that multiple treatments may be necessary and clearance may not be complete.  Affected areas on the right anterior lateral thigh were treated with intravascular injections of 6 cc of  0.5% Asclera using a 32G syringe per ’s protocol.    The patient tolerated the procedure well with minimal erythema and edema. Immediate pressure was applied with cotton balls secured with tape at the injection sites. Compression stockings of 20-30mm Hg were applied  to the treated legs. The patient was advised to wear compression stockings during the day for the next 4 weeks.     Follow up in 4-6 weeks

## 2025-02-20 NOTE — ASSESSMENT & PLAN NOTE
60 year old female with bilateral lower extremity spider telangiectasias, hx of injection sclerotherapy years ago by Dr Gooden with sub optimal results, covered by tattoos and then subsequent laser tattoo removal right thigh.     Presents today for cosmetic sclerotherapy     -Area of concern right anterior lateral thigh treated with Asclera 0.5% solution with good floridalma. Injection were segmented  -Compression dressing and stocking applied  -Stockings to be worn for 48 hours continuously and then daily there after  -Follow up in the office in 4-6 weeks to recheck injection sites   -Anticipate bruising, injection sites will appear worse initially and slowly start to fade over several weeks    no

## 2025-03-10 ENCOUNTER — OFFICE VISIT (OUTPATIENT)
Dept: PULMONOLOGY | Facility: CLINIC | Age: 60
End: 2025-03-10
Payer: COMMERCIAL

## 2025-03-10 VITALS
TEMPERATURE: 98.4 F | SYSTOLIC BLOOD PRESSURE: 106 MMHG | HEIGHT: 64 IN | DIASTOLIC BLOOD PRESSURE: 64 MMHG | HEART RATE: 65 BPM | OXYGEN SATURATION: 99 % | BODY MASS INDEX: 21.99 KG/M2 | WEIGHT: 128.8 LBS

## 2025-03-10 DIAGNOSIS — J30.89 ENVIRONMENTAL AND SEASONAL ALLERGIES: ICD-10-CM

## 2025-03-10 DIAGNOSIS — R05.3 CHRONIC COUGH: Primary | ICD-10-CM

## 2025-03-10 PROCEDURE — 99213 OFFICE O/P EST LOW 20 MIN: CPT | Performed by: INTERNAL MEDICINE

## 2025-03-10 RX ORDER — AMOXICILLIN 875 MG/1
TABLET, COATED ORAL
COMMUNITY
Start: 2025-02-15

## 2025-03-10 RX ORDER — AZELASTINE 1 MG/ML
1 SPRAY, METERED NASAL 2 TIMES DAILY
Qty: 30 ML | Refills: 1 | Status: SHIPPED | OUTPATIENT
Start: 2025-03-10 | End: 2025-09-26

## 2025-03-10 NOTE — ASSESSMENT & PLAN NOTE
As above  Orders:    azelastine (ASTELIN) 0.1 % nasal spray; 1 spray into each nostril 2 (two) times a day Use in each nostril as directed    Ambulatory Referral to Allergy; Future

## 2025-03-10 NOTE — PATIENT INSTRUCTIONS
Given trial of Azelastine 1-2 sprays each nostril twice daily  Will request Allergist evaluation - Dr. Genny Myers  Continue to hold further Breo, Pepcid and nasal saline irrigation given lack of improvements

## 2025-03-10 NOTE — PROGRESS NOTES
Pulmonary Follow Up Note   Swetha Zuniga 60 y.o. female MRN: [id number]  3/10/2025      Name: Swetha Zuniga      : 1965      MRN: [id number]  Encounter Provider: Hieu Bonilla DO  Encounter Date: 3/10/2025   Encounter department: [facility] [place]  :  Assessment & Plan  Chronic cough  I suspect primary etiology of her cough is her environmental allergies and persistent post nasal drainage/UACS  She did not have benefit with ICS/LABA and/or H2 blockade - though LPR and cough variant asthma may still play a role  Willing for trial of Azelastine as not a nasal steroid - will try now, intolerant to nasal saline irrigation  Given further limited treatment options will refer to allergist for evaluation of allergy shots   Can follow up in 4 months or sooner as needed  Could consider trial of neurontin in the future  Orders:    azelastine (ASTELIN) 0.1 % nasal spray; 1 spray into each nostril 2 (two) times a day Use in each nostril as directed    Ambulatory Referral to Allergy; Future    Environmental and seasonal allergies  As above  Orders:    azelastine (ASTELIN) 0.1 % nasal spray; 1 spray into each nostril 2 (two) times a day Use in each nostril as directed    Ambulatory Referral to Allergy; Future      Return in about 4 months (around 7/10/2025) for Recheck.    History of Present Illness   HPI:  Swetha Zuniga is a 60 y.o. female who presented in 2024 for sick visit. She has seen Dr. Leary for evaluation of chronic cough in May 2024.  Noted at that time she had normal FeNO. She was to start Breo 200 and check CXR, CBC, and NE RAST.  She reports cough had been stable but not resolved. Did not note significant change with Breo but has continued. Suspected multifactorial cough to include possible asthma, LPR and UACS. Plans made to continue Breo, start pepcid, and saline sinus irrigation. She presents today for follow up.    She reports she used  Breo and pepcid consistently until 2025 and had no change in cough symptoms. Continues with mostly NP cough, intermittent periods during the day, denied nocturnal cough symptoms. She reports she exercises regularly and denied dyspnea with exertion. She denied reflux symptoms. She does endorse consistent rhinorrhea.  She notes some post nasal drainage symptoms. Notes dog and cat allergies and has dog exposure.      She reports she did not feel improved with nasal saline irrigation and stopped - felt worsened sinus symptoms. Feels Xyzal no longer controls symptoms. She reports she does not desire nasal inhaled steroids due to possible side effects.     Review of Systems   Constitutional:  Negative for activity change, chills and fever.   HENT:  Positive for postnasal drip and rhinorrhea. Negative for sore throat and trouble swallowing.    Respiratory:  Positive for cough. Negative for chest tightness, shortness of breath and wheezing.    Cardiovascular:  Negative for chest pain and leg swelling.   Gastrointestinal:  Negative for abdominal pain, nausea and vomiting.   Allergic/Immunologic: Positive for environmental allergies. Negative for immunocompromised state.   Neurological:  Negative for light-headedness and headaches.   Psychiatric/Behavioral:  Negative for sleep disturbance. The patient is not nervous/anxious.        Historical Information   Past Medical History:   Diagnosis Date    Arthritis     COVID-19 2020    Depression     Disease of thyroid gland     Heart palpitations     Hypothyroidism     PONV (postoperative nausea and vomiting)      Past Surgical History:   Procedure Laterality Date    ABDOMINAL ADHESION SURGERY      APPENDECTOMY      AUGMENTATION MAMMAPLASTY       SECTION  , ,     ECTOPIC PREGNANCY SURGERY      HYSTERECTOMY      partial, still have ovaries     OTHER SURGICAL HISTORY      abdominal lysis with a band put in at age 43    WV EXC B9 LESION MRGN  XCP SK TG S/N/H/F/G > 4.0CM N/A 11/23/2020    Procedure: CORRECTION UNFAVORABLE SCARRING/STANDING CONES CHIN AND NECK;  Surgeon: Gilberto Ramirez MD;  [facility];  Service: Plastics    NJ EXCISION INTERDIGITAL LINTON NEUROMA SINGLE EACH Left 2/13/2019    Procedure: EXCISION NEUROMA MORTONS;  Surgeon: James R Lachman, MD;  [facility];  Service: Orthopedics    NJ REPAIR COMPLEX F/C/C/M/N/AX/G/H/F 2.6-7.5 CM N/A 11/23/2020    Procedure: COMPLEX CLOSURE CHIN;  Surgeon: Gilberto Ramirez MD;  [facility];  Service: Plastics     Family History   Problem Relation Age of Onset    Hypertension Father     No Known Problems Sister     No Known Problems Sister     No Known Problems Maternal Grandmother     Stroke Maternal Grandfather     No Known Problems Paternal Grandmother     Prostate cancer Brother     Emphysema Paternal Uncle     Prostate cancer Other         runs strongly on father's side of family     No Known Problems Maternal Aunt     No Known Problems Maternal Aunt     No Known Problems Paternal Aunt          Meds/Allergies     Current Outpatient Medications:     amoxicillin (AMOXIL) 875 mg tablet, , Disp: , Rfl:     ascorbic Acid (VITAMIN C) 500 MG CPCR, Take 1 capsule by mouth daily, Disp: , Rfl:     azelastine (ASTELIN) 0.1 % nasal spray, 1 spray into each nostril 2 (two) times a day Use in each nostril as directed, Disp: 30 mL, Rfl: 1    B Complex Vitamins (VITAMIN B COMPLEX PO), Take 1 capsule by mouth daily in the early morning  , Disp: , Rfl:     B Complex-C CAPS, Take 1 capsule by mouth daily, Disp: , Rfl:     buPROPion (WELLBUTRIN SR) 100 mg 12 hr tablet, TAKE 1 TABLET BY [facility] TWICE DAILY, Disp: , Rfl: 0    Calcium Carbonate (CALTRATE 600 PO), Take 1 capsule by mouth daily in the early morning  , Disp: , Rfl:     Coenzyme Q10 (CO Q 10) 100 MG CAPS, Take 1 capsule by mouth daily at bedtime  , Disp: , Rfl:     NON FORMULARY, Take 1 capsule by mouth every other day DMAE ,  "Disp: , Rfl:     NON FORMULARY, Take 1 capsule by mouth daily Acyden Niagen, Disp: , Rfl:     Synthroid 100 MCG tablet, Take 1 tablet (100 mcg total) by mouth daily, Disp: 90 tablet, Rfl: 1    Vitamin Mixture (PRANAV-C PO), Take 1,000 mg by mouth daily, Disp: , Rfl:     benzonatate (TESSALON PERLES) 100 mg capsule, , Disp: , Rfl:     busPIRone (BUSPAR) 5 mg tablet, Take 5 mg by mouth 2 (two) times a day (Patient not taking: Reported on 2/20/2025), Disp: , Rfl:     clindamycin (CLEOCIN T) 1 % lotion, APPLY TO FACE TWICE A DAY (Patient not taking: Reported on 2/20/2024), Disp: , Rfl:     doxycycline hyclate (VIBRAMYCIN) 100 mg capsule, , Disp: , Rfl:     fluticasone-vilanterol 200-25 mcg/actuation inhaler, INHALE ONE PUFF DAILY (Patient not taking: Reported on 2/20/2025), Disp: , Rfl:     levocetirizine (XYZAL) 5 MG tablet, Take 5 mg by mouth daily as needed for allergies  (Patient not taking: Reported on 2/20/2025), Disp: , Rfl:     scopolamine (TRANSDERM-SCOP) 1 mg/3 days TD 72 hr patch, Place 1 patch on the skin every third day, Disp: , Rfl:     Soolantra 1 % CREA, APPLY TO THE FACE AT BEDTIME (Patient not taking: Reported on 3/25/2024), Disp: , Rfl:   No Known Allergies    Vitals: Blood pressure 106/64, pulse 65, temperature 98.4 °F (36.9 °C), temperature source Tympanic, height 5' 4\" (1.626 m), weight 58.4 kg (128 lb 12.8 oz), SpO2 99%. Body mass index is 22.11 kg/m². Oxygen Therapy  SpO2: 99 %  Oxygen Therapy: None (Room air)      Physical Exam  Physical Exam  Vitals reviewed.   Constitutional:       General: She is not in acute distress.     Appearance: Normal appearance. She is well-developed and normal weight. She is not ill-appearing, toxic-appearing or diaphoretic.   HENT:      Head: Normocephalic and atraumatic.      Right Ear: External ear normal.      Left Ear: External ear normal.      Nose: Congestion and rhinorrhea present.      Comments: Nasal turbinate erythema and bogginess, no purulence     " Mouth/Throat:      Mouth: Mucous membranes are moist.      Pharynx: No oropharyngeal exudate.      Comments: Posterior cobblestoning  Eyes:      General: No scleral icterus.        Right eye: No discharge.         Left eye: No discharge.      Conjunctiva/sclera: Conjunctivae normal.      Pupils: Pupils are equal, round, and reactive to light.   Neck:      Vascular: No JVD.      Trachea: No tracheal deviation.   Cardiovascular:      Rate and Rhythm: Normal rate and regular rhythm.      Heart sounds: Normal heart sounds. No murmur heard.     No gallop.   Pulmonary:      Effort: Pulmonary effort is normal. No respiratory distress.      Breath sounds: Normal breath sounds. No stridor. No wheezing, rhonchi or rales.   Musculoskeletal:         General: No deformity.      Right lower leg: No edema.      Left lower leg: No edema.   Lymphadenopathy:      Cervical: No cervical adenopathy.   Skin:     General: Skin is warm and dry.      Coloration: Skin is not jaundiced.      Findings: No erythema or rash.   Neurological:      General: No focal deficit present.      Mental Status: She is alert and oriented to person, place, and time. Mental status is at baseline.   Psychiatric:         Mood and Affect: Mood normal.         Behavior: Behavior normal.         Thought Content: Thought content normal.         Labs: I have personally reviewed pertinent lab results.  Lab Results   Component Value Date    WBC 4.25 (L) 05/15/2024    HGB 13.3 05/15/2024    HCT 40.6 05/15/2024    MCV 97 05/15/2024     05/15/2024     Lab Results   Component Value Date    CALCIUM 9.2 03/12/2024    K 3.6 03/12/2024    CO2 29 03/12/2024     03/12/2024    BUN 16 03/12/2024    CREATININE 0.70 03/12/2024     Lab Results   Component Value Date    IGE 42.0 05/15/2024     Lab Results   Component Value Date    ALT 40 03/12/2024    AST 37 03/12/2024    ALKPHOS 62 03/12/2024       5/15/2024 - NE RAST (+) Cat dander, silver birch, dust mites, dog dander,  oak, ragweed, walnut tree - total IgE 42     Abs Eos 100     Imaging and other studies: New images and reports personally reviewed      CXR 2024 - no focal infiltrates, no effusions, no PTX.  Implants in place     Pulmonary function testin2024 - Ratio 67% Z -1.73, FVC 3.35L (104%, Z 0.31), FEV1 2.25L (89%, Z -0.78) - mild obstructive airflow defect    Hieu Bonilla, , FACP  [facility]

## 2025-03-10 NOTE — ASSESSMENT & PLAN NOTE
I suspect primary etiology of her cough is her environmental allergies and persistent post nasal drainage/UACS  She did not have benefit with ICS/LABA and/or H2 blockade - though LPR and cough variant asthma may still play a role  Willing for trial of Azelastine as not a nasal steroid - will try now, intolerant to nasal saline irrigation  Given further limited treatment options will refer to allergist for evaluation of allergy shots   Can follow up in 4 months or sooner as needed  Could consider trial of neurontin in the future  Orders:    azelastine (ASTELIN) 0.1 % nasal spray; 1 spray into each nostril 2 (two) times a day Use in each nostril as directed    Ambulatory Referral to Allergy; Future

## 2025-03-14 ENCOUNTER — TELEPHONE (OUTPATIENT)
Dept: ENDOCRINOLOGY | Facility: CLINIC | Age: 60
End: 2025-03-14

## 2025-03-14 ENCOUNTER — APPOINTMENT (OUTPATIENT)
Dept: LAB | Facility: CLINIC | Age: 60
End: 2025-03-14
Payer: COMMERCIAL

## 2025-03-14 DIAGNOSIS — Z00.00 ROUTINE GENERAL MEDICAL EXAMINATION AT A HEALTH CARE FACILITY: ICD-10-CM

## 2025-03-14 DIAGNOSIS — E55.9 VITAMIN D DEFICIENCY: ICD-10-CM

## 2025-03-14 DIAGNOSIS — Z11.59 NEED FOR HEPATITIS C SCREENING TEST: ICD-10-CM

## 2025-03-14 LAB
25(OH)D3 SERPL-MCNC: 45.6 NG/ML (ref 30–100)
ALBUMIN SERPL BCG-MCNC: 4.2 G/DL (ref 3.5–5)
ALP SERPL-CCNC: 60 U/L (ref 34–104)
ALT SERPL W P-5'-P-CCNC: 38 U/L (ref 7–52)
ANION GAP SERPL CALCULATED.3IONS-SCNC: 5 MMOL/L (ref 4–13)
AST SERPL W P-5'-P-CCNC: 33 U/L (ref 13–39)
BILIRUB SERPL-MCNC: 0.57 MG/DL (ref 0.2–1)
BUN SERPL-MCNC: 19 MG/DL (ref 5–25)
CALCIUM SERPL-MCNC: 9.2 MG/DL (ref 8.4–10.2)
CHLORIDE SERPL-SCNC: 105 MMOL/L (ref 96–108)
CHOLEST SERPL-MCNC: 181 MG/DL (ref ?–200)
CO2 SERPL-SCNC: 31 MMOL/L (ref 21–32)
CREAT SERPL-MCNC: 0.79 MG/DL (ref 0.6–1.3)
ERYTHROCYTE [DISTWIDTH] IN BLOOD BY AUTOMATED COUNT: 11.4 % (ref 11.6–15.1)
GFR SERPL CREATININE-BSD FRML MDRD: 82 ML/MIN/1.73SQ M
GLUCOSE P FAST SERPL-MCNC: 86 MG/DL (ref 65–99)
HCT VFR BLD AUTO: 40.3 % (ref 34.8–46.1)
HCV AB SER QL: NORMAL
HDLC SERPL-MCNC: 61 MG/DL
HGB BLD-MCNC: 13.3 G/DL (ref 11.5–15.4)
LDLC SERPL CALC-MCNC: 105 MG/DL (ref 0–100)
MCH RBC QN AUTO: 30.9 PG (ref 26.8–34.3)
MCHC RBC AUTO-ENTMCNC: 33 G/DL (ref 31.4–37.4)
MCV RBC AUTO: 94 FL (ref 82–98)
NONHDLC SERPL-MCNC: 120 MG/DL
PLATELET # BLD AUTO: 151 THOUSANDS/UL (ref 149–390)
PMV BLD AUTO: 10.1 FL (ref 8.9–12.7)
POTASSIUM SERPL-SCNC: 3.5 MMOL/L (ref 3.5–5.3)
PROT SERPL-MCNC: 6.7 G/DL (ref 6.4–8.4)
RBC # BLD AUTO: 4.31 MILLION/UL (ref 3.81–5.12)
SODIUM SERPL-SCNC: 141 MMOL/L (ref 135–147)
TRIGL SERPL-MCNC: 76 MG/DL (ref ?–150)
WBC # BLD AUTO: 3.29 THOUSAND/UL (ref 4.31–10.16)

## 2025-03-14 PROCEDURE — 80061 LIPID PANEL: CPT

## 2025-03-14 PROCEDURE — 85027 COMPLETE CBC AUTOMATED: CPT

## 2025-03-14 PROCEDURE — 36415 COLL VENOUS BLD VENIPUNCTURE: CPT

## 2025-03-14 PROCEDURE — 82306 VITAMIN D 25 HYDROXY: CPT

## 2025-03-14 PROCEDURE — 86803 HEPATITIS C AB TEST: CPT

## 2025-03-14 PROCEDURE — 80053 COMPREHEN METABOLIC PANEL: CPT

## 2025-03-14 NOTE — TELEPHONE ENCOUNTER
Patient called the RX Refill Line. Message is being forwarded to the office.     Patient is requesting TSH blood work paper be interred on my chart Patient had blood drawn this morning will hold for 2 hours.    Any questions Please contact patient at [phone]

## 2025-03-17 ENCOUNTER — TELEPHONE (OUTPATIENT)
Age: 60
End: 2025-03-17

## 2025-03-17 NOTE — TELEPHONE ENCOUNTER
Swetha would like to est care with  Cardio.    Please contact to advise if ok to schedule with attending.

## 2025-03-24 DIAGNOSIS — E03.9 ACQUIRED HYPOTHYROIDISM: ICD-10-CM

## 2025-03-24 NOTE — TELEPHONE ENCOUNTER
Pharmacy states that the refills have .     Reason for call:   [x] Refill   [] Prior Auth  [] Other:     Office:   [] PCP/Provider -   [x] Specialty/Provider - [facility]  Authorized By: Margoth Chatman MD      Medication: Synthroid 100 MCG tablet    Dose/Frequency:  Take 1 tablet (100 mcg total) by mouth daily,    Quantity: 90 tablet    Pharmacy: [place], [place] - [facility] [address]     [facility]   Does the patient have enough for 3 days?   [x] Yes   [] No - Send as HP to POD    [facility]   Does the patient have enough for 10 days?   [] Yes   [] No - Send as HP to POD

## 2025-03-24 NOTE — TELEPHONE ENCOUNTER
Patient is requesting a lab order be placed to check her thyroid levels. Patient has an upcoming appointment and would like to have the blood work done beforehand.

## 2025-03-25 DIAGNOSIS — E03.9 ACQUIRED HYPOTHYROIDISM: Primary | ICD-10-CM

## 2025-03-26 DIAGNOSIS — E03.9 ACQUIRED HYPOTHYROIDISM: ICD-10-CM

## 2025-03-26 RX ORDER — LEVOTHYROXINE SODIUM 100 MCG
100 TABLET ORAL DAILY
Qty: 90 TABLET | Refills: 0 | OUTPATIENT
Start: 2025-03-26

## 2025-03-26 RX ORDER — LEVOTHYROXINE SODIUM 100 MCG
100 TABLET ORAL DAILY
Qty: 90 TABLET | Refills: 1 | Status: SHIPPED | OUTPATIENT
Start: 2025-03-26

## 2025-03-27 DIAGNOSIS — E03.9 ACQUIRED HYPOTHYROIDISM: ICD-10-CM

## 2025-03-27 NOTE — TELEPHONE ENCOUNTER
Not duplicate  Sent to wrong pharmacy    Reason for call:   [x] Refill   [] Prior Auth  [] Other:     Office:   [] PCP/Provider -   [x] Specialty/Provider - endo    Medication: Synthroid 100 MCG tablet     Dose/Frequency: Take 1 tablet (100 mcg total) by mouth daily     Quantity: 90    Pharmacy: [place], [place] - [address]     [facility]   Does the patient have enough for 3 days?   [] Yes   [x] No - Send as HP to POD    [facility] Pharmacy   Does the patient have enough for 10 days?   [] Yes   [] No - Send as HP to POD

## 2025-04-02 RX ORDER — LEVOTHYROXINE SODIUM 100 MCG
100 TABLET ORAL DAILY
Qty: 90 TABLET | Refills: 0 | Status: SHIPPED | OUTPATIENT
Start: 2025-04-02

## 2025-04-03 ENCOUNTER — OFFICE VISIT (OUTPATIENT)
Dept: VASCULAR SURGERY | Facility: CLINIC | Age: 60
End: 2025-04-03

## 2025-04-03 VITALS
OXYGEN SATURATION: 99 % | DIASTOLIC BLOOD PRESSURE: 70 MMHG | HEART RATE: 69 BPM | HEIGHT: 64 IN | SYSTOLIC BLOOD PRESSURE: 100 MMHG | BODY MASS INDEX: 22.02 KG/M2 | WEIGHT: 129 LBS | RESPIRATION RATE: 18 BRPM

## 2025-04-03 DIAGNOSIS — I78.1 SPIDER VEINS: Primary | ICD-10-CM

## 2025-04-03 PROCEDURE — 99024 POSTOP FOLLOW-UP VISIT: CPT | Performed by: NURSE PRACTITIONER

## 2025-04-03 NOTE — ASSESSMENT & PLAN NOTE
Right anterolateral thigh spider veins treated with injection sclerotherapy 2/20/2025.  She returns to the office for evaluation of this area.  No response to sclerotherapy.  Telangiectasias remain the same.  No adverse effect or hyperpigmentation. Consider body make up to cover up. Would not inject again. Follow up PRN

## 2025-04-03 NOTE — PROGRESS NOTES
"Name: Swetha Zuniga      : 1965      MRN: [id number]  Encounter Provider: RITIKA Macdonald  Encounter Date: 4/3/2025   [facility] [place]  :  Assessment & Plan  Spider veins  Right anterolateral thigh spider veins treated with injection sclerotherapy 2025.  She returns to the office for evaluation of this area.  No response to sclerotherapy.  Telangiectasias remain the same.  No adverse effect or hyperpigmentation. Consider body make up to cover up. Would not inject again. Follow up PRN          History of Present Illness   Swetha Zuniga is a 60 y.o. female with bilateral lower extremity spider telangiectasias, hx of injection sclerotherapy years ago by Dr Gooden with sub optimal results, covered by tattoos and then subsequent laser tattoo removal right thigh. Treated right anterolateral spider veins treated with injection sclerotherapy 2025.  She returns to the office for evaluation of this area.  No change after sclerotherapy.  Telangiectasias remain the same.  No adverse effect or hyperpigmentation.    Patient had Asclera therapy on 25. Pt has not noticed an improvement in her spider veins.     History obtained from: patient    Review of Systems   Cardiovascular: Negative.    Musculoskeletal: Negative.    Skin: Negative.      Medical History Reviewed by provider this encounter:  Tobacco  Allergies  Meds  Problems  Med Hx  Surg Hx  Fam Hx     .     Objective   I have reviewed and made appropriate changes to the review of systems input by the medical assistant.    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    Hallux valgus, right    Constipation    Bloating    Symptomatic spider varicose vein    Chronic cough    Mild intermittent asthma without complication    Acute pain of left knee    Spider veins    Laryngopharyngeal reflux (LPR)    Environmental and seasonal allergies       Past Surgical History:   Procedure Laterality Date    ABDOMINAL ADHESION SURGERY      APPENDECTOMY      AUGMENTATION MAMMAPLASTY       SECTION  , ,     ECTOPIC PREGNANCY SURGERY      HYSTERECTOMY      partial, still have ovaries     OTHER SURGICAL HISTORY      abdominal lysis with a band put in at age 43    NJ EXC B9 LESION MRGN XCP SK TG S/N/H/F/G > 4.0CM N/A 2020    Procedure: CORRECTION UNFAVORABLE SCARRING/STANDING CONES CHIN AND NECK;  Surgeon: Gilberto Ramirez MD;  [facility];  Service: Plastics    NJ EXCISION INTERDIGITAL ANTUNEZ NEUROMA SINGLE EACH Left 2019    Procedure: EXCISION NEUROMA MORTONS;  Surgeon: James R Lachman, MD;  [facility];  Service: Orthopedics    NJ REPAIR COMPLEX F/C/C/M/N/AX/G/H/F 2.6-7.5 CM N/A 2020    Procedure: COMPLEX CLOSURE CHIN;  Surgeon: Gilberto Ramirez MD;  [facility];  Service: Plastics       Family History   Problem Relation Age of Onset    Hypertension Father     No Known Problems Sister     No Known Problems Sister     No Known Problems Maternal Grandmother     Stroke Maternal Grandfather     No Known Problems Paternal Grandmother     Prostate cancer Brother     Emphysema Paternal Uncle     Prostate cancer Other         runs strongly on father's side of family     No Known Problems Maternal Aunt     No Known Problems Maternal Aunt     No Known Problems Paternal Aunt        Social History     Socioeconomic History    Marital status: /Civil Union     Spouse name: Moy    Number of children: 3    Years of  education: Not on file    Highest education level: Not on file   Occupational History    Occupation: teacher   Tobacco Use    Smoking status: Never    Smokeless tobacco: Never   Vaping Use    Vaping status: Never Used   Substance and Sexual Activity    Alcohol use: Yes     Alcohol/week: 1.0 - 2.0 standard drink of alcohol     Types: 1 - 2 Glasses of wine per week     Comment: socially    Drug use: No    Sexual activity: Yes     Partners: Male   Other Topics Concern    Not on file   Social History Narrative    Most recent tobacco use screenin2019      Do you currently or have you served in the [employer]:   No      Caffeine intake:   Occasional  2 cups of coffee in AM     Guns present in home:   No      Seat belts used routinely:   Yes      Sunscreen used routinely:   Yes      Smoke alarm in home:   Yes      Advance directive:   Yes      Live alone or with others:   with others      Are there stairs in your home:   Yes      Pets:   Yes  dog      Social Drivers of Health     Financial Resource Strain: Not on file   Food Insecurity: Not on file   Transportation Needs: Not on file   Physical Activity: Not on file   Stress: Not on file   Social Connections: Not on file   Intimate Partner Violence: Not on file   Housing Stability: Not on file       Allergies   Allergen Reactions    Other Other (See Comments)     Environmental  Pet Dander         Current Outpatient Medications:     ascorbic Acid (VITAMIN C) 500 MG CPCR, Take 1 capsule by mouth daily, Disp: , Rfl:     azelastine (ASTELIN) 0.1 % nasal spray, 1 spray into each nostril 2 (two) times a day Use in each nostril as directed, Disp: 30 mL, Rfl: 1    B Complex Vitamins (VITAMIN B COMPLEX PO), Take 1 capsule by mouth daily in the early morning  , Disp: , Rfl:     B Complex-C CAPS, Take 1 capsule by mouth daily, Disp: , Rfl:     buPROPion (WELLBUTRIN SR) 100 mg 12 hr tablet, TAKE [facility] TABLET [facility] TW[facility] DAILY, Disp: , Rfl: 0    Calcium Carbonate  "(CALTRATE 600 PO), Take 1 capsule by mouth daily in the early morning  , Disp: , Rfl:     NON FORMULARY, Take 1 capsule by mouth every other day DMAE , Disp: , Rfl:     NON FORMULARY, Take 1 capsule by mouth daily Cayden Michael, Disp: , Rfl:     Synthroid 100 MCG tablet, Take 1 tablet (100 mcg total) by mouth daily, Disp: 90 tablet, Rfl: 0    Vitamin Mixture (PRANAV-C PO), Take 1,000 mg by mouth daily, Disp: , Rfl:     amoxicillin (AMOXIL) 875 mg tablet, , Disp: , Rfl:     benzonatate (TESSALON PERLES) 100 mg capsule, , Disp: , Rfl:     busPIRone (BUSPAR) 5 mg tablet, Take 5 mg by mouth 2 (two) times a day (Patient not taking: Reported on 2/20/2025), Disp: , Rfl:     clindamycin (CLEOCIN T) 1 % lotion, APPLY TO FACE TWICE A DAY (Patient not taking: Reported on 2/20/2024), Disp: , Rfl:     Coenzyme Q10 (CO Q 10) 100 MG CAPS, Take 1 capsule by mouth daily at bedtime   (Patient not taking: Reported on 3/14/2025), Disp: , Rfl:     doxycycline hyclate (VIBRAMYCIN) 100 mg capsule, , Disp: , Rfl:     fluticasone-vilanterol 200-25 mcg/actuation inhaler, INHALE ONE PUFF DAILY (Patient not taking: Reported on 2/20/2025), Disp: , Rfl:     levocetirizine (XYZAL) 5 MG tablet, Take 5 mg by mouth daily as needed for allergies  (Patient not taking: Reported on 2/20/2025), Disp: , Rfl:     scopolamine (TRANSDERM-SCOP) 1 mg/3 days TD 72 hr patch, Place 1 patch on the skin every third day, Disp: , Rfl:     Soolantra 1 % CREA, APPLY TO THE FACE AT BEDTIME (Patient not taking: Reported on 3/25/2024), Disp: , Rfl:    /70 (BP Location: Left arm, Patient Position: Sitting)   Pulse 69   Resp 18   Ht 5' 4\" (1.626 m)   Wt 58.5 kg (129 lb)   SpO2 99%   BMI 22.14 kg/m²      Physical Exam  Right anterolateral thigh spider telangiectasias. No large varicosities. No stasis changes   "

## 2025-04-11 DIAGNOSIS — E03.9 ACQUIRED HYPOTHYROIDISM: ICD-10-CM

## 2025-04-14 ENCOUNTER — OFFICE VISIT (OUTPATIENT)
Dept: INTERNAL MEDICINE CLINIC | Facility: CLINIC | Age: 60
End: 2025-04-14
Payer: COMMERCIAL

## 2025-04-14 VITALS
RESPIRATION RATE: 16 BRPM | BODY MASS INDEX: 21.51 KG/M2 | HEART RATE: 63 BPM | SYSTOLIC BLOOD PRESSURE: 110 MMHG | OXYGEN SATURATION: 100 % | DIASTOLIC BLOOD PRESSURE: 72 MMHG | HEIGHT: 64 IN | TEMPERATURE: 99.4 F | WEIGHT: 126 LBS

## 2025-04-14 DIAGNOSIS — Z12.11 COLON CANCER SCREENING: ICD-10-CM

## 2025-04-14 DIAGNOSIS — J02.9 ACUTE VIRAL PHARYNGITIS: ICD-10-CM

## 2025-04-14 DIAGNOSIS — Z11.4 SCREENING FOR HIV (HUMAN IMMUNODEFICIENCY VIRUS): ICD-10-CM

## 2025-04-14 DIAGNOSIS — E03.9 ACQUIRED HYPOTHYROIDISM: ICD-10-CM

## 2025-04-14 DIAGNOSIS — Z76.89 ENCOUNTER TO ESTABLISH CARE: ICD-10-CM

## 2025-04-14 DIAGNOSIS — D72.819 LEUKOPENIA, UNSPECIFIED TYPE: Primary | ICD-10-CM

## 2025-04-14 PROBLEM — T78.40XA ALLERGIES: Status: RESOLVED | Noted: 2022-03-29 | Resolved: 2025-04-14

## 2025-04-14 PROBLEM — T78.40XA ALLERGIES: Status: ACTIVE | Noted: 2022-03-29

## 2025-04-14 PROBLEM — E55.9 VITAMIN D DEFICIENCY: Status: ACTIVE | Noted: 2020-09-14

## 2025-04-14 PROBLEM — R14.0 BLOATING: Status: RESOLVED | Noted: 2020-02-25 | Resolved: 2025-04-14

## 2025-04-14 PROBLEM — K59.00 CONSTIPATION: Status: RESOLVED | Noted: 2019-06-14 | Resolved: 2025-04-14

## 2025-04-14 PROBLEM — M21.969 ACQUIRED DEFORMITY OF FOOT: Status: RESOLVED | Noted: 2018-09-17 | Resolved: 2025-04-14

## 2025-04-14 PROBLEM — M77.42 METATARSALGIA OF BOTH FEET: Status: RESOLVED | Noted: 2018-09-17 | Resolved: 2025-04-14

## 2025-04-14 PROBLEM — R00.2 PALPITATIONS: Status: RESOLVED | Noted: 2018-03-12 | Resolved: 2025-04-14

## 2025-04-14 PROBLEM — D72.810 LYMPHOCYTOPENIA: Status: ACTIVE | Noted: 2025-04-14

## 2025-04-14 PROBLEM — D36.13 NEUROMA OF FOOT: Status: RESOLVED | Noted: 2018-09-17 | Resolved: 2025-04-14

## 2025-04-14 PROBLEM — I78.1 SPIDER VEINS: Status: RESOLVED | Noted: 2024-05-09 | Resolved: 2025-04-14

## 2025-04-14 PROBLEM — M77.41 METATARSALGIA OF BOTH FEET: Status: RESOLVED | Noted: 2018-09-17 | Resolved: 2025-04-14

## 2025-04-14 PROBLEM — M25.562 ACUTE PAIN OF LEFT KNEE: Status: RESOLVED | Noted: 2022-04-25 | Resolved: 2025-04-14

## 2025-04-14 PROCEDURE — 99204 OFFICE O/P NEW MOD 45 MIN: CPT | Performed by: INTERNAL MEDICINE

## 2025-04-14 NOTE — PROGRESS NOTES
Name: Swetha Zuniga      : 1965      MRN: [id number]  Encounter Provider: Po Jimenes DO  Encounter Date: 2025   Encounter department: [facility] INTERNAL MEDICINE [place]  :  Assessment & Plan  Leukopenia, unspecified type  - chronic, ranges high 3's to low 4's  - will check differential, smear, HIV  - TSH ordered by endocrinology  - reassured patient that it is very mild and most likely nothing of concern, however we will do additional workup. If significant lymphopenia (less than 1500) or significant findings on smear, will need additional workup    Orders:    CBC and differential; Future    Peripheral Smear; Future    HIV 1/2 AG/AB w Reflex [facility] for 2 yr old and above; Future    Acute viral pharyngitis  - 1 day of right ear pain and sore throat, no fevers, worsening cough  - ear exam is normal, erythematous oropharynx  - recommended watching off of antibiotics, using warm tea with honey and lozenges        Colon cancer screening  - last colonoscopy in , repeat recommended for   - has had intermittent constipation for which she previously saw Dr. Goodson  - agreeable to see GI over the summer for repeat evaluation/colonoscopy    Orders:    Ambulatory Referral to Gastroenterology; Future    Screening for HIV (human immunodeficiency virus)    Orders:    HIV 1/2 AG/AB w Reflex [facility] for 2 yr old and above; Future    Acquired hypothyroidism  - follows with Dr Chatman  - her last TSH was normal, due for recheck  - continue synthroid 100mcg daily       Encounter to establish care  - her lipid panel is acceptable, with ascvd risk 1.6% - , HDL 61  - she follows with OB/GYN, who orders her mammograms  - she had a hysterectomy previously, but has 1 ovary retained  - her CMP was normal              History of Present Illness   Swetha Zuniga   Presents today to establish care.  She would like to discuss her recent blood work and her current symptoms.  She  "started having right ear and throat pain yesterday.  No worsening cough (she has a chronic cough), fever, ear drainage, muffled hearing.  No sick contacts that she knows about but she does work at the school district.    She is concerned on her labs that she has had chronic leukopenia.  She feels like she has been getting sicker over the last year or so with bronchitis and sinusitis.  Her last colonoscopy in 2000 did show polyps and was recommended to repeat in 5 years (2023) but she has been putting it off due to intermittent constipation.  She is concerned that it could make her constipation worse.    She does eat very healthy and stays up-to-date with her breast cancer screening with her OB/GYN.  She had a hysterectomy and right-sided salpingo-oophorectomy.  Follows with Dr. Chatman in endocrinology for hypothyroidism.  She also follows with psychiatry.      Review of Systems   HENT:  Positive for ear pain and sore throat.    Gastrointestinal:  Positive for constipation.   All other systems reviewed and are negative.      Objective   /72 (BP Location: Left arm, Patient Position: Sitting, Cuff Size: Adult)   Pulse 63   Temp 99.4 °F (37.4 °C) (Tympanic)   Resp 16   Ht 5' 4\" (1.626 m)   Wt 57.2 kg (126 lb)   SpO2 100%   BMI 21.63 kg/m²      Physical Exam  Constitutional:       General: She is not in acute distress.     Appearance: She is normal weight. She is not ill-appearing.   HENT:      Right Ear: Tympanic membrane, ear canal and external ear normal. There is no impacted cerumen.      Left Ear: Tympanic membrane, ear canal and external ear normal. There is no impacted cerumen.      Mouth/Throat:      Mouth: Mucous membranes are moist.      Pharynx: Posterior oropharyngeal erythema present. No oropharyngeal exudate.   Eyes:      Extraocular Movements: Extraocular movements intact.      Conjunctiva/sclera: Conjunctivae normal.      Pupils: Pupils are equal, round, and reactive to light. "   Cardiovascular:      Rate and Rhythm: Normal rate and regular rhythm.      Heart sounds: No murmur heard.  Pulmonary:      Effort: Pulmonary effort is normal. No respiratory distress.      Breath sounds: Normal breath sounds.   Abdominal:      General: Abdomen is flat.      Palpations: Abdomen is soft.   Musculoskeletal:      Right lower leg: No edema.      Left lower leg: No edema.   Skin:     General: Skin is warm and dry.   Neurological:      Mental Status: She is alert. Mental status is at baseline.   Psychiatric:         Mood and Affect: Mood normal.         Behavior: Behavior normal.

## 2025-04-14 NOTE — ASSESSMENT & PLAN NOTE
- follows with Dr Chatman  - her last TSH was normal, due for recheck  - continue synthroid 100mcg daily

## 2025-04-15 ENCOUNTER — APPOINTMENT (OUTPATIENT)
Dept: LAB | Facility: CLINIC | Age: 60
End: 2025-04-15
Attending: INTERNAL MEDICINE
Payer: COMMERCIAL

## 2025-04-15 DIAGNOSIS — Z11.4 SCREENING FOR HIV (HUMAN IMMUNODEFICIENCY VIRUS): ICD-10-CM

## 2025-04-15 DIAGNOSIS — E03.9 ACQUIRED HYPOTHYROIDISM: ICD-10-CM

## 2025-04-15 DIAGNOSIS — D72.819 LEUKOPENIA, UNSPECIFIED TYPE: ICD-10-CM

## 2025-04-15 LAB
EOSINOPHIL # BLD AUTO: 0.2 THOUSAND/UL (ref 0–0.61)
EOSINOPHIL NFR BLD MANUAL: 3 % (ref 0–6)
ERYTHROCYTE [DISTWIDTH] IN BLOOD BY AUTOMATED COUNT: 11.9 % (ref 11.6–15.1)
HCT VFR BLD AUTO: 39.2 % (ref 34.8–46.1)
HGB BLD-MCNC: 12.6 G/DL (ref 11.5–15.4)
LYMPHOCYTES # BLD AUTO: 0.81 THOUSAND/UL (ref 0.6–4.47)
LYMPHOCYTES # BLD AUTO: 12 %
MCH RBC QN AUTO: 30.3 PG (ref 26.8–34.3)
MCHC RBC AUTO-ENTMCNC: 32.1 G/DL (ref 31.4–37.4)
MCV RBC AUTO: 94 FL (ref 82–98)
MONOCYTES # BLD AUTO: 0.88 THOUSAND/UL (ref 0–1.22)
MONOCYTES NFR BLD AUTO: 13 % (ref 4–12)
NEUTS BAND NFR BLD MANUAL: 1 % (ref 0–8)
NEUTS SEG # BLD: 4.89 THOUSAND/UL (ref 1.81–6.82)
NEUTS SEG NFR BLD AUTO: 71 %
NRBC BLD AUTO-RTO: 0 /100 WBCS
PLATELET # BLD AUTO: 143 THOUSANDS/UL (ref 149–390)
PLATELET BLD QL SMEAR: ADEQUATE
PMV BLD AUTO: 9.9 FL (ref 8.9–12.7)
RBC # BLD AUTO: 4.16 MILLION/UL (ref 3.81–5.12)
RBC MORPH BLD: NORMAL
T4 FREE SERPL-MCNC: 0.91 NG/DL (ref 0.61–1.12)
TOTAL CELLS COUNTED SPEC: 100
TSH SERPL DL<=0.05 MIU/L-ACNC: 0.58 UIU/ML (ref 0.45–4.5)
WBC # BLD AUTO: 6.79 THOUSAND/UL (ref 4.31–10.16)

## 2025-04-15 PROCEDURE — 87389 HIV-1 AG W/HIV-1&-2 AB AG IA: CPT

## 2025-04-15 PROCEDURE — 84443 ASSAY THYROID STIM HORMONE: CPT

## 2025-04-15 PROCEDURE — 84439 ASSAY OF FREE THYROXINE: CPT

## 2025-04-15 PROCEDURE — 36415 COLL VENOUS BLD VENIPUNCTURE: CPT

## 2025-04-15 PROCEDURE — 85007 BL SMEAR W/DIFF WBC COUNT: CPT

## 2025-04-16 ENCOUNTER — RESULTS FOLLOW-UP (OUTPATIENT)
Dept: INTERNAL MEDICINE CLINIC | Facility: CLINIC | Age: 60
End: 2025-04-16

## 2025-04-16 LAB — HIV 1+2 AB+HIV1 P24 AG SERPL QL IA: NORMAL

## 2025-04-17 ENCOUNTER — RESULTS FOLLOW-UP (OUTPATIENT)
Dept: OTHER | Facility: HOSPITAL | Age: 60
End: 2025-04-17

## 2025-04-28 ENCOUNTER — OFFICE VISIT (OUTPATIENT)
Dept: INTERNAL MEDICINE CLINIC | Facility: CLINIC | Age: 60
End: 2025-04-28
Payer: COMMERCIAL

## 2025-04-28 VITALS
HEIGHT: 64 IN | BODY MASS INDEX: 21.68 KG/M2 | WEIGHT: 127 LBS | TEMPERATURE: 99.9 F | RESPIRATION RATE: 16 BRPM | OXYGEN SATURATION: 98 % | DIASTOLIC BLOOD PRESSURE: 60 MMHG | HEART RATE: 62 BPM | SYSTOLIC BLOOD PRESSURE: 92 MMHG

## 2025-04-28 DIAGNOSIS — R05.9 COUGH, UNSPECIFIED TYPE: Primary | ICD-10-CM

## 2025-04-28 PROCEDURE — 99213 OFFICE O/P EST LOW 20 MIN: CPT

## 2025-04-28 RX ORDER — FAMOTIDINE 20 MG/1
20 TABLET, FILM COATED ORAL 2 TIMES DAILY
Qty: 180 TABLET | Refills: 3 | Status: SHIPPED | OUTPATIENT
Start: 2025-04-28 | End: 2026-04-23

## 2025-04-28 RX ORDER — LEVOCETIRIZINE DIHYDROCHLORIDE 5 MG/1
5 TABLET, FILM COATED ORAL EVERY EVENING
Qty: 30 TABLET | Refills: 0 | Status: SHIPPED | OUTPATIENT
Start: 2025-04-28

## 2025-04-28 RX ORDER — FLUTICASONE PROPIONATE 50 MCG
1 SPRAY, SUSPENSION (ML) NASAL DAILY
Qty: 9.9 ML | Refills: 0 | Status: SHIPPED | OUTPATIENT
Start: 2025-04-28 | End: 2025-05-12

## 2025-04-29 NOTE — PROGRESS NOTES
INTERNAL MEDICINE FOLLOW-UP OFFICE VISIT  [facility] INTERNAL MEDICINE [place]    NAME: Swetha Zuniga  AGE: 60 y.o. SEX: female  : 1965     DATE: 2025     Assessment and Plan:     1. Cough, unspecified type (Primary)   Symptoms could be due to post viral cough, bronchitis, GERD vs allergies  Discussed with patient that we will start her on Flonase daily, Pepcid 20 mg daily for two weeks and to restart her Xyzal 5 mg daily  - levocetirizine (XYZAL) 5 MG tablet; Take 1 tablet (5 mg total) by mouth every evening  Dispense: 30 tablet; Refill: 0  - famotidine (PEPCID) 20 mg tablet; Take 1 tablet (20 mg total) by mouth 2 (two) times a day  Dispense: 180 tablet; Refill: 3  - fluticasone (FLONASE) 50 mcg/act nasal spray; 1 spray into each nostril daily for 14 days  Dispense: 9.9 mL; Refill: 0  - dextromethorphan-guaifenesin (MUCINEX DM)  MG per 12 hr tablet; Take 1 tablet by mouth every 12 (twelve) hours for 14 days  Dispense: 28 tablet; Refill: 0    No follow-ups on file.     Chief Complaint:     Chief Complaint   Patient presents with    Sinus Problem     Just returned from [facility] and has a sinus and ear issues        History of Present Illness:     Ms. Aleman presents for a sick visit. Reports a one month hx of cough, a sensation of fullness in her ears bilaterally. Was recently in [facility] for radha and was seen in the ER their, prescribed Augmentin for 12, nasal spray and allergy medicines. Chest xray was normal. Was seen by Pulmonology last year for cough, work up was unremarkable and patient was started on a xyzal  and referred to Allergy&Immunology for which she has an appointment next weeks    The following portions of the patient's history were reviewed and updated as appropriate: allergies, current medications, past family history, past medical history, past social history, past surgical history and problem list.     Review of Systems:     Review of  "Systems   Constitutional:  Negative for chills and fever.   HENT:  Negative for ear pain and sore throat.         Fullness in ears   Eyes:  Negative for pain and visual disturbance.   Respiratory:  Positive for cough. Negative for shortness of breath.    Cardiovascular:  Negative for chest pain and palpitations.   Gastrointestinal:  Negative for abdominal pain and vomiting.   Genitourinary:  Negative for dysuria and hematuria.   Musculoskeletal:  Negative for arthralgias and back pain.   Skin:  Negative for color change and rash.   Neurological:  Negative for seizures and syncope.   All other systems reviewed and are negative.       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   Constitutional:       General: She is not in acute distress.     Appearance: She is well-developed.   HENT:      Head: Normocephalic and atraumatic.      Right Ear: Tympanic membrane, ear canal and external ear normal.      Left Ear: Tympanic membrane, ear canal and external ear normal.   Eyes:      Conjunctiva/sclera: Conjunctivae normal.   Cardiovascular:      Rate and Rhythm: Normal rate and regular rhythm.      Heart sounds: No murmur heard.  Pulmonary:      Effort: Pulmonary effort is normal. No respiratory distress.      Breath sounds: " Normal breath sounds.   Abdominal:      Palpations: Abdomen is soft.      Tenderness: There is no abdominal tenderness.   Musculoskeletal:         General: No swelling.      Cervical back: Neck supple.   Skin:     General: Skin is warm and dry.      Capillary Refill: Capillary refill takes less than 2 seconds.   Neurological:      Mental Status: She is alert.   Psychiatric:         Mood and Affect: Mood normal.         Pertinent Laboratory/Diagnostic Studies:    Laboratory Results: I have personally reviewed the pertinent laboratory results/reports     CBC:   Results from Last 12 Months   Lab Units 04/15/25  1533 03/14/25  0803 05/15/24  1612   WBC Thousand/uL 6.79   < > 4.25*   RBC Million/uL 4.16   < > 4.19   HEMOGLOBIN g/dL 12.6   < > 13.3   HEMATOCRIT % 39.2   < > 40.6   MCV fL 94   < > 97   MCH pg 30.3   < > 31.7   MCHC g/dL 32.1   < > 32.8   RDW % 11.9   < > 11.7   MPV fL 9.9   < > 10.0   PLATELETS Thousands/uL 143*   < > 175   NRBC AUTO /100 WBCs 0  --  0   SEGS PCT %  --   --  48   LYMPHO PCT % 12  --  38   MONO PCT % 13*  --  11   EOS PCT % 3  --  2   BASOS PCT %  --   --  1   TOTAL NEUT ABS Thousand/uL 4.89  --  2.03   LYMPHS ABS Thousand/uL 0.81  --  1.61   MONOS ABS Thousand/µL  --   --  0.46   EOS ABS Thousand/uL 0.20  --  0.10    < > = values in this interval not displayed.     Chemistry Profile:   Results from Last 12 Months   Lab Units 03/14/25  0803   POTASSIUM mmol/L 3.5   CHLORIDE mmol/L 105   CO2 mmol/L 31   BUN mg/dL 19   CREATININE mg/dL 0.79   GLUCOSE FASTING mg/dL 86   CALCIUM mg/dL 9.2   AST U/L 33   ALT U/L 38   ALK PHOS U/L 60   EGFR ml/min/1.73sq m 82       Radiology/Other Diagnostic Testing Results:     Maame Pelaez MD  [facility] INTERNAL MEDICINE [place]

## 2025-05-09 ENCOUNTER — OFFICE VISIT (OUTPATIENT)
Dept: ENDOCRINOLOGY | Facility: CLINIC | Age: 60
End: 2025-05-09
Payer: COMMERCIAL

## 2025-05-09 VITALS
OXYGEN SATURATION: 98 % | SYSTOLIC BLOOD PRESSURE: 130 MMHG | DIASTOLIC BLOOD PRESSURE: 90 MMHG | BODY MASS INDEX: 21.75 KG/M2 | HEART RATE: 53 BPM | HEIGHT: 64 IN | TEMPERATURE: 97.7 F | WEIGHT: 127.4 LBS

## 2025-05-09 DIAGNOSIS — E55.9 VITAMIN D DEFICIENCY: ICD-10-CM

## 2025-05-09 DIAGNOSIS — E03.9 ACQUIRED HYPOTHYROIDISM: Primary | ICD-10-CM

## 2025-05-09 PROCEDURE — 99214 OFFICE O/P EST MOD 30 MIN: CPT | Performed by: INTERNAL MEDICINE

## 2025-05-09 RX ORDER — LEVOTHYROXINE SODIUM 100 MCG
100 TABLET ORAL DAILY
Qty: 90 TABLET | Refills: 0 | Status: SHIPPED | OUTPATIENT
Start: 2025-05-09

## 2025-05-09 NOTE — ASSESSMENT & PLAN NOTE
She seems clinically and biochemically euthyroid. Weight is same as last year.    Continue levothyroxine 100mcg qdaily.

## 2025-05-15 ENCOUNTER — TELEPHONE (OUTPATIENT)
Age: 60
End: 2025-05-15

## 2025-05-15 ENCOUNTER — TELEPHONE (OUTPATIENT)
Dept: INTERNAL MEDICINE CLINIC | Facility: CLINIC | Age: 60
End: 2025-05-15

## 2025-05-15 DIAGNOSIS — R05.3 CHRONIC COUGH: Primary | ICD-10-CM

## 2025-05-15 DIAGNOSIS — T78.40XA ALLERGY, INITIAL ENCOUNTER: Primary | ICD-10-CM

## 2025-05-15 DIAGNOSIS — R05.9 COUGH, UNSPECIFIED TYPE: ICD-10-CM

## 2025-05-15 RX ORDER — FLUTICASONE PROPIONATE 50 MCG
1 SPRAY, SUSPENSION (ML) NASAL DAILY
Qty: 9.9 ML | Refills: 1 | Status: SHIPPED | OUTPATIENT
Start: 2025-05-15 | End: 2025-06-14

## 2025-05-15 RX ORDER — OMEPRAZOLE 40 MG/1
40 CAPSULE, DELAYED RELEASE ORAL DAILY
Qty: 60 CAPSULE | Refills: 2 | Status: SHIPPED | OUTPATIENT
Start: 2025-05-15

## 2025-05-15 RX ORDER — LEVOCETIRIZINE DIHYDROCHLORIDE 5 MG/1
5 TABLET, FILM COATED ORAL EVERY EVENING
Qty: 30 TABLET | Refills: 1 | Status: SHIPPED | OUTPATIENT
Start: 2025-05-15

## 2025-05-15 NOTE — TELEPHONE ENCOUNTER
Received inbox message regarding persistence of symptoms following recent visits in April. Per pt and chart review, she was previously treated for strep throat in early April, then seen again for persistence of cough in late April, at which time she was prescribed famotidine, levocetirizine, fluticasone nasal spray, and guaifenesin-dextromethorphan. Pt reports that her symptoms improved mildly while taking medications, but have returned since stopping 2-3 days ago. She also complains of worsening pain in her throat (4/10 in severity) and in her L ear (6/10 in severity), which had also been complaints during recent office visits. At this time, she denies fever, chills, difficulty swallowing, difficulty breathing, decreased hearing, tinnitus. She reports that cough has been ongoing for greater than 1 year. She has an appointment with an allergist in 3 weeks.   At this time, recommend 8 week trial of omeprazole for possible GERD as cause of chronic cough. Also recommend resuming levocetirizine and fluticasone.  With respect to sore throat and ear pain, review of systems is reassuring, discussed that further treatment recommendations would require evaluation in clinic. Pt understands, does not wish to schedule an appointment at this time, and will call to schedule appointment if symptoms worsen or if she develops new symptoms such as fever.

## 2025-05-15 NOTE — TELEPHONE ENCOUNTER
Swetha wanted to provide an update on her symptoms from her last visit.     Stated she took all of the recommended medications for the advised length of time. She stopped about 2-3 days ago and is again experiencing the cough and throat/ear discomfort.    Swetha would like to know if she should start the medication again or if there's something else she should take/do.    Please contact to advise.

## 2025-06-03 ENCOUNTER — OFFICE VISIT (OUTPATIENT)
Dept: INTERNAL MEDICINE CLINIC | Facility: CLINIC | Age: 60
End: 2025-06-03
Payer: COMMERCIAL

## 2025-06-03 VITALS
HEART RATE: 52 BPM | HEIGHT: 64 IN | DIASTOLIC BLOOD PRESSURE: 62 MMHG | OXYGEN SATURATION: 100 % | SYSTOLIC BLOOD PRESSURE: 104 MMHG | TEMPERATURE: 98.7 F | RESPIRATION RATE: 14 BRPM | BODY MASS INDEX: 20.83 KG/M2 | WEIGHT: 122 LBS

## 2025-06-03 DIAGNOSIS — Z00.00 ANNUAL PHYSICAL EXAM: Primary | ICD-10-CM

## 2025-06-03 PROCEDURE — 99396 PREV VISIT EST AGE 40-64: CPT

## 2025-06-03 NOTE — PROGRESS NOTES
Adult Annual Physical  Name: Swetha Zuniga      : 1965      MRN: [id number]  Encounter Provider: Maame Pelaez MD  Encounter Date: 6/3/2025   Encounter department: [facility] INTERNAL MEDICINE [place]    :  Assessment & Plan  Annual physical exam  Patient will obtain order for screening mammogram from her [facility]  Referral to GI for colonoscopy provided  Patient is up to  date on her vaccine  Will continue to monitor her CBC for leukopenia   F/u in one year   Patient will bring ACP documents in   Orders:    Ambulatory Referral to Gastroenterology; Future        Preventive Screenings:  - Diabetes Screening: screening up-to-date  - Hepatitis C screening: screening up-to-date   - HIV screening: screening up-to-date   - Breast cancer screening: screening up-to-date   - Colon cancer screening: screening up-to-date   - Lung cancer screening: screening not indicated     Immunizations:  - Immunizations due: Prevnar 20         History of Present Illness     Adult Annual Physical:  Patient presents for annual physical.     Diet and Physical Activity:  - Diet/Nutrition: well balanced diet.  - Exercise: vigorous cardiovascular exercise, 5-7 times a week on average and 1-2 hours on average.    General Health:  - Sleep: sleeps well.  - Hearing: normal hearing bilateral ears.  - Vision: no vision problems and wears glasses.  - Dental: regular dental visits, brushes teeth twice daily and floss regularly.    Review of Systems   Constitutional:  Negative for chills and fever.   HENT:  Negative for ear pain and sore throat.    Eyes:  Negative for pain and visual disturbance.   Respiratory:  Positive for cough. Negative for shortness of breath.    Cardiovascular:  Negative for chest pain and palpitations.   Gastrointestinal:  Negative for abdominal pain and vomiting.   Genitourinary:  Negative for dysuria and hematuria.   Musculoskeletal:  Negative for arthralgias and back pain.   Skin:  Negative for  "color change and rash.   Neurological:  Negative for seizures and syncope.   All other systems reviewed and are negative.        Objective   /62 (BP Location: Left arm, Patient Position: Sitting, Cuff Size: Adult)   Pulse (!) 52   Temp 98.7 °F (37.1 °C) (Tympanic)   Resp 14   Ht 5' 4\" (1.626 m)   Wt 55.3 kg (122 lb)   SpO2 100%   BMI 20.94 kg/m²     Physical Exam  Vitals and nursing note reviewed.   Constitutional:       General: She is not in acute distress.     Appearance: She is well-developed.   HENT:      Head: Normocephalic and atraumatic.     Eyes:      Conjunctiva/sclera: Conjunctivae normal.       Cardiovascular:      Rate and Rhythm: Normal rate and regular rhythm.      Heart sounds: No murmur heard.  Pulmonary:      Effort: Pulmonary effort is normal. No respiratory distress.      Breath sounds: Normal breath sounds.   Abdominal:      Palpations: Abdomen is soft.      Tenderness: There is no abdominal tenderness.     Musculoskeletal:         General: No swelling.      Cervical back: Neck supple.     Skin:     General: Skin is warm and dry.      Capillary Refill: Capillary refill takes less than 2 seconds.     Neurological:      Mental Status: She is alert.     Psychiatric:         Mood and Affect: Mood normal.         "

## 2025-06-03 NOTE — PATIENT INSTRUCTIONS
"Patient Education     Routine physical for adults   The Basics   Written by the doctors and editors at [facility]   What is a physical? -- A physical is a routine visit, or \"check-up,\" with your doctor. You might also hear it called a \"wellness visit\" or \"preventive visit.\"  During each visit, the doctor will:   Ask about your physical and mental health   Ask about your habits, behaviors, and lifestyle   Do an exam   Give you vaccines if needed   Talk to you about any medicines you take   Give advice about your health   Answer your questions  Getting regular check-ups is an important part of taking care of your health. It can help your doctor find and treat any problems you have. But it's also important for preventing health problems.  A routine physical is different from a \"sick visit.\" A sick visit is when you see a doctor because of a health concern or problem. Since physicals are scheduled ahead of time, you can think about what you want to ask the doctor.  How often should I get a physical? -- It depends on your age and health. In general, for people age 21 years and older:   If you are younger than 50 years, you might be able to get a physical every 3 years.   If you are 50 years or older, your doctor might recommend a physical every year.  If you have an ongoing health condition, like diabetes or high blood pressure, your doctor will probably want to see you more often.  What happens during a physical? -- In general, each visit will include:   Physical exam - The doctor or nurse will check your height, weight, heart rate, and blood pressure. They will also look at your eyes and ears. They will ask about how you are feeling and whether you have any symptoms that bother you.   Medicines - It's a good idea to bring a list of all the medicines you take to each doctor visit. Your doctor will talk to you about your medicines and answer any questions. Tell them if you are having any side effects that bother you. You " "should also tell them if you are having trouble paying for any of your medicines.   Habits and behaviors - This includes:   Your diet   Your exercise habits   Whether you smoke, drink alcohol, or use drugs   Whether you are sexually active   Whether you feel safe at home  Your doctor will talk to you about things you can do to improve your health and lower your risk of health problems. They will also offer help and support. For example, if you want to quit smoking, they can give you advice and might prescribe medicines. If you want to improve your diet or get more physical activity, they can help you with this, too.   Lab tests, if needed - The tests you get will depend on your age and situation. For example, your doctor might want to check your:   Cholesterol   Blood sugar   Iron level   Vaccines - The recommended vaccines will depend on your age, health, and what vaccines you already had. Vaccines are very important because they can prevent certain serious or deadly infections.   Discussion of screening - \"Screening\" means checking for diseases or other health problems before they cause symptoms. Your doctor can recommend screening based on your age, risk, and preferences. This might include tests to check for:   Cancer, such as breast, prostate, cervical, ovarian, colorectal, prostate, lung, or skin cancer   Sexually transmitted infections, such as chlamydia and gonorrhea   Mental health conditions like depression and anxiety  Your doctor will talk to you about the different types of screening tests. They can help you decide which screenings to have. They can also explain what the results might mean.   Answering questions - The physical is a good time to ask the doctor or nurse questions about your health. If needed, they can refer you to other doctors or specialists, too.  Adults older than 65 years often need other care, too. As you get older, your doctor will talk to you about:   How to prevent falling at " home   Hearing or vision tests   Memory testing   How to take your medicines safely   Making sure that you have the help and support you need at home  All topics are updated as new evidence becomes available and our peer review process is complete.  This topic retrieved from [employer] on: May 02, 2024.  Topic 439557 Version 1.0  Release: 32.4.3 - C32.122  © 2024 UpToDate, Inc. and/or its affiliates. All rights reserved.  Consumer Information Use and Disclaimer   Disclaimer: This generalized information is a limited summary of diagnosis, treatment, and/or medication information. It is not meant to be comprehensive and should be used as a tool to help the user understand and/or assess potential diagnostic and treatment options. It does NOT include all information about conditions, treatments, medications, side effects, or risks that may apply to a specific patient. It is not intended to be medical advice or a substitute for the medical advice, diagnosis, or treatment of a health care provider based on the health care provider's examination and assessment of a patient's specific and unique circumstances. Patients must speak with a health care provider for complete information about their health, medical questions, and treatment options, including any risks or benefits regarding use of medications. This information does not endorse any treatments or medications as safe, effective, or approved for treating a specific patient. UpToDate, Inc. and its affiliates disclaim any warranty or liability relating to this information or the use thereof.The use of this information is governed by the Terms of Use, available at https://www.[employer].com/en/know/clinical-effectiveness-terms. 2024© UpToDate, Inc. and its affiliates and/or licensors. All rights reserved.  Copyright   © 2024 UpToDate, Inc. and/or its affiliates. All rights reserved.

## 2025-06-11 ENCOUNTER — PREP FOR PROCEDURE (OUTPATIENT)
Age: 60
End: 2025-06-11

## 2025-06-11 ENCOUNTER — TELEPHONE (OUTPATIENT)
Age: 60
End: 2025-06-11

## 2025-06-11 ENCOUNTER — ANNUAL EXAM (OUTPATIENT)
Dept: OBGYN CLINIC | Facility: CLINIC | Age: 60
End: 2025-06-11
Payer: COMMERCIAL

## 2025-06-11 VITALS
WEIGHT: 121 LBS | BODY MASS INDEX: 20.66 KG/M2 | DIASTOLIC BLOOD PRESSURE: 76 MMHG | HEIGHT: 64 IN | SYSTOLIC BLOOD PRESSURE: 110 MMHG

## 2025-06-11 DIAGNOSIS — Z01.419 ENCOUNTER FOR GYNECOLOGICAL EXAMINATION WITHOUT ABNORMAL FINDING: Primary | ICD-10-CM

## 2025-06-11 DIAGNOSIS — Z12.11 SCREENING FOR COLON CANCER: Primary | ICD-10-CM

## 2025-06-11 DIAGNOSIS — Z12.31 SCREENING MAMMOGRAM, ENCOUNTER FOR: ICD-10-CM

## 2025-06-11 PROCEDURE — S0612 ANNUAL GYNECOLOGICAL EXAMINA: HCPCS | Performed by: OBSTETRICS & GYNECOLOGY

## 2025-06-11 PROCEDURE — G0145 SCR C/V CYTO,THINLAYER,RESCR: HCPCS | Performed by: OBSTETRICS & GYNECOLOGY

## 2025-06-11 PROCEDURE — G0476 HPV COMBO ASSAY CA SCREEN: HCPCS | Performed by: OBSTETRICS & GYNECOLOGY

## 2025-06-11 NOTE — TELEPHONE ENCOUNTER
06/11/25  Screened by: Sharon Lujan    Referring Provider     Pre- Screening:     There is no height or weight on file to calculate BMI.20.77  Ht-5'4  Wt-121lbs    Has patient been referred for a routine screening Colonoscopy? yes  Is the patient between 45-75 years old? yes      Previous Colonoscopy yes   If yes:    Date:     Facility:     Reason:       Does the patient want to see a Gastroenterologist prior to their procedure OR are they having any GI symptoms? no    Has the patient been hospitalized or had abdominal surgery in the past 6 months? no    Does the patient use supplemental oxygen? no    Does the patient take Coumadin, Lovenox, Plavix, Elliquis, Xarelto, or other blood thinning medication? no    Has the patient had a stroke, cardiac event, or stent placed in the past year? no    If patient is between 45yrs - 49yrs, please advise patient that we will have to confirm benefits & coverage with their insurance company for a routine screening colonoscopy.

## 2025-06-11 NOTE — TELEPHONE ENCOUNTER
Scheduled date of colonoscopy (as of today):9/19/2025  Physician performing colonoscopy:Dr. Mustafa  Location of colonoscopy:[facility]  Bowel prep reviewed with patient:sergo/dul   Instructions reviewed with patient by:BRI-Sergo/Dul prep instructions sent via email to [email or web address]   Clearances: n/a

## 2025-06-11 NOTE — PROGRESS NOTES
Name: Swetha Zuniga      : 1965      MRN: [id number]  Encounter Provider: Sherine Platt MD  Encounter Date: 2025   Encounter department: [facility] [place] [facility]  :  Assessment & Plan  Encounter for gynecological examination without abnormal finding             History of Present Illness   HPI  Swetha Zuniga is a 60 y.o.   4 para 3013 who underwent hysterectomy for benign indications several years ago.  She presents for annual exam.  She has no complaints related to OBGYN.  She has had no medical changes or problems over the past year.  She has been in very good health.  She exercises regularly.  She is due for mammogram in 2024 and will schedule 1 following her anniversary date.  She has had several normal Pap smears following the hysterectomy and does not need a Pap smear today.  Will see her back in 1 year or as needed.           The following portions of the patient's history were reviewed and updated as appropriate: allergies, current medications, past family history, past medical history, past social history, past surgical history, and problem list.      Review of Systems   Constitutional:  Negative for chills, diaphoresis, fatigue, fever and unexpected weight change.   HENT:  Negative for congestion, sinus pressure, sinus pain, tinnitus and trouble swallowing.    Eyes:  Negative for visual disturbance.   Respiratory:  Negative for cough, chest tightness and shortness of breath.    Cardiovascular:  Negative for chest pain, palpitations and leg swelling.   Gastrointestinal:  Negative for abdominal distention, abdominal pain, anal bleeding, constipation, diarrhea, nausea, rectal pain and vomiting.   Endocrine: Negative for heat intolerance.   Genitourinary:  Negative for difficulty urinating, dysuria, flank pain, frequency, genital sores, hematuria and urgency.   Musculoskeletal:  Negative for arthralgias, back pain and joint swelling.   Skin:  Negative  "for rash.   Allergic/Immunologic: Negative for environmental allergies and food allergies.   Neurological:  Negative for headaches.   Hematological:  Negative for adenopathy. Does not bruise/bleed easily.   Psychiatric/Behavioral:  Negative for decreased concentration and dysphoric mood. The patient is not nervous/anxious.           Objective   Ht 5' 4\" (1.626 m)   Wt 54.9 kg (121 lb)   BMI 20.77 kg/m²      Physical Exam  Vitals reviewed. Exam conducted with a chaperone present.   Constitutional:       Appearance: Normal appearance.   HENT:      Mouth/Throat:      Mouth: Mucous membranes are moist.      Pharynx: Oropharynx is clear.     Eyes:      Conjunctiva/sclera: Conjunctivae normal.      Pupils: Pupils are equal, round, and reactive to light.       Cardiovascular:      Rate and Rhythm: Normal rate and regular rhythm.      Pulses: Normal pulses.      Heart sounds: Normal heart sounds.   Pulmonary:      Effort: Pulmonary effort is normal.      Breath sounds: Normal breath sounds.   Abdominal:      General: Bowel sounds are normal.      Palpations: Abdomen is soft.   Genitourinary:     General: Normal vulva.      Exam position: Lithotomy position.      Guero stage (genital): 5.      Vagina: Normal.      Uterus: Absent.       Adnexa: Right adnexa normal and left adnexa normal.      Rectum: Normal.     Musculoskeletal:         General: Normal range of motion.      Cervical back: Neck supple.     Skin:     General: Skin is warm and dry.     Neurological:      General: No focal deficit present.      Mental Status: She is alert and oriented to person, place, and time.     Psychiatric:         Mood and Affect: Mood normal.           "

## 2025-06-14 PROBLEM — J30.89 ALLERGIC RHINITIS DUE TO HOUSE DUST MITE: Status: ACTIVE | Noted: 2025-06-14

## 2025-06-14 PROBLEM — J30.1 CHRONIC SEASONAL ALLERGIC RHINITIS DUE TO POLLEN: Status: ACTIVE | Noted: 2025-06-14

## 2025-06-16 LAB
LAB AP GYN PRIMARY INTERPRETATION: NORMAL
Lab: NORMAL

## 2025-06-17 ENCOUNTER — RESULTS FOLLOW-UP (OUTPATIENT)
Dept: OBGYN CLINIC | Facility: CLINIC | Age: 60
End: 2025-06-17

## 2025-06-24 ENCOUNTER — OFFICE VISIT (OUTPATIENT)
Dept: OBGYN CLINIC | Facility: CLINIC | Age: 60
End: 2025-06-24
Payer: COMMERCIAL

## 2025-06-24 VITALS — WEIGHT: 123 LBS | BODY MASS INDEX: 21.79 KG/M2 | HEIGHT: 63 IN

## 2025-06-24 DIAGNOSIS — Z01.89 ENCOUNTER FOR LOWER EXTREMITY COMPARISON IMAGING STUDY: ICD-10-CM

## 2025-06-24 DIAGNOSIS — M79.672 PAIN IN LEFT FOOT: ICD-10-CM

## 2025-06-24 DIAGNOSIS — S92.512A CLOSED DISPLACED FRACTURE OF PROXIMAL PHALANX OF LESSER TOE OF LEFT FOOT, INITIAL ENCOUNTER: Primary | ICD-10-CM

## 2025-06-24 PROCEDURE — 99203 OFFICE O/P NEW LOW 30 MIN: CPT | Performed by: ORTHOPAEDIC SURGERY

## 2025-06-24 NOTE — PROGRESS NOTES
James R Lachman, M.D.  Attending, Orthopaedic Surgery  Foot and Ankle  [facility]      ORTHOPAEDIC FOOT AND ANKLE CLINIC VISIT     Assessment & Plan  Pain in left foot         Encounter for lower extremity comparison imaging study         Closed displaced fracture of proximal phalanx of lesser toe of left foot, initial encounter  The patient verbalized understanding of exam findings and treatment plan. We engaged in the shared decision-making process and treatment options were discussed at length with the patient. Surgical and conservative management discussed today along with risks and benefits.  Physical examination and images reviewed with the patient which are consistent with a 4th proximal phalanx fracture without dislocation of the PIP joint   Patient was recommended for wide toe box shoes and a post op shoe for support. She may discontinue the post op shoe when she feels comfortable   Continue weight bearing as tolerated   NSAIDs and Tylenol for pain control   Follow up PRN               History of Present Illness:   Chief Complaint:   Chief Complaint   Patient presents with    Left Foot - Pain     Broken foot. Happened the 18th. Happened last wed. Happened going up stairs she stubbed her toe.      Swetha Zuniga is a 60 y.o. female who is being seen for left foot pain in the 4th toe.  Pain is localized at fourth toe with minimal radiating and described as sharp and severe. Patient denies numbness, tingling or radicular pain.  Denies history of neuropathy.  Patient does not smoke, does not have diabetes and does not take blood thinners.  Patient denies family history of anesthesia complications and has not had any complications with anesthesia.     Pain/symptom timing:  Worse during the day when active  Pain/symptom context:  Worse with activites and work  Pain/symptom modifying factors:  Rest makes better, activities make worse  Pain/symptom associated signs/symptoms:  none    Prior treatment   NSAIDsYes   Injections No   Bracing/Orthotics No    Physical Therapy No     Orthopedic Surgical History:   Left Tulio's Neuroma excision     Past Medical, Surgical and Social History:  Past Medical History:  has a past medical history of Allergic rhinitis, Arthritis, Bloating (2020), COVID-19 (2020), Depression, Disease of thyroid gland, Heart palpitations, History of chickenpox, History of measles, History of mumps, HL (hearing loss), Hypothyroidism, Otitis media, Palpitations (2018), PONV (postoperative nausea and vomiting), Tinnitus, Wears contact lenses, and Wears glasses.  Problem List: does not have any pertinent problems on file.  Past Surgical History:  has a past surgical history that includes  section (, , ); Appendectomy (); Abdominal adhesion surgery; pr excision interdigital joya neuroma single each (Left, 2019); Other surgical history; Ectopic pregnancy surgery; pr exc b9 lesion mrgn xcp sk tg s/n/h/f/g > 4.0cm (N/A, 2020); pr repair complex f/c/c/m/n/ax/g/h/f 2.6-7.5 cm (N/A, 2020); Hysterectomy (); Augmentation mammaplasty (); and Colonoscopy ().  Family History: family history includes Anaphylaxis due to honey bee venom in her father; Emphysema in her paternal uncle; Hypertension in her father; Hyperthyroidism in her sister; Hypothyroidism in her sister; Injury due to car accident in her father; No Known Problems in her daughter, maternal aunt, maternal aunt, maternal grandmother, mother, paternal aunt, paternal grandmother, son, and son; Prostate cancer in her brother and another family member; Rheum arthritis in her sister; Stroke in her maternal grandfather.  Social History:  reports that she has never smoked. She has never used smokeless tobacco. She reports current alcohol use of about 1.0 - 2.0 standard drink of alcohol per week. She reports that she does not use drugs.  Current Medications:  "has a current medication list which includes the following prescription(s): ascorbic acid, azelastine, b complex vitamins, bupropion, calcium carbonate, levocetirizine, metronidazole, mometasone, NON FORMULARY, synthroid, bioflavonoid products, and NON FORMULARY.  Allergies: is allergic to other.     Review of Systems:  General- denies fever/chills  HEENT- denies hearing loss or sore throat  Eyes- denies eye pain or visual disturbances, denies red eyes  Respiratory- denies cough or SOB  Cardio- denies chest pain or palpitations  GI- denies abdominal pain  Endocrine- denies urinary frequency  Urinary- denies pain with urination  Musculoskeletal- Negative except noted above  Skin- denies rashes or wounds  Neurological- denies dizziness or headache  Psychiatric- denies anxiety or difficulty concentrating    Physical Exam:   Ht 5' 3\" (1.6 m)   Wt 55.8 kg (123 lb)   BMI 21.79 kg/m²   General/Constitutional: No apparent distress: well-nourished and well developed.  Eyes: normal ocular motion  Cardio: RRR, Normal S1S2, No m/r/g  Lymphatic: No appreciable lymphadenopathy  Respiratory: Non-labored breathing, CTA b/l no w/c/r  Vascular: No edema, swelling or tenderness, except as noted in detailed exam.  Integumentary: No impressive skin lesions present, except as noted in detailed exam.  Neuro: No ataxia or tremors noted  Psych: Normal mood and affect, oriented to person, place and time. Appropriate affect.  Musculoskeletal: Normal, except as noted in detailed exam and in HPI.    Examination    Left    Gait Antalgic   Musculoskeletal Tender to palpation at fourth toe     Skin Normal with generalized swelling of phalanx      Nails Normal    Range of Motion  30 degrees dorsiflexion, 20 degrees plantarflexion  Subtalar motion: normla     Stability Stable    Muscle Strength 5/5 tibialis anterior  5/5 gastrocnemius-soleus  5/5 posterior tibialis  5/5 peroneal/eversion strength  5/5 EHL  5/5 FHL    Neurologic Normal    Sensation " Intact to light touch throughout sural, saphenous, superficial peroneal, deep peroneal and medial/lateral plantar nerve distributions.  [place] 5.07 filament (10g) testing  deferred.    Cardiovascular Brisk capillary refill < 2 seconds,intact DP and PT pulses    Special Tests None      Imaging Studies:   3 views of the left foot were taken, reviewed and interpreted independently that demonstrate an extra-articular fracture of the distal 1/3 proximal phalanx. Reviewed by me personally.        James R. Lachman, MD  Foot & Ankle Surgery   Department of Orthopaedic Surgery  [facility]      I personally performed the service.    James R. Lachman, MD

## 2025-08-12 ENCOUNTER — TELEPHONE (OUTPATIENT)
Dept: PLASTIC SURGERY | Facility: CLINIC | Age: 60
End: 2025-08-12

## (undated) DEVICE — ELECTRODE NEEDLE MEGAFINE 2IN E-Z CLEAN MEGADYNE -0118

## (undated) DEVICE — ACE WRAP 4 IN UNSTERILE

## (undated) DEVICE — SUT VICRYL 4-0 PS-2 18 IN J496G

## (undated) DEVICE — SPONGE STICK WITH PVP-I: Brand: KENDALL

## (undated) DEVICE — LIGHT HANDLE COVER SLEEVE DISP BLUE STELLAR

## (undated) DEVICE — 3M™ STERI-STRIP™ COMPOUND BENZOIN TINCTURE 40 BAGS/CARTON 4 CARTONS/CASE C1544: Brand: 3M™ STERI-STRIP™

## (undated) DEVICE — SPONGE SCRUB 4 PCT CHLORHEXIDINE

## (undated) DEVICE — VIAL DECANTER

## (undated) DEVICE — BETHLEHEM UNIVERSAL  MIONR EXT: Brand: CARDINAL HEALTH

## (undated) DEVICE — POV-IOD SWAB STICKS

## (undated) DEVICE — ABDOMINAL PAD: Brand: DERMACEA

## (undated) DEVICE — INTENDED FOR TISSUE SEPARATION, AND OTHER PROCEDURES THAT REQUIRE A SHARP SURGICAL BLADE TO PUNCTURE OR CUT.: Brand: BARD-PARKER ® CARBON RIB-BACK BLADES

## (undated) DEVICE — GLOVE INDICATOR PI UNDERGLOVE SZ 6.5 BLUE

## (undated) DEVICE — OCCLUSIVE GAUZE STRIP,3% BISMUTH TRIBROMOPHENATE IN PETROLATUM BLEND: Brand: XEROFORM

## (undated) DEVICE — CURITY STRETCH BANDAGE: Brand: CURITY

## (undated) DEVICE — NEEDLE 27 G X 1 1/4

## (undated) DEVICE — 3M™ STERI-STRIP™ REINFORCED ADHESIVE SKIN CLOSURES, R1547, 1/2 IN X 4 IN (12 MM X 100 MM), 6 STRIPS/ENVELOPE: Brand: 3M™ STERI-STRIP™

## (undated) DEVICE — TIBURON SPLIT SHEET: Brand: CONVERTORS

## (undated) DEVICE — SUT ETHILON 3-0 PS-1 18 IN 1663G

## (undated) DEVICE — GLOVE SRG BIOGEL 6.5

## (undated) DEVICE — SUT SILK 5-0 P-3 18 IN 640G

## (undated) DEVICE — GLOVE SRG BIOGEL 8

## (undated) DEVICE — GAUZE SPONGES,USP TYPE VII GAUZE, 12 PLY: Brand: CURITY

## (undated) DEVICE — GLOVE INDICATOR PI UNDERGLOVE SZ 8 BLUE

## (undated) DEVICE — CHLORAPREP HI-LITE 26ML ORANGE

## (undated) DEVICE — PENCIL ELECTROSURG E-Z CLEAN -0035H

## (undated) DEVICE — TUBING SUCTION 5MM X 12 FT

## (undated) DEVICE — GLOVE SRG BIOGEL 7

## (undated) DEVICE — BETHLEHEM UNIVERSAL OUTPATIENT: Brand: CARDINAL HEALTH

## (undated) DEVICE — DRAPE SHEET THREE QUARTER